# Patient Record
Sex: FEMALE | Race: ASIAN | Employment: UNEMPLOYED | URBAN - METROPOLITAN AREA
[De-identification: names, ages, dates, MRNs, and addresses within clinical notes are randomized per-mention and may not be internally consistent; named-entity substitution may affect disease eponyms.]

---

## 2019-02-20 ENCOUNTER — HOSPITAL ENCOUNTER (OUTPATIENT)
Age: 54
Discharge: HOME | End: 2019-02-22
Payer: COMMERCIAL

## 2019-02-20 ENCOUNTER — NURSE ONLY (OUTPATIENT)
Dept: FAMILY MEDICINE CLINIC | Age: 54
End: 2019-02-20
Payer: COMMERCIAL

## 2019-02-20 DIAGNOSIS — R53.83 FATIGUE, UNSPECIFIED TYPE: ICD-10-CM

## 2019-02-20 DIAGNOSIS — R53.83 FATIGUE, UNSPECIFIED TYPE: Primary | ICD-10-CM

## 2019-02-20 LAB
ALBUMIN SERPL-MCNC: 3.9 G/DL (ref 3.5–5.2)
ALP BLD-CCNC: 98 U/L (ref 35–104)
ALT SERPL-CCNC: 20 U/L (ref 0–32)
ANION GAP SERPL CALCULATED.3IONS-SCNC: 19 MMOL/L (ref 7–16)
AST SERPL-CCNC: 95 U/L (ref 0–31)
BASOPHILS ABSOLUTE: 0.04 E9/L (ref 0–0.2)
BASOPHILS RELATIVE PERCENT: 0.5 % (ref 0–2)
BILIRUB SERPL-MCNC: 0.6 MG/DL (ref 0–1.2)
BUN BLDV-MCNC: 16 MG/DL (ref 6–20)
CALCIUM SERPL-MCNC: 8 MG/DL (ref 8.6–10.2)
CHLORIDE BLD-SCNC: 100 MMOL/L (ref 98–107)
CO2: 19 MMOL/L (ref 22–29)
CREAT SERPL-MCNC: 0.6 MG/DL (ref 0.5–1)
EOSINOPHILS ABSOLUTE: 0.04 E9/L (ref 0.05–0.5)
EOSINOPHILS RELATIVE PERCENT: 0.5 % (ref 0–6)
GFR AFRICAN AMERICAN: >60
GFR NON-AFRICAN AMERICAN: >60 ML/MIN/1.73
GLUCOSE BLD-MCNC: 97 MG/DL (ref 74–99)
HCT VFR BLD CALC: 30.9 % (ref 34–48)
HEMOGLOBIN: 10 G/DL (ref 11.5–15.5)
IMMATURE GRANULOCYTES #: 0.05 E9/L
IMMATURE GRANULOCYTES %: 0.6 % (ref 0–5)
LYMPHOCYTES ABSOLUTE: 0.87 E9/L (ref 1.5–4)
LYMPHOCYTES RELATIVE PERCENT: 11.2 % (ref 20–42)
MCH RBC QN AUTO: 42.6 PG (ref 26–35)
MCHC RBC AUTO-ENTMCNC: 32.4 % (ref 32–34.5)
MCV RBC AUTO: 131.5 FL (ref 80–99.9)
MONOCYTES ABSOLUTE: 1.24 E9/L (ref 0.1–0.95)
MONOCYTES RELATIVE PERCENT: 16 % (ref 2–12)
NEUTROPHILS ABSOLUTE: 5.51 E9/L (ref 1.8–7.3)
NEUTROPHILS RELATIVE PERCENT: 71.2 % (ref 43–80)
PDW BLD-RTO: 14 FL (ref 11.5–15)
PLATELET # BLD: 161 E9/L (ref 130–450)
PMV BLD AUTO: 10 FL (ref 7–12)
POIKILOCYTES: ABNORMAL
POLYCHROMASIA: ABNORMAL
POTASSIUM SERPL-SCNC: 2.8 MMOL/L (ref 3.5–5)
RBC # BLD: 2.35 E12/L (ref 3.5–5.5)
SODIUM BLD-SCNC: 138 MMOL/L (ref 132–146)
TEAR DROP CELLS: ABNORMAL
TOTAL PROTEIN: 8 G/DL (ref 6.4–8.3)
TSH SERPL DL<=0.05 MIU/L-ACNC: 3.2 UIU/ML (ref 0.27–4.2)
WBC # BLD: 7.8 E9/L (ref 4.5–11.5)

## 2019-02-20 PROCEDURE — 36415 COLL VENOUS BLD VENIPUNCTURE: CPT | Performed by: FAMILY MEDICINE

## 2019-02-20 PROCEDURE — 80053 COMPREHEN METABOLIC PANEL: CPT

## 2019-02-20 PROCEDURE — 85025 COMPLETE CBC W/AUTO DIFF WBC: CPT

## 2019-02-20 PROCEDURE — 84443 ASSAY THYROID STIM HORMONE: CPT

## 2019-02-21 DIAGNOSIS — R53.83 FATIGUE, UNSPECIFIED TYPE: ICD-10-CM

## 2019-02-21 DIAGNOSIS — R79.89 ABNORMAL COMPLETE BLOOD COUNT: Primary | ICD-10-CM

## 2019-02-21 DIAGNOSIS — R71.8 ELEVATED MCV: ICD-10-CM

## 2019-02-25 RX ORDER — SERTRALINE HYDROCHLORIDE 100 MG/1
150 TABLET, FILM COATED ORAL DAILY
Qty: 45 TABLET | Refills: 3 | Status: SHIPPED | OUTPATIENT
Start: 2019-02-25 | End: 2019-06-24 | Stop reason: SDUPTHER

## 2019-02-28 ENCOUNTER — HOSPITAL ENCOUNTER (OUTPATIENT)
Age: 54
Discharge: HOME | End: 2019-03-02
Payer: COMMERCIAL

## 2019-02-28 DIAGNOSIS — R53.83 FATIGUE, UNSPECIFIED TYPE: ICD-10-CM

## 2019-02-28 DIAGNOSIS — R71.8 ELEVATED MCV: ICD-10-CM

## 2019-02-28 DIAGNOSIS — R79.89 ABNORMAL COMPLETE BLOOD COUNT: ICD-10-CM

## 2019-02-28 LAB
FOLATE: 5.3 NG/ML (ref 4.8–24.2)
VITAMIN B-12: 551 PG/ML (ref 211–946)

## 2019-02-28 PROCEDURE — 82607 VITAMIN B-12: CPT

## 2019-02-28 PROCEDURE — 82746 ASSAY OF FOLIC ACID SERUM: CPT

## 2019-06-24 RX ORDER — SERTRALINE HYDROCHLORIDE 100 MG/1
TABLET, FILM COATED ORAL
Qty: 45 TABLET | Refills: 2 | OUTPATIENT
Start: 2019-06-24

## 2019-06-24 RX ORDER — SERTRALINE HYDROCHLORIDE 100 MG/1
150 TABLET, FILM COATED ORAL DAILY
Qty: 45 TABLET | Refills: 3 | OUTPATIENT
Start: 2019-06-24 | End: 2019-10-23 | Stop reason: SDUPTHER

## 2019-10-17 ENCOUNTER — HOSPITAL ENCOUNTER (OUTPATIENT)
Age: 54
Discharge: HOME | End: 2019-10-19
Payer: COMMERCIAL

## 2019-10-17 DIAGNOSIS — F10.20 ETOHISM (HCC): ICD-10-CM

## 2019-10-17 DIAGNOSIS — F10.20 ETOHISM (HCC): Primary | ICD-10-CM

## 2019-10-17 PROCEDURE — 82746 ASSAY OF FOLIC ACID SERUM: CPT

## 2019-10-17 PROCEDURE — 80053 COMPREHEN METABOLIC PANEL: CPT

## 2019-10-17 PROCEDURE — 84443 ASSAY THYROID STIM HORMONE: CPT

## 2019-10-17 PROCEDURE — 82607 VITAMIN B-12: CPT

## 2019-10-18 ENCOUNTER — HOSPITAL ENCOUNTER (OUTPATIENT)
Age: 54
Discharge: HOME | End: 2019-10-20
Payer: COMMERCIAL

## 2019-10-18 LAB
ALBUMIN SERPL-MCNC: 2.9 G/DL (ref 3.5–5.2)
ALP BLD-CCNC: 123 U/L (ref 35–104)
ALT SERPL-CCNC: 67 U/L (ref 0–32)
ANION GAP SERPL CALCULATED.3IONS-SCNC: 15 MMOL/L (ref 7–16)
ANISOCYTOSIS: ABNORMAL
AST SERPL-CCNC: 172 U/L (ref 0–31)
BASOPHILS ABSOLUTE: 0.01 E9/L (ref 0–0.2)
BASOPHILS RELATIVE PERCENT: 0.1 % (ref 0–2)
BILIRUB SERPL-MCNC: 1.5 MG/DL (ref 0–1.2)
BUN BLDV-MCNC: 26 MG/DL (ref 6–20)
BURR CELLS: ABNORMAL
CALCIUM SERPL-MCNC: 8.1 MG/DL (ref 8.6–10.2)
CHLORIDE BLD-SCNC: 104 MMOL/L (ref 98–107)
CO2: 15 MMOL/L (ref 22–29)
CREAT SERPL-MCNC: 1 MG/DL (ref 0.5–1)
EOSINOPHILS ABSOLUTE: 0.13 E9/L (ref 0.05–0.5)
EOSINOPHILS RELATIVE PERCENT: 0.8 % (ref 0–6)
FOLATE: 17.8 NG/ML (ref 4.8–24.2)
GFR AFRICAN AMERICAN: >60
GFR NON-AFRICAN AMERICAN: 58 ML/MIN/1.73
GLUCOSE BLD-MCNC: 113 MG/DL (ref 74–99)
HCT VFR BLD CALC: 16.4 % (ref 34–48)
HEMOGLOBIN: 4.1 G/DL (ref 11.5–15.5)
HYPOCHROMIA: ABNORMAL
IMMATURE GRANULOCYTES #: 0.1 E9/L
IMMATURE GRANULOCYTES %: 0.6 % (ref 0–5)
LYMPHOCYTES ABSOLUTE: 1.16 E9/L (ref 1.5–4)
LYMPHOCYTES RELATIVE PERCENT: 7.3 % (ref 20–42)
MCH RBC QN AUTO: 24.3 PG (ref 26–35)
MCHC RBC AUTO-ENTMCNC: 25 % (ref 32–34.5)
MCV RBC AUTO: 97 FL (ref 80–99.9)
MONOCYTES ABSOLUTE: 0.95 E9/L (ref 0.1–0.95)
MONOCYTES RELATIVE PERCENT: 6 % (ref 2–12)
NEUTROPHILS ABSOLUTE: 13.46 E9/L (ref 1.8–7.3)
NEUTROPHILS RELATIVE PERCENT: 85.2 % (ref 43–80)
OVALOCYTES: ABNORMAL
PDW BLD-RTO: 21.1 FL (ref 11.5–15)
PLATELET # BLD: 290 E9/L (ref 130–450)
PMV BLD AUTO: 10.9 FL (ref 7–12)
POIKILOCYTES: ABNORMAL
POLYCHROMASIA: ABNORMAL
POTASSIUM SERPL-SCNC: 5 MMOL/L (ref 3.5–5)
RBC # BLD: 1.69 E12/L (ref 3.5–5.5)
SODIUM BLD-SCNC: 134 MMOL/L (ref 132–146)
TARGET CELLS: ABNORMAL
TEAR DROP CELLS: ABNORMAL
TOTAL PROTEIN: 6.9 G/DL (ref 6.4–8.3)
TSH SERPL DL<=0.05 MIU/L-ACNC: 5.76 UIU/ML (ref 0.27–4.2)
VITAMIN B-12: 1449 PG/ML (ref 211–946)
WBC # BLD: 15.8 E9/L (ref 4.5–11.5)

## 2019-10-18 PROCEDURE — 85025 COMPLETE CBC W/AUTO DIFF WBC: CPT

## 2019-10-19 ENCOUNTER — APPOINTMENT (OUTPATIENT)
Dept: CT IMAGING | Age: 54
DRG: 811 | End: 2019-10-19
Attending: FAMILY MEDICINE
Payer: COMMERCIAL

## 2019-10-19 ENCOUNTER — HOSPITAL ENCOUNTER (INPATIENT)
Age: 54
LOS: 3 days | Discharge: HOME | DRG: 811 | End: 2019-10-22
Attending: FAMILY MEDICINE | Admitting: FAMILY MEDICINE
Payer: COMMERCIAL

## 2019-10-19 PROBLEM — D64.9 ANEMIA: Status: ACTIVE | Noted: 2019-10-19

## 2019-10-19 LAB
ABO/RH: NORMAL
ANTIBODY SCREEN: NORMAL
FERRITIN: 44 NG/ML
IRON SATURATION: 8 % (ref 15–50)
IRON: 21 MCG/DL (ref 37–145)
TOTAL IRON BINDING CAPACITY: 278 MCG/DL (ref 250–450)

## 2019-10-19 PROCEDURE — 6370000000 HC RX 637 (ALT 250 FOR IP): Performed by: FAMILY MEDICINE

## 2019-10-19 PROCEDURE — 2580000003 HC RX 258: Performed by: FAMILY MEDICINE

## 2019-10-19 PROCEDURE — 86900 BLOOD TYPING SEROLOGIC ABO: CPT

## 2019-10-19 PROCEDURE — P9016 RBC LEUKOCYTES REDUCED: HCPCS

## 2019-10-19 PROCEDURE — 86923 COMPATIBILITY TEST ELECTRIC: CPT

## 2019-10-19 PROCEDURE — 83550 IRON BINDING TEST: CPT

## 2019-10-19 PROCEDURE — 82728 ASSAY OF FERRITIN: CPT

## 2019-10-19 PROCEDURE — 36415 COLL VENOUS BLD VENIPUNCTURE: CPT

## 2019-10-19 PROCEDURE — 36430 TRANSFUSION BLD/BLD COMPNT: CPT

## 2019-10-19 PROCEDURE — 74177 CT ABD & PELVIS W/CONTRAST: CPT

## 2019-10-19 PROCEDURE — 6360000004 HC RX CONTRAST MEDICATION: Performed by: RADIOLOGY

## 2019-10-19 PROCEDURE — 83540 ASSAY OF IRON: CPT

## 2019-10-19 PROCEDURE — 86901 BLOOD TYPING SEROLOGIC RH(D): CPT

## 2019-10-19 PROCEDURE — 86850 RBC ANTIBODY SCREEN: CPT

## 2019-10-19 PROCEDURE — 1200000000 HC SEMI PRIVATE

## 2019-10-19 RX ORDER — NAPROXEN 250 MG/1
800 TABLET ORAL 2 TIMES DAILY PRN
Status: ON HOLD | COMMUNITY
End: 2019-10-22 | Stop reason: HOSPADM

## 2019-10-19 RX ORDER — SODIUM CHLORIDE 0.9 % (FLUSH) 0.9 %
10 SYRINGE (ML) INJECTION EVERY 12 HOURS SCHEDULED
Status: DISCONTINUED | OUTPATIENT
Start: 2019-10-19 | End: 2019-10-22 | Stop reason: HOSPADM

## 2019-10-19 RX ORDER — 0.9 % SODIUM CHLORIDE 0.9 %
250 INTRAVENOUS SOLUTION INTRAVENOUS ONCE
Status: COMPLETED | OUTPATIENT
Start: 2019-10-19 | End: 2019-10-19

## 2019-10-19 RX ORDER — ONDANSETRON 2 MG/ML
4 INJECTION INTRAMUSCULAR; INTRAVENOUS EVERY 6 HOURS PRN
Status: DISCONTINUED | OUTPATIENT
Start: 2019-10-19 | End: 2019-10-22 | Stop reason: HOSPADM

## 2019-10-19 RX ORDER — SODIUM CHLORIDE 0.9 % (FLUSH) 0.9 %
10 SYRINGE (ML) INJECTION PRN
Status: DISCONTINUED | OUTPATIENT
Start: 2019-10-19 | End: 2019-10-22 | Stop reason: HOSPADM

## 2019-10-19 RX ADMIN — SODIUM CHLORIDE 250 ML: 9 INJECTION, SOLUTION INTRAVENOUS at 15:47

## 2019-10-19 RX ADMIN — IOHEXOL 50 ML: 240 INJECTION, SOLUTION INTRATHECAL; INTRAVASCULAR; INTRAVENOUS; ORAL at 20:34

## 2019-10-19 RX ADMIN — SERTRALINE 150 MG: 100 TABLET, FILM COATED ORAL at 12:24

## 2019-10-19 RX ADMIN — Medication 10 ML: at 10:10

## 2019-10-19 RX ADMIN — Medication 10 ML: at 21:50

## 2019-10-19 RX ADMIN — IOPAMIDOL 110 ML: 755 INJECTION, SOLUTION INTRAVENOUS at 20:36

## 2019-10-19 ASSESSMENT — PAIN SCALES - GENERAL
PAINLEVEL_OUTOF10: 0
PAINLEVEL_OUTOF10: 0

## 2019-10-19 NOTE — CONSULTS
[address] [address]                                  CONSULTATION    PATIENT NAME: Roland Anders                     :        1965  MED REC NO:   [id number]                            ROOM:       [id number]  ACCOUNT NO:   [de-identified]                           ADMIT DATE: 10/19/2019  PROVIDER:     Nas Clements MD    CONSULT DATE:  10/19/2019    REASON FOR CONSULTATION:  Severe anemia, alcohol abuse. HISTORY OF PRESENT ILLNESS:  She is a 25-year-old woman. She was  admitted to the hospital with severe anemia. Her hemoglobin returned to  4.1 with a hematocrit of 16 and an MCV of 97. She has had a progressive  onset of fatigue. She acknowledges some bright red rectal bleeding, but  no melena. Stools are soft. She has no complaints of abdominal pain. In 2019, she had a hemoglobin of 10.0, hematocrit of 31, and an MCV  of 131.5, but surprisingly with it, she had a normal folate and B12  level. In December the year before, her hemoglobin was 8.7, her MCV was  121. Her platelet counts have been consistently normal.    In 2017, she had an AST of 128, an albumin of 3.6, and a bilirubin of  0.4. On 10/17, she had an albumin of 2.9, an alkaline phosphatase of  123, an ALT of 67, an AST of 172, and a bilirubin of 1.5. She has had  alcohol levels in the past, which were three to four times the upper  limit of legal intoxication, the last recorded in 2017. It is clear that she has a longstanding history of alcohol use and  abuse. When asked if she drinks, she said no. She has apparently been  abstinent for two, maybe three weeks. There is no history of alcohol  withdrawal, ascites, or jaundice. PAST MEDICAL HISTORY:  Very limited. She has a history of depression  and reports that she has been on Sertraline for years.   She has no  history of ulcer disease, diabetes, hypertension, heart disease, asthma,  kidney stones, gallstones. She has never been hospitalized. PAST SURGICAL HISTORY:  She has had no surgical procedures. SOCIAL HISTORY:  She met her  in [employer] and they worked  in [employer] for a long period of time moving back to [employer]  about a year and half ago. She is of  origins. She is currently  not working. She quit smoking cigarettes 30 years ago. OBJECTIVE:  GENERAL:  She is quite talkative. She is pale. She is not jaundiced. VITAL SIGNS:  She has a temperature of 97.9. A pulse, which is running  about 100 to 108. Respirations, which are easy and unlabored. Room air  saturations 99% to 100%. NECK:  She has no thyromegaly. She has no spider nevi. She has no neck  vein elevation or asterixis. LUNGS:  Lungs are clear. HEART:  Heart tones are normal.  ABDOMEN:  She has a rounded abdomen, but does not behave like she has  ascites. No hernia. EXTREMITIES:  Tattoo on the left ankle. No edema. ASSESSMENT:  Profoundly macrocytic previously. The normochromic  normocytic anemia now suggests a mixed pattern with development of iron  deficiency. Iron studies are pending. She is to be transfused. We  will get a CAT scan. I discussed endoscopic evaluation and would  anticipate proceeding tomorrow or on Monday if she is agreeable. I  think she ultimately will be, but expresses some reservations about  doing so. She should be beyond any risk of alcohol withdrawal at this  time.         Aidan Tim MD    D: 10/19/2019 13:46:49       T: 10/19/2019 13:58:22     [email or web address]  Job#: [id number]     Doc#: [id number]    CC:  Berny Perez MD

## 2019-10-19 NOTE — H&P
dysphagia,++ nausea,no vomiting ++ diarrhea. No abdominal pain no constipation. No melena.   No dysuria, no frequency, no urgency no hematuria. ENDO   No heat or cold intolerance. No diabetes, no thyroid problems. HEME  No bruising no bleeding problems. [address] SKEL. No joint pain, no joint swelling,++ weakness, no tenderness, no cramps, no  back pain, no neck pain   PSHYCH. No depression ++ anxiety. Not suicidal  NEURO. No headaches,++weakness no dysarthria. No paralysis,no paresthesias,  No vertigo, no seizures, no tumors. No memory loss. Decreased concentration. No Anhedonia, ++ sleep disorders. DATA:      Recent Labs     10/18/19  1533   WBC 15.8*   HGB 4.1*   HCT 16.4*   MCV 97.0        Recent Labs     10/17/19  1400      K 5.0      CO2 15*   BUN 26*   CREATININE 1.0   LABGLOM 58   CALCIUM 8.1*     Recent Labs     10/17/19  1400   ALT 67*     INR: No results for input(s): INR in the last 72 hours. Invalid input(s): PT/INR    No results found for: CRP  No results found for: SEDRATE  No results for input(s): POCGLU in the last 72 hours. Lipids: No results for input(s): CHOL, HDL in the last 72 hours. Invalid input(s): LDLCALCU  ABGs: No results found for: PHART, PO2ART, CFZ2UYX    Last 3 Troponin:    Lab Results   Component Value Date    TROPONINI <0.01 12/23/2017     TSH:    Lab Results   Component Value Date    TSH 5.760 10/17/2019        No results for input(s): POCGLU in the last 72 hours. U/A:   No results for input(s): NITRITE, COLORU, PHUR, LABCAST, WBCUA, RBCUA, MUCUS, TRICHOMONAS, YEAST, BACTERIA, CLARITYU, SPECGRAV, LEUKOCYTESUR, UROBILINOGEN, BILIRUBINUR, BLOODU, GLUCOSEU, AMORPHOUS in the last 72 hours.     Invalid input(s): [place] studies:No results found for: FERRITIN  Bone disease:No results found for: PTH, MG, PHOS  Nutrition:No results found for: ALB    Wt

## 2019-10-19 NOTE — PATIENT CARE CONFERENCE
[facility] Quality Flow/Interdisciplinary Rounds Progress Note        Quality Flow Rounds held on October 19, 2019    Disciplines Attending:  Bedside Nurse, ,  and Nursing Unit Leadership    Noe De Los Santos was admitted on 10/19/2019  9:13 AM    Anticipated Discharge Date:  Expected Discharge Date: 10/22/19    Disposition:    Jaleel Score:  Jaleel Scale Score: 19    Readmission Risk              Risk of Unplanned Readmission:        5           Discussed patient goal for the day, patient clinical progression, and barriers to discharge.   The following Goal(s) of the Day/Commitment(s) have been identified:  Blood transfusion, monitor labs      Angie Meyers  October 19, 2019

## 2019-10-20 ENCOUNTER — ANESTHESIA EVENT (OUTPATIENT)
Dept: ENDOSCOPY | Age: 54
DRG: 811 | End: 2019-10-20
Payer: COMMERCIAL

## 2019-10-20 ENCOUNTER — ANESTHESIA (OUTPATIENT)
Dept: ENDOSCOPY | Age: 54
DRG: 811 | End: 2019-10-20
Payer: COMMERCIAL

## 2019-10-20 LAB
BASOPHILS ABSOLUTE: 0.04 E9/L (ref 0–0.2)
BASOPHILS RELATIVE PERCENT: 0.3 % (ref 0–2)
BLOOD BANK DISPENSE STATUS: NORMAL
BLOOD BANK PRODUCT CODE: NORMAL
BPU ID: NORMAL
DESCRIPTION BLOOD BANK: NORMAL
EOSINOPHILS ABSOLUTE: 0.27 E9/L (ref 0.05–0.5)
EOSINOPHILS RELATIVE PERCENT: 1.8 % (ref 0–6)
HCT VFR BLD CALC: 22.7 % (ref 34–48)
HEMOGLOBIN: 6.8 G/DL (ref 11.5–15.5)
IMMATURE GRANULOCYTES #: 0.1 E9/L
IMMATURE GRANULOCYTES %: 0.7 % (ref 0–5)
LYMPHOCYTES ABSOLUTE: 1.57 E9/L (ref 1.5–4)
LYMPHOCYTES RELATIVE PERCENT: 10.5 % (ref 20–42)
MCH RBC QN AUTO: 27.2 PG (ref 26–35)
MCHC RBC AUTO-ENTMCNC: 30 % (ref 32–34.5)
MCV RBC AUTO: 90.8 FL (ref 80–99.9)
MONOCYTES ABSOLUTE: 1.07 E9/L (ref 0.1–0.95)
MONOCYTES RELATIVE PERCENT: 7.2 % (ref 2–12)
NEUTROPHILS ABSOLUTE: 11.9 E9/L (ref 1.8–7.3)
NEUTROPHILS RELATIVE PERCENT: 79.5 % (ref 43–80)
PDW BLD-RTO: 17.6 FL (ref 11.5–15)
PLATELET # BLD: 223 E9/L (ref 130–450)
PMV BLD AUTO: 10.2 FL (ref 7–12)
RBC # BLD: 2.5 E12/L (ref 3.5–5.5)
WBC # BLD: 15 E9/L (ref 4.5–11.5)

## 2019-10-20 PROCEDURE — 85025 COMPLETE CBC W/AUTO DIFF WBC: CPT

## 2019-10-20 PROCEDURE — 36415 COLL VENOUS BLD VENIPUNCTURE: CPT

## 2019-10-20 PROCEDURE — 2580000003 HC RX 258: Performed by: FAMILY MEDICINE

## 2019-10-20 PROCEDURE — 86803 HEPATITIS C AB TEST: CPT

## 2019-10-20 PROCEDURE — 1200000000 HC SEMI PRIVATE

## 2019-10-20 PROCEDURE — 6370000000 HC RX 637 (ALT 250 FOR IP): Performed by: FAMILY MEDICINE

## 2019-10-20 PROCEDURE — 86704 HEP B CORE ANTIBODY TOTAL: CPT

## 2019-10-20 PROCEDURE — 36430 TRANSFUSION BLD/BLD COMPNT: CPT

## 2019-10-20 RX ORDER — 0.9 % SODIUM CHLORIDE 0.9 %
250 INTRAVENOUS SOLUTION INTRAVENOUS ONCE
Status: COMPLETED | OUTPATIENT
Start: 2019-10-20 | End: 2019-10-20

## 2019-10-20 RX ADMIN — SERTRALINE 150 MG: 100 TABLET, FILM COATED ORAL at 08:27

## 2019-10-20 RX ADMIN — Medication 10 ML: at 21:00

## 2019-10-20 RX ADMIN — SODIUM CHLORIDE 250 ML: 9 INJECTION, SOLUTION INTRAVENOUS at 12:48

## 2019-10-20 RX ADMIN — Medication 10 ML: at 08:27

## 2019-10-20 ASSESSMENT — PAIN SCALES - GENERAL
PAINLEVEL_OUTOF10: 0
PAINLEVEL_OUTOF10: 0

## 2019-10-20 ASSESSMENT — ENCOUNTER SYMPTOMS: SHORTNESS OF BREATH: 0

## 2019-10-20 NOTE — PROGRESS NOTES
Tx ii units, Hgb to 6.8  Iron studies c/w iron deficency in this situation  CT: ascites, gallstones and calcificed fibroid. I believe liver is cirrhotic but radiology did not call  it or raise the possibility    Will ask IR if enough fluid for diagnostic tap which there should be easily done  Check INR  EGD tomorrow.

## 2019-10-20 NOTE — PATIENT CARE CONFERENCE
[facility] Quality Flow/Interdisciplinary Rounds Progress Note        Quality Flow Rounds held on October 20, 2019    Disciplines Attending:  Bedside Nurse, ,  and Nursing Unit Leadership    Nitesh Delaney was admitted on 10/19/2019  9:13 AM    Anticipated Discharge Date:  Expected Discharge Date: 10/22/19    Disposition:    Jaleel Score:  Jaleel Scale Score: 20    Readmission Risk              Risk of Unplanned Readmission:        6           Discussed patient goal for the day, patient clinical progression, and barriers to discharge.   The following Goal(s) of the Day/Commitment(s) have been identified:  Monitor labs, safety control, discharge planning      Jin Nguyen  October 20, 2019

## 2019-10-21 VITALS
OXYGEN SATURATION: 100 % | DIASTOLIC BLOOD PRESSURE: 77 MMHG | SYSTOLIC BLOOD PRESSURE: 130 MMHG | RESPIRATION RATE: 16 BRPM

## 2019-10-21 LAB
BASOPHILS ABSOLUTE: 0.07 E9/L (ref 0–0.2)
BASOPHILS RELATIVE PERCENT: 0.5 % (ref 0–2)
EOSINOPHILS ABSOLUTE: 0.41 E9/L (ref 0.05–0.5)
EOSINOPHILS RELATIVE PERCENT: 3 % (ref 0–6)
HCT VFR BLD CALC: 30.1 % (ref 34–48)
HEMOGLOBIN: 9.5 G/DL (ref 11.5–15.5)
HEPATITIS C ANTIBODY INTERPRETATION: NORMAL
IMMATURE GRANULOCYTES #: 0.11 E9/L
IMMATURE GRANULOCYTES %: 0.8 % (ref 0–5)
INR BLD: 1.3
LV EF: 60 %
LVEF MODALITY: NORMAL
LYMPHOCYTES ABSOLUTE: 1.34 E9/L (ref 1.5–4)
LYMPHOCYTES RELATIVE PERCENT: 9.9 % (ref 20–42)
MCH RBC QN AUTO: 27.2 PG (ref 26–35)
MCHC RBC AUTO-ENTMCNC: 31.6 % (ref 32–34.5)
MCV RBC AUTO: 86.2 FL (ref 80–99.9)
MONOCYTES ABSOLUTE: 0.78 E9/L (ref 0.1–0.95)
MONOCYTES RELATIVE PERCENT: 5.8 % (ref 2–12)
NEUTROPHILS ABSOLUTE: 10.8 E9/L (ref 1.8–7.3)
NEUTROPHILS RELATIVE PERCENT: 80 % (ref 43–80)
PDW BLD-RTO: 17.9 FL (ref 11.5–15)
PLATELET # BLD: 190 E9/L (ref 130–450)
PMV BLD AUTO: 10.1 FL (ref 7–12)
PROTHROMBIN TIME: 15.7 SEC (ref 9.3–12.4)
RBC # BLD: 3.49 E12/L (ref 3.5–5.5)
WBC # BLD: 13.5 E9/L (ref 4.5–11.5)

## 2019-10-21 PROCEDURE — 93306 TTE W/DOPPLER COMPLETE: CPT

## 2019-10-21 PROCEDURE — 3609012400 HC EGD TRANSORAL BIOPSY SINGLE/MULTIPLE: Performed by: INTERNAL MEDICINE

## 2019-10-21 PROCEDURE — 2580000003 HC RX 258: Performed by: NURSE ANESTHETIST, CERTIFIED REGISTERED

## 2019-10-21 PROCEDURE — 0DB68ZX EXCISION OF STOMACH, VIA NATURAL OR ARTIFICIAL OPENING ENDOSCOPIC, DIAGNOSTIC: ICD-10-PCS | Performed by: INTERNAL MEDICINE

## 2019-10-21 PROCEDURE — 1200000000 HC SEMI PRIVATE

## 2019-10-21 PROCEDURE — 3700000001 HC ADD 15 MINUTES (ANESTHESIA): Performed by: INTERNAL MEDICINE

## 2019-10-21 PROCEDURE — 36415 COLL VENOUS BLD VENIPUNCTURE: CPT

## 2019-10-21 PROCEDURE — 88342 IMHCHEM/IMCYTCHM 1ST ANTB: CPT

## 2019-10-21 PROCEDURE — 6360000002 HC RX W HCPCS: Performed by: NURSE ANESTHETIST, CERTIFIED REGISTERED

## 2019-10-21 PROCEDURE — 85610 PROTHROMBIN TIME: CPT

## 2019-10-21 PROCEDURE — 7100000010 HC PHASE II RECOVERY - FIRST 15 MIN: Performed by: INTERNAL MEDICINE

## 2019-10-21 PROCEDURE — 3700000000 HC ANESTHESIA ATTENDED CARE: Performed by: INTERNAL MEDICINE

## 2019-10-21 PROCEDURE — 88341 IMHCHEM/IMCYTCHM EA ADD ANTB: CPT

## 2019-10-21 PROCEDURE — 2709999900 HC NON-CHARGEABLE SUPPLY: Performed by: INTERNAL MEDICINE

## 2019-10-21 PROCEDURE — 7100000011 HC PHASE II RECOVERY - ADDTL 15 MIN: Performed by: INTERNAL MEDICINE

## 2019-10-21 PROCEDURE — 85025 COMPLETE CBC W/AUTO DIFF WBC: CPT

## 2019-10-21 PROCEDURE — 2580000003 HC RX 258: Performed by: FAMILY MEDICINE

## 2019-10-21 PROCEDURE — 6370000000 HC RX 637 (ALT 250 FOR IP): Performed by: FAMILY MEDICINE

## 2019-10-21 PROCEDURE — 88305 TISSUE EXAM BY PATHOLOGIST: CPT

## 2019-10-21 RX ORDER — PROPOFOL 10 MG/ML
INJECTION, EMULSION INTRAVENOUS PRN
Status: DISCONTINUED | OUTPATIENT
Start: 2019-10-21 | End: 2019-10-21 | Stop reason: SDUPTHER

## 2019-10-21 RX ORDER — SODIUM CHLORIDE 9 MG/ML
INJECTION, SOLUTION INTRAVENOUS CONTINUOUS PRN
Status: DISCONTINUED | OUTPATIENT
Start: 2019-10-21 | End: 2019-10-21 | Stop reason: SDUPTHER

## 2019-10-21 RX ADMIN — PROPOFOL 200 MG: 10 INJECTION, EMULSION INTRAVENOUS at 14:05

## 2019-10-21 RX ADMIN — Medication 10 ML: at 10:09

## 2019-10-21 RX ADMIN — Medication 10 ML: at 22:00

## 2019-10-21 RX ADMIN — SERTRALINE 150 MG: 100 TABLET, FILM COATED ORAL at 17:18

## 2019-10-21 RX ADMIN — SODIUM CHLORIDE: 9 INJECTION, SOLUTION INTRAVENOUS at 13:44

## 2019-10-21 ASSESSMENT — PAIN - FUNCTIONAL ASSESSMENT: PAIN_FUNCTIONAL_ASSESSMENT: ACTIVITIES ARE NOT PREVENTED

## 2019-10-21 ASSESSMENT — PAIN SCALES - GENERAL
PAINLEVEL_OUTOF10: 0

## 2019-10-21 ASSESSMENT — PAIN DESCRIPTION - FREQUENCY: FREQUENCY: INTERMITTENT

## 2019-10-21 ASSESSMENT — PAIN DESCRIPTION - ONSET: ONSET: ON-GOING

## 2019-10-21 ASSESSMENT — PAIN DESCRIPTION - PROGRESSION: CLINICAL_PROGRESSION: RAPIDLY IMPROVING

## 2019-10-21 NOTE — BRIEF OP NOTE
Brief Postoperative Note  ______________________________________________________________    Patient: Delford Fleischer  YOB: 1965  MRN: [id number]  Date of Procedure: 10/21/2019    Pre-Op Diagnosis: anemia, cirrhosis    Post-Op Diagnosis: benign antral ulcer + duodenal ulcer       Procedure(s):  EGD ESOPHAGOGASTRODUODENOSCOPY plus bx    Anesthesia: Monitor Anesthesia Care    Surgeon(s):  Pennie Hong MD    Assistant:     Estimated Blood Loss (mL): 0    Complications: None    Specimens:   ID Type Source Tests Collected by Time Destination   A : gastric ulcer bx Tissue Stomach SURGICAL PATHOLOGY Pennie Hong MD 10/21/2019 1408        Implants:  * No implants in log *      Drains: * No LDAs found *    Findings: see Jason Godwin MD  Date: 10/21/2019  Time: 2:12 PM

## 2019-10-21 NOTE — PROGRESS NOTES
Admit Date: 10/19/2019       Subjective:    hgb 6.8 after 2 u prbc's  Ct showed ascites but does not note any liver abnormalities  She looks better, still decreased appetite and food not tasting good. Objective:    Scheduled Meds:  Current Facility-Administered Medications   Medication Dose Route Frequency Provider Last Rate Last Dose    sertraline (ZOLOFT) tablet 150 mg  150 mg Oral Daily Parag Lomeli MD   150 mg at 10/20/19 0827    sodium chloride flush 0.9 % injection 10 mL  10 mL Intravenous 2 times per day Parag Lomeli MD   10 mL at 10/20/19 2100    sodium chloride flush 0.9 % injection 10 mL  10 mL Intravenous PRN Parag Lomeli MD   10 mL at 10/19/19 1010    magnesium hydroxide (MILK OF MAGNESIA) 400 MG/5ML suspension 30 mL  30 mL Oral Daily PRN Parag Lomeli MD        ondansetron [facility]) injection 4 mg  4 mg Intravenous Q6H PRN Parag Lomeli MD        sodium chloride flush 0.9 % injection 10 mL  10 mL Intravenous PRN Maria Luz Jenkins II, MD           Continuous Infusions  :    PRN Meds:  sodium chloride flush, magnesium hydroxide, ondansetron, sodium chloride flush      Wt Readings from Last 3 Encounters:   10/20/19 100 lb (45.4 kg)   12/23/17 100 lb (45.4 kg)     I/O last 3 completed shifts: In: 700 [Blood:700]  Out: -     Intake/Output Summary (Last 24 hours) at 10/21/2019 0735  Last data filed at 10/20/2019 2100  Gross per 24 hour   Intake 700 ml   Output --   Net 700 ml       Vitals:    10/20/19 2100   BP: 129/67   Pulse: 75   Resp: 16   Temp: 98.9 °F (37.2 °C)   SpO2: 99%       Physical Exam:    Skin:    Warm and dry.   No rash or bruises  HEENT:   PERRLA, EOMI  Neck:    No JVD, No thyromegaly, No carotid bruit  Cardiac:   RRR,++ murmur  Lungs:   CTA,  No wheezes or rales,Normal percussion  Abdomen:  Normal bowel sounds, abd soft, non-tender, no rebound or guarding  Extremities:   No clubbing, edema or cyanosis  Neurological:   A & O x 3, Moves all extremities,  sions noted\"}                           CBC  Recent Labs     10/18/19  1533 10/20/19  0240 10/21/19  0400   WBC 15.8* 15.0* 13.5*   HGB 4.1* 6.8* 9.5*   HCT 16.4* 22.7* 30.1*   MCV 97.0 90.8 86.2    223 190     BMP  No results for input(s): NA, K, CL, CO2, BUN, CREATININE, LABGLOM, CALCIUM in the last 72 hours. Invalid input(s): GLU  LFT's  No results for input(s): ALT in the last 72 hours. Invalid input(s):  AST,  ALKPHOS,  BILITOT,  BILIDIR  INR:   Recent Labs     10/21/19  0400   INR 1.3         No results found for: CRP  No results found for: SEDRATE  No results for input(s): POCGLU in the last 72 hours. Lipids: No results for input(s): CHOL, HDL in the last 72 hours. Invalid input(s): LDLCALCU  ABGs: No results found for: PHART, PO2ART, QOV6GNN  SpO2 Readings from Last 1 Encounters:   10/20/19 99%       Last 3 Troponin:    Lab Results   Component Value Date    TROPONINI <0.01 12/23/2017     Lab Results   Component Value Date    TROPONINI <0.01 12/23/2017        TSH:    Lab Results   Component Value Date    TSH 5.760 10/17/2019             U/A:     Lab Results   Component Value Date    COLORU Yellow 12/23/2017    PHUR 7.0 12/23/2017    WBCUA NONE 12/23/2017    RBCUA NONE 12/23/2017    BACTERIA MANY 12/23/2017    CLARITYU Clear 12/23/2017    SPECGRAV 1.015 12/23/2017    LEUKOCYTESUR Negative 12/23/2017    UROBILINOGEN 0.2 12/23/2017    BILIRUBINUR Negative 12/23/2017    BLOODU Negative 12/23/2017    GLUCOSEU Negative 12/23/2017          Iron studies:  Lab Results   Component Value Date    FERRITIN 44 10/19/2019     Bone disease:No results found for: PTH, MG, PHOS  Nutrition:No results found for: ALB           Assessment:  Patient Active Problem List   Diagnosis Code    Anemia D64.9     1. Anemia              Combo blood loss, malnutritin and Fe deficiency     2. Severe malnutrition     3.  etoh abuse     4.  etoh liver disease     5. Ascites    6. Heart murmur new           Plan:     For scope tomorrow  Bety Phipps M.D.    10/21/2019

## 2019-10-21 NOTE — PATIENT CARE CONFERENCE
[facility] Quality Flow/Interdisciplinary Rounds Progress Note        Quality Flow Rounds held on October 21, 2019    Disciplines Attending:  Bedside Nurse, ,  and Nursing Unit Leadership    Elena Diego was admitted on 10/19/2019  9:13 AM    Anticipated Discharge Date:  Expected Discharge Date: 10/22/19    Disposition:    Jaleel Score:  Jaleel Scale Score: 21    Readmission Risk              Risk of Unplanned Readmission:        5           Discussed patient goal for the day, patient clinical progression, and barriers to discharge. The following Goal(s) of the Day/Commitment(s) have been identified:  Patient due to Endo today. Comfort.       Tracy Wilson  October 21, 2019

## 2019-10-22 ENCOUNTER — APPOINTMENT (OUTPATIENT)
Dept: ULTRASOUND IMAGING | Age: 54
DRG: 811 | End: 2019-10-22
Attending: FAMILY MEDICINE
Payer: COMMERCIAL

## 2019-10-22 VITALS
RESPIRATION RATE: 18 BRPM | HEART RATE: 89 BPM | WEIGHT: 100 LBS | HEIGHT: 60 IN | SYSTOLIC BLOOD PRESSURE: 117 MMHG | DIASTOLIC BLOOD PRESSURE: 68 MMHG | TEMPERATURE: 97.8 F | OXYGEN SATURATION: 100 % | BODY MASS INDEX: 19.63 KG/M2

## 2019-10-22 PROBLEM — D50.0 ANEMIA DUE TO GI BLOOD LOSS: Status: ACTIVE | Noted: 2019-10-22

## 2019-10-22 PROBLEM — D50.0 IRON DEFICIENCY ANEMIA DUE TO CHRONIC BLOOD LOSS: Status: ACTIVE | Noted: 2019-10-22

## 2019-10-22 LAB
APTT: 40.6 SEC (ref 24.5–35.1)
HEPATITIS B CORE TOTAL ANTIBODY: NONREACTIVE
INR BLD: 1.2
PROTHROMBIN TIME: 14.3 SEC (ref 9.3–12.4)

## 2019-10-22 PROCEDURE — 6370000000 HC RX 637 (ALT 250 FOR IP): Performed by: FAMILY MEDICINE

## 2019-10-22 PROCEDURE — 76700 US EXAM ABDOM COMPLETE: CPT

## 2019-10-22 PROCEDURE — 6370000000 HC RX 637 (ALT 250 FOR IP): Performed by: INTERNAL MEDICINE

## 2019-10-22 PROCEDURE — 85610 PROTHROMBIN TIME: CPT

## 2019-10-22 PROCEDURE — 36415 COLL VENOUS BLD VENIPUNCTURE: CPT

## 2019-10-22 PROCEDURE — 85730 THROMBOPLASTIN TIME PARTIAL: CPT

## 2019-10-22 PROCEDURE — 2580000003 HC RX 258: Performed by: FAMILY MEDICINE

## 2019-10-22 RX ORDER — FERROUS SULFATE 325(65) MG
325 TABLET ORAL
Qty: 30 TABLET | Refills: 3 | COMMUNITY
Start: 2019-10-22 | End: 2021-03-08 | Stop reason: SDUPTHER

## 2019-10-22 RX ORDER — PANTOPRAZOLE SODIUM 40 MG/1
40 TABLET, DELAYED RELEASE ORAL
Status: DISCONTINUED | OUTPATIENT
Start: 2019-10-22 | End: 2019-10-22 | Stop reason: HOSPADM

## 2019-10-22 RX ORDER — FUROSEMIDE 20 MG/1
20 TABLET ORAL DAILY
Qty: 30 TABLET | Refills: 5 | Status: SHIPPED | OUTPATIENT
Start: 2019-10-22 | End: 2020-07-11 | Stop reason: SDUPTHER

## 2019-10-22 RX ORDER — OMEPRAZOLE 40 MG/1
40 CAPSULE, DELAYED RELEASE ORAL
Qty: 30 CAPSULE | Refills: 5 | Status: SHIPPED | OUTPATIENT
Start: 2019-10-22 | End: 2020-07-11 | Stop reason: SDUPTHER

## 2019-10-22 RX ORDER — SPIRONOLACTONE 25 MG/1
25 TABLET ORAL DAILY
Qty: 30 TABLET | Refills: 5 | Status: SHIPPED | OUTPATIENT
Start: 2019-10-22 | End: 2021-01-07 | Stop reason: SDUPTHER

## 2019-10-22 RX ADMIN — Medication 10 ML: at 07:50

## 2019-10-22 RX ADMIN — PANTOPRAZOLE SODIUM 40 MG: 40 TABLET, DELAYED RELEASE ORAL at 11:55

## 2019-10-22 RX ADMIN — SERTRALINE 150 MG: 100 TABLET, FILM COATED ORAL at 07:50

## 2019-10-22 ASSESSMENT — PAIN SCALES - GENERAL: PAINLEVEL_OUTOF10: 0

## 2019-10-22 NOTE — PLAN OF CARE
Problem: Pain:  Goal: Pain level will decrease  Description  Pain level will decrease  Outcome: Met This Shift  Goal: Control of acute pain  Description  Control of acute pain  Outcome: Met This Shift  Goal: Control of chronic pain  Description  Control of chronic pain  Outcome: Met This Shift     Problem: Falls - Risk of:  Goal: Will remain free from falls  Description  Will remain free from falls  Outcome: Met This Shift  Goal: Absence of physical injury  Description  Absence of physical injury  Outcome: Met This Shift     Problem: Activity:  Goal: Fatigue will decrease  Description  Fatigue will decrease  Outcome: Met This Shift  Goal: Ability to tolerate increased activity will improve  Description  Ability to tolerate increased activity will improve  Outcome: Met This Shift  Goal: Ability to maintain optimal joint mobility will improve  Description  Ability to maintain optimal joint mobility will improve  Outcome: Met This Shift     Problem:  Bowel/Gastric:  Goal: Ability to achieve a regular elimination pattern will improve  Description  Ability to achieve a regular elimination pattern will improve  Outcome: Met This Shift     Problem: Cardiac:  Goal: Ability to maintain an adequate cardiac output will improve  Description  Ability to maintain an adequate cardiac output will improve  Outcome: Met This Shift  Goal: Ability to maintain adequate ventilation will improve  Description  Ability to maintain adequate ventilation will improve  Outcome: Met This Shift  Goal: Ability to achieve and maintain adequate cardiopulmonary perfusion will improve  Description  Ability to achieve and maintain adequate cardiopulmonary perfusion will improve  Outcome: Met This Shift     Problem: Health Behavior:  Goal: Identification of resources available to assist in meeting health care needs will improve  Description  Identification of resources available to assist in meeting health care needs will improve  Outcome: Met This Shift     Problem: Nutritional:  Goal: Maintenance of adequate nutrition will improve  Description  Maintenance of adequate nutrition will improve  Outcome: Met This Shift     Problem: Safety:  Goal: Ability to remain free from injury will improve  Description  Ability to remain free from injury will improve  Outcome: Met This Shift     Problem: Tissue Perfusion:  Goal: Ability to maintain adequate tissue perfusion will improve  Description  Ability to maintain adequate tissue perfusion will improve  Outcome: Met This Shift  Goal: Ability to maintain a stable neurologic state will improve  Description  Ability to maintain a stable neurologic state will improve  Outcome: Met This Shift

## 2019-10-22 NOTE — PROGRESS NOTES
Admit Date: 10/19/2019       Subjective:    hgb 9.5 after 4 u prbc's  Ct showed ascites but does not note any liver abnormalities    For  IR paracentesis  echo heart normal    Place on lasix, aldactone and omeprazole on discharge as well as supplemental iron       Objective:    Scheduled Meds:  Current Facility-Administered Medications   Medication Dose Route Frequency Provider Last Rate Last Dose    perflutren lipid microspheres (DEFINITY) injection 1.65 mg  1.5 mL Intravenous ONCE PRN Renita Meléndez MD        sertraline (ZOLOFT) tablet 150 mg  150 mg Oral Daily Renita Meléndez MD   150 mg at 10/21/19 1718    sodium chloride flush 0.9 % injection 10 mL  10 mL Intravenous 2 times per day Renita Meléndez MD   10 mL at 10/21/19 2200    sodium chloride flush 0.9 % injection 10 mL  10 mL Intravenous PRN Renita Meléndez MD   10 mL at 10/19/19 1010    magnesium hydroxide (MILK OF MAGNESIA) 400 MG/5ML suspension 30 mL  30 mL Oral Daily PRN Renita Meléndez MD        ondansetron [facility]) injection 4 mg  4 mg Intravenous Q6H PRN Renita Meléndez MD        sodium chloride flush 0.9 % injection 10 mL  10 mL Intravenous PRN Tr Newberry II, MD           Continuous Infusions  :    PRN Meds:  perflutren lipid microspheres, sodium chloride flush, magnesium hydroxide, ondansetron, sodium chloride flush      Wt Readings from Last 3 Encounters:   10/20/19 100 lb (45.4 kg)   12/23/17 100 lb (45.4 kg)     I/O last 3 completed shifts: In: 0 [P.O.:400; I.V.:300]  Out: -     Intake/Output Summary (Last 24 hours) at 10/22/2019 0740  Last data filed at 10/21/2019 2200  Gross per 24 hour   Intake 700 ml   Output --   Net 700 ml       Vitals:    10/21/19 2100   BP: 122/74   Pulse: 98   Resp: 16   Temp: 97.8 °F (36.6 °C)   SpO2: 97%       Physical Exam:    Skin:    Warm and dry.   No rash or bruises  HEENT:   PERRLA, EOMI  Neck:    No JVD, No thyromegaly, No carotid bruit  Cardiac:   RRR,++ murmur  Lungs:   CTA,  No malnutritin and Fe deficiency   Acute blood loss anemia  Chronic blood loss anemia         Risk Factors: alcohol abuse, cirrhosis       Treatment:  Transfused with 4 units PRBC's, GI consult, EGD  2. Severe malnutrition     3.  etoh abuse     4.  etoh liver disease     5. Ascites    6.   Heart murmur new    7.  cirrhosis       Plan:    For OR paracentesis    Earnest Najera M.D.    10/22/2019

## 2019-10-22 NOTE — CARE COORDINATION
Met with patient.  Discussed diagnosis and discharge plan of care   Pt lives with spouse in [place] and plans to return  Does not feel she needs anything upon discharge   Will follow   elda

## 2019-10-23 RX ORDER — SERTRALINE HYDROCHLORIDE 100 MG/1
150 TABLET, FILM COATED ORAL DAILY
Qty: 45 TABLET | Refills: 5 | Status: SHIPPED
Start: 2019-10-23 | End: 2020-05-04 | Stop reason: SDUPTHER

## 2019-10-23 NOTE — DISCHARGE SUMMARY
Physician Discharge Summary     Patient ID:  Geneva Amado  [id number]  89 y.o.  1965    Admit date: 10/19/2019    Discharge date and time: 10/22/2019  4:36 PM     Admitting Physician: Grupo Patel MD     Discharge Physician: Grupo Patel    Consults: GI    Admission Diagnoses:   Anemia [D64.9]    Discharge Diagnoses:     Patient Active Problem List   Diagnosis    Anemia    Iron deficiency anemia due to chronic blood loss    Anemia due to GI blood loss       Hospital Course: pt admitted with hgb 4.1 . Transfused to ghb 9.5 4 u prbc's. Had scope upper GI showed gastric and duodenal ulcer. CT abd + ascites but no note of cirrhosis although thought to be the case .  Not enough for diagnostic tap    Discharged home on lasix, aldactone, omeprazole and FE    Discharged Condition: good    Significant Diagnostic Studies: labs: , microbiology: , radiology: CT scan: , cardiac graphics: ECG:  and Echocardiogram:  and endoscopy: gastroscopy:     Treatments: procedures: scope  and blood transfusion 4 u prbc's    Disposition: home    Patient Instructions:     Discharge Medication List as of 10/22/2019  3:15 PM      START taking these medications    Details   omeprazole (PRILOSEC) 40 MG delayed release capsule Take 1 capsule by mouth every morning (before breakfast), Disp-30 capsule, R-5Normal      spironolactone (ALDACTONE) 25 MG tablet Take 1 tablet by mouth daily, Disp-30 tablet, R-5Normal      furosemide (LASIX) 20 MG tablet Take 1 tablet by mouth daily, Disp-30 tablet, R-5Normal      ferrous sulfate (CHESTER-AMANDA) 325 (65 Fe) MG tablet Take 1 tablet by mouth daily (with breakfast), Disp-30 tablet, R-3OTC         CONTINUE these medications which have NOT CHANGED    Details   sertraline (ZOLOFT) 100 MG tablet Take 1.5 tablets by mouth daily, Disp-45 tablet, R-3Phone In         STOP taking these medications       naproxen (NAPROSYN) 250 MG tablet Comments:   Reason for Stopping:             Activity: activity as

## 2019-10-24 ENCOUNTER — TELEPHONE (OUTPATIENT)
Dept: FAMILY MEDICINE CLINIC | Age: 54
End: 2019-10-24

## 2019-11-07 ENCOUNTER — HOSPITAL ENCOUNTER (OUTPATIENT)
Age: 54
Discharge: HOME | End: 2019-11-09
Payer: COMMERCIAL

## 2019-11-07 DIAGNOSIS — D64.9 ANEMIA, UNSPECIFIED TYPE: ICD-10-CM

## 2019-11-07 DIAGNOSIS — R18.8 OTHER ASCITES: ICD-10-CM

## 2019-11-07 DIAGNOSIS — R18.8 OTHER ASCITES: Primary | ICD-10-CM

## 2019-11-07 LAB
ALBUMIN SERPL-MCNC: 2.7 G/DL (ref 3.5–5.2)
ALP BLD-CCNC: 148 U/L (ref 35–104)
ALT SERPL-CCNC: 63 U/L (ref 0–32)
ANION GAP SERPL CALCULATED.3IONS-SCNC: 13 MMOL/L (ref 7–16)
ANISOCYTOSIS: ABNORMAL
AST SERPL-CCNC: 129 U/L (ref 0–31)
BASOPHILS ABSOLUTE: 0 E9/L (ref 0–0.2)
BASOPHILS RELATIVE PERCENT: 0.4 % (ref 0–2)
BILIRUB SERPL-MCNC: 1.7 MG/DL (ref 0–1.2)
BUN BLDV-MCNC: 12 MG/DL (ref 6–20)
CALCIUM SERPL-MCNC: 8.1 MG/DL (ref 8.6–10.2)
CHLORIDE BLD-SCNC: 102 MMOL/L (ref 98–107)
CO2: 19 MMOL/L (ref 22–29)
CREAT SERPL-MCNC: 0.7 MG/DL (ref 0.5–1)
EOSINOPHILS ABSOLUTE: 0.12 E9/L (ref 0.05–0.5)
EOSINOPHILS RELATIVE PERCENT: 0.9 % (ref 0–6)
GFR AFRICAN AMERICAN: >60
GFR NON-AFRICAN AMERICAN: >60 ML/MIN/1.73
GLUCOSE BLD-MCNC: 96 MG/DL (ref 74–99)
HCT VFR BLD CALC: 30.6 % (ref 34–48)
HEMOGLOBIN: 9 G/DL (ref 11.5–15.5)
LYMPHOCYTES ABSOLUTE: 0.28 E9/L (ref 1.5–4)
LYMPHOCYTES RELATIVE PERCENT: 1.7 % (ref 20–42)
MCH RBC QN AUTO: 28.2 PG (ref 26–35)
MCHC RBC AUTO-ENTMCNC: 29.4 % (ref 32–34.5)
MCV RBC AUTO: 95.9 FL (ref 80–99.9)
MONOCYTES ABSOLUTE: 0.41 E9/L (ref 0.1–0.95)
MONOCYTES RELATIVE PERCENT: 2.6 % (ref 2–12)
NEUTROPHILS ABSOLUTE: 13.11 E9/L (ref 1.8–7.3)
NEUTROPHILS RELATIVE PERCENT: 94.8 % (ref 43–80)
PDW BLD-RTO: 25.2 FL (ref 11.5–15)
PLATELET # BLD: 224 E9/L (ref 130–450)
PMV BLD AUTO: 10.9 FL (ref 7–12)
POTASSIUM SERPL-SCNC: 3.8 MMOL/L (ref 3.5–5)
RBC # BLD: 3.19 E12/L (ref 3.5–5.5)
SODIUM BLD-SCNC: 134 MMOL/L (ref 132–146)
TOTAL PROTEIN: 7.4 G/DL (ref 6.4–8.3)
WBC # BLD: 13.8 E9/L (ref 4.5–11.5)

## 2019-11-07 PROCEDURE — 80053 COMPREHEN METABOLIC PANEL: CPT

## 2019-11-07 PROCEDURE — 81596 NFCT DS CHRNC HCV 6 ASSAYS: CPT

## 2019-11-07 PROCEDURE — 85025 COMPLETE CBC W/AUTO DIFF WBC: CPT

## 2019-11-11 DIAGNOSIS — R53.81 DEBILITATED: ICD-10-CM

## 2019-11-11 DIAGNOSIS — R18.8 OTHER ASCITES: Primary | ICD-10-CM

## 2019-11-14 LAB
Lab: NORMAL
REPORT: NORMAL
THIS TEST SENT TO: NORMAL

## 2019-11-18 ENCOUNTER — HOSPITAL ENCOUNTER (OUTPATIENT)
Dept: PHYSICAL THERAPY | Age: 54
Setting detail: THERAPIES SERIES
Discharge: HOME | End: 2019-11-18
Payer: COMMERCIAL

## 2019-11-18 PROCEDURE — 97161 PT EVAL LOW COMPLEX 20 MIN: CPT

## 2019-11-19 ENCOUNTER — HOSPITAL ENCOUNTER (OUTPATIENT)
Dept: PHYSICAL THERAPY | Age: 54
Setting detail: THERAPIES SERIES
End: 2019-11-19
Payer: COMMERCIAL

## 2019-11-21 ENCOUNTER — HOSPITAL ENCOUNTER (OUTPATIENT)
Dept: PHYSICAL THERAPY | Age: 54
Setting detail: THERAPIES SERIES
End: 2019-11-21
Payer: COMMERCIAL

## 2019-11-25 ENCOUNTER — HOSPITAL ENCOUNTER (OUTPATIENT)
Dept: ULTRASOUND IMAGING | Age: 54
Discharge: HOME | End: 2019-11-27
Payer: COMMERCIAL

## 2019-11-25 DIAGNOSIS — K70.0 ALCOHOL INDUCED FATTY LIVER: ICD-10-CM

## 2019-11-25 DIAGNOSIS — R18.8 OTHER ASCITES: ICD-10-CM

## 2019-11-25 PROCEDURE — 76981 USE PARENCHYMA: CPT

## 2019-11-25 PROCEDURE — 76705 ECHO EXAM OF ABDOMEN: CPT

## 2019-11-29 ENCOUNTER — HOSPITAL ENCOUNTER (OUTPATIENT)
Dept: ULTRASOUND IMAGING | Age: 54
Discharge: HOME | End: 2019-12-01
Payer: COMMERCIAL

## 2019-11-29 ENCOUNTER — HOSPITAL ENCOUNTER (OUTPATIENT)
Age: 54
Discharge: HOME | End: 2019-11-29
Payer: COMMERCIAL

## 2019-11-29 VITALS
SYSTOLIC BLOOD PRESSURE: 120 MMHG | DIASTOLIC BLOOD PRESSURE: 71 MMHG | RESPIRATION RATE: 18 BRPM | OXYGEN SATURATION: 100 % | HEART RATE: 80 BPM

## 2019-11-29 DIAGNOSIS — R18.8 OTHER ASCITES: ICD-10-CM

## 2019-11-29 LAB
ALBUMIN FLUID: 0.7 G/DL
ALBUMIN SERPL-MCNC: 3.1 G/DL (ref 3.5–5.2)
ALP BLD-CCNC: 117 U/L (ref 35–104)
ALT SERPL-CCNC: 39 U/L (ref 0–32)
ANION GAP SERPL CALCULATED.3IONS-SCNC: 13 MMOL/L (ref 7–16)
ANISOCYTOSIS: ABNORMAL
APPEARANCE FLUID: CLEAR
AST SERPL-CCNC: 96 U/L (ref 0–31)
BASOPHILS ABSOLUTE: 0 E9/L (ref 0–0.2)
BASOPHILS RELATIVE PERCENT: 0 % (ref 0–2)
BILIRUB SERPL-MCNC: 1.3 MG/DL (ref 0–1.2)
BUN BLDV-MCNC: 9 MG/DL (ref 6–20)
CALCIUM SERPL-MCNC: 8 MG/DL (ref 8.6–10.2)
CELL COUNT FLUID TYPE: NORMAL
CHLORIDE BLD-SCNC: 102 MMOL/L (ref 98–107)
CO2: 19 MMOL/L (ref 22–29)
COLOR FLUID: YELLOW
CREAT SERPL-MCNC: 0.6 MG/DL (ref 0.5–1)
EOSINOPHILS ABSOLUTE: 0.06 E9/L (ref 0.05–0.5)
EOSINOPHILS RELATIVE PERCENT: 0.9 % (ref 0–6)
FLUID TYPE: NORMAL
GFR AFRICAN AMERICAN: >60
GFR NON-AFRICAN AMERICAN: >60 ML/MIN/1.73
GLUCOSE BLD-MCNC: 97 MG/DL (ref 74–99)
HCT VFR BLD CALC: 30.9 % (ref 34–48)
HEMOGLOBIN: 9.3 G/DL (ref 11.5–15.5)
HYPOCHROMIA: ABNORMAL
INR BLD: 1.3
LYMPHOCYTES ABSOLUTE: 1.01 E9/L (ref 1.5–4)
LYMPHOCYTES RELATIVE PERCENT: 14.9 % (ref 20–42)
MCH RBC QN AUTO: 29.9 PG (ref 26–35)
MCHC RBC AUTO-ENTMCNC: 30.1 % (ref 32–34.5)
MCV RBC AUTO: 99.4 FL (ref 80–99.9)
MONOCYTE, FLUID: 98 %
MONOCYTES ABSOLUTE: 0.27 E9/L (ref 0.1–0.95)
MONOCYTES RELATIVE PERCENT: 4.4 % (ref 2–12)
NEUTROPHIL, FLUID: 2 %
NEUTROPHILS ABSOLUTE: 5.36 E9/L (ref 1.8–7.3)
NEUTROPHILS RELATIVE PERCENT: 79.8 % (ref 43–80)
NUCLEATED CELLS FLUID: 48 /UL
NUCLEATED RED BLOOD CELLS: 0.9 /100 WBC
OVALOCYTES: ABNORMAL
PDW BLD-RTO: 24.5 FL (ref 11.5–15)
PLATELET # BLD: 212 E9/L (ref 130–450)
PMV BLD AUTO: 10.3 FL (ref 7–12)
POIKILOCYTES: ABNORMAL
POLYCHROMASIA: ABNORMAL
POTASSIUM SERPL-SCNC: 3.2 MMOL/L (ref 3.5–5)
PROTEIN FLUID: 1.4 G/DL
PROTHROMBIN TIME: 14.6 SEC (ref 9.3–12.4)
RBC # BLD: 3.11 E12/L (ref 3.5–5.5)
RBC FLUID: <2000 /UL
SCHISTOCYTES: ABNORMAL
SODIUM BLD-SCNC: 134 MMOL/L (ref 132–146)
TEAR DROP CELLS: ABNORMAL
TOTAL PROTEIN: 7.5 G/DL (ref 6.4–8.3)
WBC # BLD: 6.7 E9/L (ref 4.5–11.5)

## 2019-11-29 PROCEDURE — 36415 COLL VENOUS BLD VENIPUNCTURE: CPT

## 2019-11-29 PROCEDURE — 85025 COMPLETE CBC W/AUTO DIFF WBC: CPT

## 2019-11-29 PROCEDURE — 88112 CYTOPATH CELL ENHANCE TECH: CPT

## 2019-11-29 PROCEDURE — C1729 CATH, DRAINAGE: HCPCS

## 2019-11-29 PROCEDURE — 85610 PROTHROMBIN TIME: CPT

## 2019-11-29 PROCEDURE — 84157 ASSAY OF PROTEIN OTHER: CPT

## 2019-11-29 PROCEDURE — 88305 TISSUE EXAM BY PATHOLOGIST: CPT

## 2019-11-29 PROCEDURE — 80053 COMPREHEN METABOLIC PANEL: CPT

## 2019-11-29 PROCEDURE — 6360000002 HC RX W HCPCS: Performed by: INTERNAL MEDICINE

## 2019-11-29 PROCEDURE — 82042 OTHER SOURCE ALBUMIN QUAN EA: CPT

## 2019-11-29 PROCEDURE — P9047 ALBUMIN (HUMAN), 25%, 50ML: HCPCS | Performed by: INTERNAL MEDICINE

## 2019-11-29 PROCEDURE — 89051 BODY FLUID CELL COUNT: CPT

## 2019-11-29 RX ORDER — ALBUMIN (HUMAN) 12.5 G/50ML
50 SOLUTION INTRAVENOUS ONCE
Status: COMPLETED | OUTPATIENT
Start: 2019-11-29 | End: 2019-11-29

## 2019-11-29 RX ADMIN — ALBUMIN (HUMAN) 50 G: 0.25 INJECTION, SOLUTION INTRAVENOUS at 12:03

## 2019-12-16 ENCOUNTER — TELEPHONE (OUTPATIENT)
Dept: FAMILY MEDICINE CLINIC | Age: 54
End: 2019-12-16

## 2019-12-16 DIAGNOSIS — Z12.31 SCREENING MAMMOGRAM, ENCOUNTER FOR: Primary | ICD-10-CM

## 2019-12-17 ENCOUNTER — HOSPITAL ENCOUNTER (OUTPATIENT)
Dept: MAMMOGRAPHY | Age: 54
Discharge: HOME | End: 2019-12-19
Payer: COMMERCIAL

## 2019-12-17 DIAGNOSIS — Z12.31 SCREENING MAMMOGRAM, ENCOUNTER FOR: ICD-10-CM

## 2019-12-17 PROCEDURE — 77063 BREAST TOMOSYNTHESIS BI: CPT

## 2019-12-18 ENCOUNTER — HOSPITAL ENCOUNTER (OUTPATIENT)
Dept: PHYSICAL THERAPY | Age: 54
Setting detail: THERAPIES SERIES
Discharge: HOME | End: 2019-12-18
Payer: COMMERCIAL

## 2019-12-18 PROCEDURE — 97110 THERAPEUTIC EXERCISES: CPT

## 2019-12-26 ENCOUNTER — HOSPITAL ENCOUNTER (OUTPATIENT)
Dept: PHYSICAL THERAPY | Age: 54
Setting detail: THERAPIES SERIES
Discharge: HOME | End: 2019-12-26
Payer: COMMERCIAL

## 2019-12-26 PROCEDURE — 97110 THERAPEUTIC EXERCISES: CPT

## 2020-01-07 ENCOUNTER — HOSPITAL ENCOUNTER (OUTPATIENT)
Dept: PHYSICAL THERAPY | Age: 55
Setting detail: THERAPIES SERIES
End: 2020-01-07
Payer: COMMERCIAL

## 2020-01-07 NOTE — PROGRESS NOTES
[facility]  Phone: [phone] Fax: [phone]     Physical Therapy  Cancellation/No-show Note  Patient Name:  Juan Diego Gonzalez  :  1965   Date:  2020    For today's appointment patient:  [x]  Cancelled  []  Rescheduled appointment  []  No-show     Reason given by patient:  []  Patient ill  []  Conflicting appointment  []  No transportation    []  Conflict with work  []  No reason given  [x]  Other:     Comments:  Next PT session 2020    Electronically signed by:  Eric Moses, [facility]

## 2020-01-09 ENCOUNTER — HOSPITAL ENCOUNTER (OUTPATIENT)
Age: 55
Discharge: HOME | End: 2020-01-11
Payer: COMMERCIAL

## 2020-01-09 ENCOUNTER — HOSPITAL ENCOUNTER (OUTPATIENT)
Dept: PHYSICAL THERAPY | Age: 55
Setting detail: THERAPIES SERIES
Discharge: HOME | End: 2020-01-09
Payer: COMMERCIAL

## 2020-01-09 LAB
ALBUMIN SERPL-MCNC: 3.8 G/DL (ref 3.5–5.2)
ALP BLD-CCNC: 153 U/L (ref 35–104)
ALT SERPL-CCNC: 19 U/L (ref 0–32)
ANION GAP SERPL CALCULATED.3IONS-SCNC: 11 MMOL/L (ref 7–16)
AST SERPL-CCNC: 53 U/L (ref 0–31)
BASOPHILS ABSOLUTE: 0.05 E9/L (ref 0–0.2)
BASOPHILS RELATIVE PERCENT: 0.9 % (ref 0–2)
BILIRUB SERPL-MCNC: 0.8 MG/DL (ref 0–1.2)
BUN BLDV-MCNC: 12 MG/DL (ref 6–20)
CALCIUM SERPL-MCNC: 9.3 MG/DL (ref 8.6–10.2)
CHLORIDE BLD-SCNC: 102 MMOL/L (ref 98–107)
CO2: 21 MMOL/L (ref 22–29)
CREAT SERPL-MCNC: 0.9 MG/DL (ref 0.5–1)
EOSINOPHILS ABSOLUTE: 0.17 E9/L (ref 0.05–0.5)
EOSINOPHILS RELATIVE PERCENT: 3 % (ref 0–6)
GFR AFRICAN AMERICAN: >60
GFR NON-AFRICAN AMERICAN: >60 ML/MIN/1.73
GLUCOSE BLD-MCNC: 103 MG/DL (ref 74–99)
HCT VFR BLD CALC: 27.9 % (ref 34–48)
HEMOGLOBIN: 8.2 G/DL (ref 11.5–15.5)
IMMATURE GRANULOCYTES #: 0.01 E9/L
IMMATURE GRANULOCYTES %: 0.2 % (ref 0–5)
LYMPHOCYTES ABSOLUTE: 1.6 E9/L (ref 1.5–4)
LYMPHOCYTES RELATIVE PERCENT: 28 % (ref 20–42)
MCH RBC QN AUTO: 26.9 PG (ref 26–35)
MCHC RBC AUTO-ENTMCNC: 29.4 % (ref 32–34.5)
MCV RBC AUTO: 91.5 FL (ref 80–99.9)
MONOCYTES ABSOLUTE: 0.61 E9/L (ref 0.1–0.95)
MONOCYTES RELATIVE PERCENT: 10.7 % (ref 2–12)
NEUTROPHILS ABSOLUTE: 3.28 E9/L (ref 1.8–7.3)
NEUTROPHILS RELATIVE PERCENT: 57.2 % (ref 43–80)
PDW BLD-RTO: 17.1 FL (ref 11.5–15)
PLATELET # BLD: 204 E9/L (ref 130–450)
PMV BLD AUTO: 10.7 FL (ref 7–12)
POTASSIUM SERPL-SCNC: 4.1 MMOL/L (ref 3.5–5)
RBC # BLD: 3.05 E12/L (ref 3.5–5.5)
SODIUM BLD-SCNC: 134 MMOL/L (ref 132–146)
TOTAL PROTEIN: 9.2 G/DL (ref 6.4–8.3)
WBC # BLD: 5.7 E9/L (ref 4.5–11.5)

## 2020-01-09 PROCEDURE — 97110 THERAPEUTIC EXERCISES: CPT

## 2020-01-09 PROCEDURE — 85025 COMPLETE CBC W/AUTO DIFF WBC: CPT

## 2020-01-09 PROCEDURE — 80053 COMPREHEN METABOLIC PANEL: CPT

## 2020-01-15 ENCOUNTER — HOSPITAL ENCOUNTER (OUTPATIENT)
Dept: PHYSICAL THERAPY | Age: 55
Setting detail: THERAPIES SERIES
Discharge: HOME | End: 2020-01-15
Payer: COMMERCIAL

## 2020-01-15 PROCEDURE — 97110 THERAPEUTIC EXERCISES: CPT

## 2020-01-22 ENCOUNTER — HOSPITAL ENCOUNTER (OUTPATIENT)
Dept: PHYSICAL THERAPY | Age: 55
Setting detail: THERAPIES SERIES
Discharge: HOME | End: 2020-01-22
Payer: COMMERCIAL

## 2020-01-22 PROCEDURE — 97110 THERAPEUTIC EXERCISES: CPT

## 2020-01-22 NOTE — PROGRESS NOTES
[facility]  Phone: [phone] Fax: [phone]       Physical Therapy Daily Treatment Note  Date:  2020    Patient Name:  Verónica Sin    :  1965  MRN: [id number]    Restrictions/Precautions:    Diagnosis:  Deconditioning   Treatment Diagnosis:    Insurance/Certification information:  O  Referring Physician:  Dr Mayuri Azevedo of care signed (Y/N):    Visit# / total visits: 4/  Pain level: /10  Time In:   1435     Time Out: 1510         Subjective:      Exercises:  Exercise/Equipment Resistance/Repetitions Other comments     Bike 10 min      Bridge  10 reps      Isometric Hip Flex   5 reps bilateral  nt   Seated Dumbbell press  43 5 reps     Seated Dumbbell curl 4# 5 reps       Squat 10 reps           Lateral walk  3 laps     T band pull down  10 reps orange     T band row    10 reps     Upright Row  10 reps       UBE 8 min                                                        Other Therapeutic Activities:      Home Exercise Program:      Manual Treatments:      Modalities:      Timed Code Treatment Minutes:  30    Total Treatment Minutes:  40    Treatment/Activity Tolerance:  [x] Patient tolerated treatment well [] Patient limited by fatigue  [] Patient limited by pain  [] Patient limited by other medical complications  [] Other:     Prognosis: [] Good [x] Fair  [] Poor    Patient Requires Follow-up: [x] Yes  [] No    Plan:   [x] Continue per plan of care [] Alter current plan (see comments)  [] Plan of care initiated [] Hold pending MD visit [] Discharge  Plan for Next Session:         Electronically signed by:  Emma Taveras, [address] [place] [address]

## 2020-01-29 ENCOUNTER — HOSPITAL ENCOUNTER (OUTPATIENT)
Dept: PHYSICAL THERAPY | Age: 55
Setting detail: THERAPIES SERIES
Discharge: HOME | End: 2020-01-29
Payer: COMMERCIAL

## 2020-01-29 PROCEDURE — 97110 THERAPEUTIC EXERCISES: CPT

## 2020-01-29 NOTE — PROGRESS NOTES
[facility]  Phone: [phone] Fax: [phone]       Physical Therapy Daily Treatment Note  Date:  2020    Patient Name:  Laura Marti    :  1965  MRN: [id number]    Restrictions/Precautions:    Diagnosis:  Deconditioning   Treatment Diagnosis:    Insurance/Certification information:  O  Referring Physician:  Dr Yefri Matson of care signed (Y/N):    Visit# / total visits: 5/  Pain level: /10  Time In:   1435     Time Out: 1525         Subjective:      Exercises:  Exercise/Equipment Resistance/Repetitions Other comments     Bike 10 min      Bridge  10 reps          Seated Dumbbell press  43 5 reps     Seated Dumbbell curl 4# 5 reps       Squat 10 reps           Lateral walk  3 laps     T band pull down  10 reps orange     T band row    10 reps     Upright Row  10 reps       UBE 8 min                                                        Other Therapeutic Activities:      Home Exercise Program:      Manual Treatments:      Modalities:      Timed Code Treatment Minutes:  30    Total Treatment Minutes:  40    Treatment/Activity Tolerance:  [x] Patient tolerated treatment well [] Patient limited by fatigue  [] Patient limited by pain  [] Patient limited by other medical complications  [] Other:     Prognosis: [] Good [x] Fair  [] Poor    Patient Requires Follow-up: [x] Yes  [] No    Plan:   [x] Continue per plan of care [] Alter current plan (see comments)  [] Plan of care initiated [] Hold pending MD visit [] Discharge  Plan for Next Session:         Electronically signed by:  No Upton, [address] [place] [address]

## 2020-02-05 ENCOUNTER — HOSPITAL ENCOUNTER (OUTPATIENT)
Dept: PHYSICAL THERAPY | Age: 55
Setting detail: THERAPIES SERIES
End: 2020-02-05
Payer: COMMERCIAL

## 2020-02-10 ENCOUNTER — HOSPITAL ENCOUNTER (OUTPATIENT)
Dept: PHYSICAL THERAPY | Age: 55
Setting detail: THERAPIES SERIES
Discharge: HOME | End: 2020-02-10
Payer: COMMERCIAL

## 2020-02-10 PROCEDURE — 97110 THERAPEUTIC EXERCISES: CPT

## 2020-02-10 NOTE — PROGRESS NOTES
[facility]  Phone: [phone] Fax: [phone]       Physical Therapy Daily Treatment Note  Date:  2/10/2020    Patient Name:  Farhad William    :  1965  MRN: [id number]    Restrictions/Precautions:    Diagnosis:  Deconditioning   Treatment Diagnosis:    Insurance/Certification information:  MMO  Referring Physician:  Dr Katia Lamb of care signed (Y/N):    Visit# / total visits: 5/  Pain level: /10  Time In:   1430     Time Out: 1520         Subjective:      Exercises:  Exercise/Equipment Resistance/Repetitions Other comments     Bike 10 min      Bridge  10 reps          Seated Dumbbell press  43 5 reps     Seated Dumbbell curl 4# 5 reps       Squat 10 reps 6#          Lateral walk  3 laps     T band pull down  10 reps orange     T band row    10 reps     Upright Row  10 reps       UBE 8 min  nt                                                      Other Therapeutic Activities:      Home Exercise Program:      Manual Treatments:      Modalities:      Timed Code Treatment Minutes:  30    Total Treatment Minutes:  40    Treatment/Activity Tolerance:  [x] Patient tolerated treatment well [] Patient limited by fatigue  [] Patient limited by pain  [] Patient limited by other medical complications  [] Other:     Prognosis: [] Good [x] Fair  [] Poor    Patient Requires Follow-up: [x] Yes  [] No    Plan:   [x] Continue per plan of care [] Alter current plan (see comments)  [] Plan of care initiated [] Hold pending MD visit [] Discharge  Plan for Next Session:         Electronically signed by:  Margi Styles, [address] [place] [address]

## 2020-02-19 ENCOUNTER — HOSPITAL ENCOUNTER (OUTPATIENT)
Dept: PHYSICAL THERAPY | Age: 55
Setting detail: THERAPIES SERIES
Discharge: HOME | End: 2020-02-19
Payer: COMMERCIAL

## 2020-02-19 PROCEDURE — 97110 THERAPEUTIC EXERCISES: CPT

## 2020-02-19 NOTE — PROGRESS NOTES
[facility]  Phone: [phone] Fax: [phone]       Physical Therapy Daily Treatment Note  Date:  2020    Patient Name:  Marty Cheng    :  1965  MRN: [id number]    Restrictions/Precautions:    Diagnosis:  Deconditioning   Treatment Diagnosis:    Insurance/Certification information:  MMO  Referring Physician:  Dr Chivo Larry  of care signed (Y/N):    Visit# / total visits: 6/  Pain level: /10  Time In:   1430     Time Out: 1520         Subjective:  No problems or pain issues noted by patient     Exercises:  Exercise/Equipment Resistance/Repetitions Other comments     Bike 10 min      Bridge  10 reps          Seated Dumbbell press  43 5 reps     Seated Dumbbell curl 4# 5 reps       Squat 10 reps 6#          Lateral walk  3 laps     T band pull down  10 reps orange     T band row    10 reps     Upright Row  10 reps       UBE 8 min  nt                                                      Other Therapeutic Activities:      Home Exercise Program:      Manual Treatments:      Modalities:      Timed Code Treatment Minutes:  30    Total Treatment Minutes:  40    Treatment/Activity Tolerance:  [x] Patient tolerated treatment well [] Patient limited by fatigue  [] Patient limited by pain  [] Patient limited by other medical complications  [] Other:     Prognosis: [] Good [x] Fair  [] Poor    Patient Requires Follow-up: [x] Yes  [] No    Plan:   [x] Continue per plan of care [] Alter current plan (see comments)  [] Plan of care initiated [] Hold pending MD visit [] Discharge  Plan for Next Session:         Electronically signed by:  Jamilah Mott, [address] [place] [address]
13-Nov-2017 05:50

## 2020-02-24 ENCOUNTER — HOSPITAL ENCOUNTER (OUTPATIENT)
Dept: PHYSICAL THERAPY | Age: 55
Setting detail: THERAPIES SERIES
Discharge: HOME | End: 2020-02-24
Payer: COMMERCIAL

## 2020-02-24 PROCEDURE — 97110 THERAPEUTIC EXERCISES: CPT

## 2020-02-24 NOTE — PROGRESS NOTES
[facility]  Phone: [phone] Fax: [phone]       Physical Therapy Daily Treatment Note  Date:  2020    Patient Name:  Jeronimo Garcia    :  1965  MRN: [id number]    Restrictions/Precautions:    Diagnosis:  Deconditioning   Treatment Diagnosis:    Insurance/Certification information:  O  Referring Physician:  Dr Lara Paul of care signed (Y/N):    Visit# / total visits: 7/  Pain level: /10  Time In:   1430     Time Out: 1520         Subjective:      Exercises:  Exercise/Equipment Resistance/Repetitions Other comments     Bike 10 min      Bridge  10 reps          Seated Dumbbell press  4# 10 reps     Seated Dumbbell curl 4# 10 reps       Squat 10 reps 6#          Lateral walk  3 laps resisted     T band pull down  10 reps blue      T band row    10 reps     Upright Row  10 reps       UBE 8 min                                                        Other Therapeutic Activities:      Home Exercise Program:      Manual Treatments:      Modalities:      Timed Code Treatment Minutes:  30    Total Treatment Minutes:  40    Treatment/Activity Tolerance:  [x] Patient tolerated treatment well [] Patient limited by fatigue  [] Patient limited by pain  [] Patient limited by other medical complications  [] Other:     Prognosis: [] Good [x] Fair  [] Poor    Patient Requires Follow-up: [x] Yes  [] No    Plan:   [x] Continue per plan of care [] Alter current plan (see comments)  [] Plan of care initiated [] Hold pending MD visit [] Discharge  Plan for Next Session:         Electronically signed by:  Marline Cuadra, [address] [place] [address]

## 2020-03-04 ENCOUNTER — HOSPITAL ENCOUNTER (OUTPATIENT)
Dept: PHYSICAL THERAPY | Age: 55
Setting detail: THERAPIES SERIES
Discharge: HOME | End: 2020-03-04
Payer: COMMERCIAL

## 2020-03-04 PROCEDURE — 97110 THERAPEUTIC EXERCISES: CPT

## 2020-03-04 NOTE — PROGRESS NOTES
[facility]  Phone: [phone] Fax: [phone]       Physical Therapy Daily Treatment Note  Date:  3/4/2020    Patient Name:  Cathy Chao    :  1965  MRN: [id number]    Restrictions/Precautions:    Diagnosis:  Deconditioning   Treatment Diagnosis:    Insurance/Certification information:  O  Referring Physician:  Dr Jessica Crowley of care signed (Y/N):    Visit# / total visits: 8/  Pain level: /10  Time In:   1430     Time Out: 1520         Subjective:      Exercises:  Exercise/Equipment Resistance/Repetitions Other comments     Bike 10 min      Bridge  10 reps          Seated Dumbbell press  4# 10 reps     Seated Dumbbell curl 4# 10 reps       Squat 10 reps 6#          Lateral walk  3 laps resisted     T band pull down  10 reps blue      T band row    10 reps     Upright Row  10 reps       UBE 8 min                                                        Other Therapeutic Activities:      Home Exercise Program:      Manual Treatments:      Modalities:      Timed Code Treatment Minutes:  30    Total Treatment Minutes:  40    Treatment/Activity Tolerance:  [x] Patient tolerated treatment well [] Patient limited by fatigue  [] Patient limited by pain  [] Patient limited by other medical complications  [] Other:     Prognosis: [] Good [x] Fair  [] Poor    Patient Requires Follow-up: [x] Yes  [] No    Plan:   [x] Continue per plan of care [] Alter current plan (see comments)  [] Plan of care initiated [] Hold pending MD visit [] Discharge  Plan for Next Session:         Electronically signed by:  Sun Erwin, [address] [place] [address]

## 2020-03-11 ENCOUNTER — HOSPITAL ENCOUNTER (OUTPATIENT)
Dept: PHYSICAL THERAPY | Age: 55
Setting detail: THERAPIES SERIES
Discharge: HOME | End: 2020-03-11
Payer: COMMERCIAL

## 2020-03-11 PROCEDURE — 97110 THERAPEUTIC EXERCISES: CPT

## 2020-03-24 ENCOUNTER — HOSPITAL ENCOUNTER (OUTPATIENT)
Age: 55
Discharge: HOME | End: 2020-03-26
Payer: COMMERCIAL

## 2020-03-24 LAB
ALBUMIN SERPL-MCNC: 4.2 G/DL (ref 3.5–5.2)
ALP BLD-CCNC: 132 U/L (ref 35–104)
ALT SERPL-CCNC: 12 U/L (ref 0–32)
ANION GAP SERPL CALCULATED.3IONS-SCNC: 15 MMOL/L (ref 7–16)
AST SERPL-CCNC: 29 U/L (ref 0–31)
BILIRUB SERPL-MCNC: 0.6 MG/DL (ref 0–1.2)
BUN BLDV-MCNC: 9 MG/DL (ref 6–20)
CALCIUM SERPL-MCNC: 9.6 MG/DL (ref 8.6–10.2)
CHLORIDE BLD-SCNC: 104 MMOL/L (ref 98–107)
CO2: 21 MMOL/L (ref 22–29)
CREAT SERPL-MCNC: 0.7 MG/DL (ref 0.5–1)
GFR AFRICAN AMERICAN: >60
GFR NON-AFRICAN AMERICAN: >60 ML/MIN/1.73
GLUCOSE BLD-MCNC: 124 MG/DL (ref 74–99)
HCT VFR BLD CALC: 37.9 % (ref 34–48)
HEMOGLOBIN: 12 G/DL (ref 11.5–15.5)
MCH RBC QN AUTO: 32.4 PG (ref 26–35)
MCHC RBC AUTO-ENTMCNC: 31.7 % (ref 32–34.5)
MCV RBC AUTO: 102.4 FL (ref 80–99.9)
PDW BLD-RTO: 16.9 FL (ref 11.5–15)
PLATELET # BLD: 162 E9/L (ref 130–450)
PMV BLD AUTO: 9.7 FL (ref 7–12)
POTASSIUM SERPL-SCNC: 4.4 MMOL/L (ref 3.5–5)
RBC # BLD: 3.7 E12/L (ref 3.5–5.5)
SODIUM BLD-SCNC: 140 MMOL/L (ref 132–146)
TOTAL PROTEIN: 8.6 G/DL (ref 6.4–8.3)
WBC # BLD: 3.7 E9/L (ref 4.5–11.5)

## 2020-03-24 PROCEDURE — 85027 COMPLETE CBC AUTOMATED: CPT

## 2020-03-24 PROCEDURE — 80053 COMPREHEN METABOLIC PANEL: CPT

## 2020-03-25 ENCOUNTER — HOSPITAL ENCOUNTER (OUTPATIENT)
Dept: PHYSICAL THERAPY | Age: 55
Setting detail: THERAPIES SERIES
End: 2020-03-25
Payer: COMMERCIAL

## 2020-05-04 RX ORDER — SERTRALINE HYDROCHLORIDE 100 MG/1
150 TABLET, FILM COATED ORAL DAILY
Qty: 45 TABLET | Refills: 5 | Status: SHIPPED
Start: 2020-05-04 | End: 2020-11-10 | Stop reason: SDUPTHER

## 2020-06-30 ENCOUNTER — APPOINTMENT (OUTPATIENT)
Dept: GENERAL RADIOLOGY | Age: 55
DRG: 280 | End: 2020-06-30
Payer: COMMERCIAL

## 2020-06-30 ENCOUNTER — APPOINTMENT (OUTPATIENT)
Dept: CT IMAGING | Age: 55
DRG: 280 | End: 2020-06-30
Payer: COMMERCIAL

## 2020-06-30 ENCOUNTER — HOSPITAL ENCOUNTER (INPATIENT)
Age: 55
LOS: 6 days | Discharge: HOME | DRG: 280 | End: 2020-07-07
Attending: EMERGENCY MEDICINE | Admitting: FAMILY MEDICINE
Payer: COMMERCIAL

## 2020-06-30 LAB
ALBUMIN SERPL-MCNC: 3.3 G/DL (ref 3.5–5.2)
ALP BLD-CCNC: 150 U/L (ref 35–104)
ALT SERPL-CCNC: 380 U/L (ref 0–32)
ANION GAP SERPL CALCULATED.3IONS-SCNC: 32 MMOL/L (ref 7–16)
AST SERPL-CCNC: 1727 U/L (ref 0–31)
BILIRUB SERPL-MCNC: 3.3 MG/DL (ref 0–1.2)
BUN BLDV-MCNC: 130 MG/DL (ref 6–20)
CALCIUM SERPL-MCNC: 7.7 MG/DL (ref 8.6–10.2)
CHLORIDE BLD-SCNC: 88 MMOL/L (ref 98–107)
CHP ED QC CHECK: NORMAL
CO2: 14 MMOL/L (ref 22–29)
CREAT SERPL-MCNC: 3.3 MG/DL (ref 0.5–1)
GFR AFRICAN AMERICAN: 18
GFR NON-AFRICAN AMERICAN: 15 ML/MIN/1.73
GLUCOSE BLD-MCNC: 109 MG/DL
GLUCOSE BLD-MCNC: 118 MG/DL (ref 74–99)
MAGNESIUM: 2.8 MG/DL (ref 1.6–2.6)
METER GLUCOSE: 109 MG/DL (ref 74–99)
POTASSIUM REFLEX MAGNESIUM: 3.1 MMOL/L (ref 3.5–5)
SODIUM BLD-SCNC: 134 MMOL/L (ref 132–146)
TOTAL PROTEIN: 7.7 G/DL (ref 6.4–8.3)
TROPONIN: 0.15 NG/ML (ref 0–0.03)

## 2020-06-30 PROCEDURE — 80307 DRUG TEST PRSMV CHEM ANLYZR: CPT

## 2020-06-30 PROCEDURE — 74176 CT ABD & PELVIS W/O CONTRAST: CPT

## 2020-06-30 PROCEDURE — 70450 CT HEAD/BRAIN W/O DYE: CPT

## 2020-06-30 PROCEDURE — 85025 COMPLETE CBC W/AUTO DIFF WBC: CPT

## 2020-06-30 PROCEDURE — 80076 HEPATIC FUNCTION PANEL: CPT

## 2020-06-30 PROCEDURE — 82140 ASSAY OF AMMONIA: CPT

## 2020-06-30 PROCEDURE — 84484 ASSAY OF TROPONIN QUANT: CPT

## 2020-06-30 PROCEDURE — 71045 X-RAY EXAM CHEST 1 VIEW: CPT

## 2020-06-30 PROCEDURE — 82962 GLUCOSE BLOOD TEST: CPT

## 2020-06-30 PROCEDURE — G0480 DRUG TEST DEF 1-7 CLASSES: HCPCS

## 2020-06-30 PROCEDURE — 80053 COMPREHEN METABOLIC PANEL: CPT

## 2020-06-30 PROCEDURE — 83735 ASSAY OF MAGNESIUM: CPT

## 2020-06-30 PROCEDURE — 2580000003 HC RX 258: Performed by: STUDENT IN AN ORGANIZED HEALTH CARE EDUCATION/TRAINING PROGRAM

## 2020-06-30 PROCEDURE — 99285 EMERGENCY DEPT VISIT HI MDM: CPT

## 2020-06-30 PROCEDURE — 36415 COLL VENOUS BLD VENIPUNCTURE: CPT

## 2020-06-30 RX ORDER — 0.9 % SODIUM CHLORIDE 0.9 %
1000 INTRAVENOUS SOLUTION INTRAVENOUS ONCE
Status: COMPLETED | OUTPATIENT
Start: 2020-06-30 | End: 2020-07-01

## 2020-06-30 RX ADMIN — SODIUM CHLORIDE 1000 ML: 9 INJECTION, SOLUTION INTRAVENOUS at 23:15

## 2020-07-01 PROBLEM — K76.82 HEPATIC ENCEPHALOPATHY (HCC): Status: ACTIVE | Noted: 2020-07-01

## 2020-07-01 LAB
ABO/RH: NORMAL
ACETAMINOPHEN LEVEL: <5 MCG/ML (ref 10–30)
ALBUMIN SERPL-MCNC: 2.5 G/DL (ref 3.5–5.2)
ALP BLD-CCNC: 99 U/L (ref 35–104)
ALT SERPL-CCNC: 265 U/L (ref 0–32)
AMMONIA: 136 UMOL/L (ref 11–51)
AMMONIA: 189.1 UMOL/L (ref 11–51)
AMPHETAMINE SCREEN, URINE: NOT DETECTED
ANION GAP SERPL CALCULATED.3IONS-SCNC: 15 MMOL/L (ref 7–16)
ANION GAP SERPL CALCULATED.3IONS-SCNC: 22 MMOL/L (ref 7–16)
ANISOCYTOSIS: ABNORMAL
ANISOCYTOSIS: ABNORMAL
ANTIBODY SCREEN: NORMAL
APTT: 31.5 SEC (ref 24.5–35.1)
AST SERPL-CCNC: 1054 U/L (ref 0–31)
B.E.: -10.4 MMOL/L (ref -3–3)
B.E.: -2.7 MMOL/L (ref -3–3)
BACTERIA: ABNORMAL /HPF
BARBITURATE SCREEN URINE: NOT DETECTED
BASOPHILS ABSOLUTE: 0.01 E9/L (ref 0–0.2)
BASOPHILS ABSOLUTE: 0.04 E9/L (ref 0–0.2)
BASOPHILS RELATIVE PERCENT: 0.1 % (ref 0–2)
BASOPHILS RELATIVE PERCENT: 0.3 % (ref 0–2)
BENZODIAZEPINE SCREEN, URINE: NOT DETECTED
BILIRUB SERPL-MCNC: 2.3 MG/DL (ref 0–1.2)
BILIRUBIN DIRECT: 2.6 MG/DL (ref 0–0.3)
BILIRUBIN URINE: ABNORMAL
BILIRUBIN, INDIRECT: 0.7 MG/DL (ref 0–1)
BLOOD BANK DISPENSE STATUS: NORMAL
BLOOD BANK PRODUCT CODE: NORMAL
BLOOD, URINE: ABNORMAL
BPU ID: NORMAL
BUN BLDV-MCNC: 111 MG/DL (ref 6–20)
BUN BLDV-MCNC: 120 MG/DL (ref 6–20)
CALCIUM SERPL-MCNC: 6.4 MG/DL (ref 8.6–10.2)
CALCIUM SERPL-MCNC: 6.7 MG/DL (ref 8.6–10.2)
CANNABINOID SCREEN URINE: NOT DETECTED
CHLORIDE BLD-SCNC: 106 MMOL/L (ref 98–107)
CHLORIDE BLD-SCNC: 110 MMOL/L (ref 98–107)
CHLORIDE URINE RANDOM: <20 MMOL/L
CLARITY: ABNORMAL
CO2: 12 MMOL/L (ref 22–29)
CO2: 16 MMOL/L (ref 22–29)
COCAINE METABOLITE SCREEN URINE: NOT DETECTED
COHB: 0.3 % (ref 0–1.5)
COHB: 0.4 % (ref 0–1.5)
COLOR: YELLOW
CREAT SERPL-MCNC: 2.2 MG/DL (ref 0.5–1)
CREAT SERPL-MCNC: 2.6 MG/DL (ref 0.5–1)
CRITICAL: ABNORMAL
CRITICAL: ABNORMAL
DATE ANALYZED: ABNORMAL
DATE ANALYZED: ABNORMAL
DATE OF COLLECTION: ABNORMAL
DATE OF COLLECTION: ABNORMAL
DESCRIPTION BLOOD BANK: NORMAL
EKG ATRIAL RATE: 103 BPM
EKG P AXIS: 43 DEGREES
EKG P-R INTERVAL: 148 MS
EKG Q-T INTERVAL: 392 MS
EKG QRS DURATION: 86 MS
EKG QTC CALCULATION (BAZETT): 513 MS
EKG R AXIS: 41 DEGREES
EKG T AXIS: 41 DEGREES
EKG VENTRICULAR RATE: 103 BPM
EOSINOPHILS ABSOLUTE: 0.02 E9/L (ref 0.05–0.5)
EOSINOPHILS ABSOLUTE: 0.06 E9/L (ref 0.05–0.5)
EOSINOPHILS RELATIVE PERCENT: 0.2 % (ref 0–6)
EOSINOPHILS RELATIVE PERCENT: 0.5 % (ref 0–6)
ETHANOL: <10 MG/DL (ref 0–0.08)
FENTANYL SCREEN, URINE: NOT DETECTED
FOLATE: >20 NG/ML (ref 4.8–24.2)
GFR AFRICAN AMERICAN: 23
GFR AFRICAN AMERICAN: 28
GFR NON-AFRICAN AMERICAN: 19 ML/MIN/1.73
GFR NON-AFRICAN AMERICAN: 23 ML/MIN/1.73
GLUCOSE BLD-MCNC: 134 MG/DL (ref 74–99)
GLUCOSE BLD-MCNC: 182 MG/DL (ref 74–99)
GLUCOSE URINE: NEGATIVE MG/DL
HCO3: 11.9 MMOL/L (ref 22–26)
HCO3: 20.2 MMOL/L (ref 22–26)
HCT VFR BLD CALC: 19.3 % (ref 34–48)
HCT VFR BLD CALC: 21.8 % (ref 34–48)
HCT VFR BLD CALC: 22.9 % (ref 34–48)
HCT VFR BLD CALC: 35.5 % (ref 34–48)
HEMOGLOBIN: 12.4 G/DL (ref 11.5–15.5)
HEMOGLOBIN: 6.7 G/DL (ref 11.5–15.5)
HEMOGLOBIN: 7.4 G/DL (ref 11.5–15.5)
HEMOGLOBIN: 7.8 G/DL (ref 11.5–15.5)
HHB: 1.6 % (ref 0–5)
HHB: 1.9 % (ref 0–5)
IMMATURE GRANULOCYTES #: 0.14 E9/L
IMMATURE GRANULOCYTES #: 0.21 E9/L
IMMATURE GRANULOCYTES %: 1.2 % (ref 0–5)
IMMATURE GRANULOCYTES %: 1.7 % (ref 0–5)
INR BLD: 1.4
KETONES, URINE: ABNORMAL MG/DL
LAB: ABNORMAL
LAB: ABNORMAL
LACTATE DEHYDROGENASE: 813 U/L (ref 135–214)
LACTIC ACID: 1.3 MMOL/L (ref 0.5–2.2)
LEUKOCYTE ESTERASE, URINE: ABNORMAL
LYMPHOCYTES ABSOLUTE: 0.46 E9/L (ref 1.5–4)
LYMPHOCYTES ABSOLUTE: 0.57 E9/L (ref 1.5–4)
LYMPHOCYTES RELATIVE PERCENT: 3.6 % (ref 20–42)
LYMPHOCYTES RELATIVE PERCENT: 5.1 % (ref 20–42)
Lab: ABNORMAL
Lab: ABNORMAL
Lab: NORMAL
MCH RBC QN AUTO: 39.6 PG (ref 26–35)
MCH RBC QN AUTO: 40.2 PG (ref 26–35)
MCHC RBC AUTO-ENTMCNC: 34.1 % (ref 32–34.5)
MCHC RBC AUTO-ENTMCNC: 34.9 % (ref 32–34.5)
MCV RBC AUTO: 113.4 FL (ref 80–99.9)
MCV RBC AUTO: 118 FL (ref 80–99.9)
METHADONE SCREEN, URINE: NOT DETECTED
METHB: 0.3 % (ref 0–1.5)
METHB: 0.4 % (ref 0–1.5)
MODE: ABNORMAL
MODE: ABNORMAL
MONOCYTES ABSOLUTE: 1.33 E9/L (ref 0.1–0.95)
MONOCYTES ABSOLUTE: 1.55 E9/L (ref 0.1–0.95)
MONOCYTES RELATIVE PERCENT: 10.5 % (ref 2–12)
MONOCYTES RELATIVE PERCENT: 13.8 % (ref 2–12)
NEUTROPHILS ABSOLUTE: 10.58 E9/L (ref 1.8–7.3)
NEUTROPHILS ABSOLUTE: 8.93 E9/L (ref 1.8–7.3)
NEUTROPHILS RELATIVE PERCENT: 79.6 % (ref 43–80)
NEUTROPHILS RELATIVE PERCENT: 83.4 % (ref 43–80)
NITRITE, URINE: NEGATIVE
O2 CONTENT: 10.3 ML/DL
O2 CONTENT: 14.8 ML/DL
O2 SATURATION: 98.1 % (ref 92–98.5)
O2 SATURATION: 98.4 % (ref 92–98.5)
O2HB: 97.5 % (ref 94–97)
O2HB: 97.6 % (ref 94–97)
OPERATOR ID: 1358
OPERATOR ID: 2485
OPIATE SCREEN URINE: NOT DETECTED
OXYCODONE URINE: NOT DETECTED
PAPPENHEIMER BODIES: ABNORMAL
PAPPENHEIMER BODIES: ABNORMAL
PATIENT TEMP: 37 C
PATIENT TEMP: 37 C
PCO2: 18.1 MMHG (ref 35–45)
PCO2: 27.2 MMHG (ref 35–45)
PDW BLD-RTO: 15.5 FL (ref 11.5–15)
PDW BLD-RTO: 15.7 FL (ref 11.5–15)
PH BLOOD GAS: 7.43 (ref 7.35–7.45)
PH BLOOD GAS: 7.49 (ref 7.35–7.45)
PH UA: 6 (ref 5–9)
PHENCYCLIDINE SCREEN URINE: NOT DETECTED
PLATELET # BLD: 110 E9/L (ref 130–450)
PLATELET # BLD: 145 E9/L (ref 130–450)
PMV BLD AUTO: 9.6 FL (ref 7–12)
PMV BLD AUTO: 9.9 FL (ref 7–12)
PO2: 120.9 MMHG (ref 75–100)
PO2: 129.7 MMHG (ref 75–100)
POLYCHROMASIA: ABNORMAL
POLYCHROMASIA: ABNORMAL
POTASSIUM SERPL-SCNC: 3.4 MMOL/L (ref 3.5–5)
POTASSIUM SERPL-SCNC: 3.8 MMOL/L (ref 3.5–5)
POTASSIUM, UR: 35.3 MMOL/L
PROTEIN UA: 30 MG/DL
PROTHROMBIN TIME: 17.1 SEC (ref 9.3–12.4)
RBC # BLD: 1.94 E12/L (ref 3.5–5.5)
RBC # BLD: 3.13 E12/L (ref 3.5–5.5)
RBC UA: ABNORMAL /HPF (ref 0–2)
SALICYLATE, SERUM: <0.3 MG/DL (ref 0–30)
SODIUM BLD-SCNC: 140 MMOL/L (ref 132–146)
SODIUM BLD-SCNC: 141 MMOL/L (ref 132–146)
SODIUM URINE: 22 MMOL/L
SOURCE, BLOOD GAS: ABNORMAL
SOURCE, BLOOD GAS: ABNORMAL
SPECIFIC GRAVITY UA: 1.01 (ref 1–1.03)
THB: 10.6 G/DL (ref 11.5–16.5)
THB: 7.3 G/DL (ref 11.5–16.5)
TIME ANALYZED: 132
TIME ANALYZED: 2201
TOTAL CK: 9960 U/L (ref 20–180)
TOTAL PROTEIN: 5.4 G/DL (ref 6.4–8.3)
TRICYCLIC ANTIDEPRESSANTS SCREEN SERUM: NEGATIVE NG/ML
TROPONIN: 0.07 NG/ML (ref 0–0.03)
TROPONIN: 0.1 NG/ML (ref 0–0.03)
UROBILINOGEN, URINE: 0.2 E.U./DL
VITAMIN B-12: >2000 PG/ML (ref 211–946)
WBC # BLD: 11.2 E9/L (ref 4.5–11.5)
WBC # BLD: 12.7 E9/L (ref 4.5–11.5)
WBC UA: ABNORMAL /HPF (ref 0–5)

## 2020-07-01 PROCEDURE — 87040 BLOOD CULTURE FOR BACTERIA: CPT

## 2020-07-01 PROCEDURE — 96365 THER/PROPH/DIAG IV INF INIT: CPT

## 2020-07-01 PROCEDURE — 36415 COLL VENOUS BLD VENIPUNCTURE: CPT

## 2020-07-01 PROCEDURE — 86901 BLOOD TYPING SEROLOGIC RH(D): CPT

## 2020-07-01 PROCEDURE — 2500000003 HC RX 250 WO HCPCS: Performed by: INTERNAL MEDICINE

## 2020-07-01 PROCEDURE — 51702 INSERT TEMP BLADDER CATH: CPT

## 2020-07-01 PROCEDURE — 80048 BASIC METABOLIC PNL TOTAL CA: CPT

## 2020-07-01 PROCEDURE — 85025 COMPLETE CBC W/AUTO DIFF WBC: CPT

## 2020-07-01 PROCEDURE — 6370000000 HC RX 637 (ALT 250 FOR IP): Performed by: INTERNAL MEDICINE

## 2020-07-01 PROCEDURE — 84133 ASSAY OF URINE POTASSIUM: CPT

## 2020-07-01 PROCEDURE — C9113 INJ PANTOPRAZOLE SODIUM, VIA: HCPCS | Performed by: EMERGENCY MEDICINE

## 2020-07-01 PROCEDURE — 86923 COMPATIBILITY TEST ELECTRIC: CPT

## 2020-07-01 PROCEDURE — 84300 ASSAY OF URINE SODIUM: CPT

## 2020-07-01 PROCEDURE — 82140 ASSAY OF AMMONIA: CPT

## 2020-07-01 PROCEDURE — 83605 ASSAY OF LACTIC ACID: CPT

## 2020-07-01 PROCEDURE — 84484 ASSAY OF TROPONIN QUANT: CPT

## 2020-07-01 PROCEDURE — 82746 ASSAY OF FOLIC ACID SERUM: CPT

## 2020-07-01 PROCEDURE — 85014 HEMATOCRIT: CPT

## 2020-07-01 PROCEDURE — 6360000002 HC RX W HCPCS: Performed by: STUDENT IN AN ORGANIZED HEALTH CARE EDUCATION/TRAINING PROGRAM

## 2020-07-01 PROCEDURE — 2580000003 HC RX 258: Performed by: STUDENT IN AN ORGANIZED HEALTH CARE EDUCATION/TRAINING PROGRAM

## 2020-07-01 PROCEDURE — 85610 PROTHROMBIN TIME: CPT

## 2020-07-01 PROCEDURE — 86850 RBC ANTIBODY SCREEN: CPT

## 2020-07-01 PROCEDURE — 96375 TX/PRO/DX INJ NEW DRUG ADDON: CPT

## 2020-07-01 PROCEDURE — 2580000003 HC RX 258: Performed by: GENERAL PRACTICE

## 2020-07-01 PROCEDURE — 99223 1ST HOSP IP/OBS HIGH 75: CPT | Performed by: FAMILY MEDICINE

## 2020-07-01 PROCEDURE — 82607 VITAMIN B-12: CPT

## 2020-07-01 PROCEDURE — P9016 RBC LEUKOCYTES REDUCED: HCPCS

## 2020-07-01 PROCEDURE — 85730 THROMBOPLASTIN TIME PARTIAL: CPT

## 2020-07-01 PROCEDURE — 6360000002 HC RX W HCPCS: Performed by: EMERGENCY MEDICINE

## 2020-07-01 PROCEDURE — 2580000003 HC RX 258: Performed by: EMERGENCY MEDICINE

## 2020-07-01 PROCEDURE — 2000000000 HC ICU R&B

## 2020-07-01 PROCEDURE — 86900 BLOOD TYPING SEROLOGIC ABO: CPT

## 2020-07-01 PROCEDURE — 82805 BLOOD GASES W/O2 SATURATION: CPT

## 2020-07-01 PROCEDURE — 82436 ASSAY OF URINE CHLORIDE: CPT

## 2020-07-01 PROCEDURE — 83615 LACTATE (LD) (LDH) ENZYME: CPT

## 2020-07-01 PROCEDURE — 6360000002 HC RX W HCPCS: Performed by: INTERNAL MEDICINE

## 2020-07-01 PROCEDURE — 81001 URINALYSIS AUTO W/SCOPE: CPT

## 2020-07-01 PROCEDURE — 6370000000 HC RX 637 (ALT 250 FOR IP): Performed by: EMERGENCY MEDICINE

## 2020-07-01 PROCEDURE — 80053 COMPREHEN METABOLIC PANEL: CPT

## 2020-07-01 PROCEDURE — 93010 ELECTROCARDIOGRAM REPORT: CPT | Performed by: INTERNAL MEDICINE

## 2020-07-01 PROCEDURE — 87081 CULTURE SCREEN ONLY: CPT

## 2020-07-01 PROCEDURE — 93005 ELECTROCARDIOGRAM TRACING: CPT | Performed by: STUDENT IN AN ORGANIZED HEALTH CARE EDUCATION/TRAINING PROGRAM

## 2020-07-01 PROCEDURE — 2580000003 HC RX 258: Performed by: INTERNAL MEDICINE

## 2020-07-01 PROCEDURE — 82550 ASSAY OF CK (CPK): CPT

## 2020-07-01 PROCEDURE — 80307 DRUG TEST PRSMV CHEM ANLYZR: CPT

## 2020-07-01 PROCEDURE — 36430 TRANSFUSION BLD/BLD COMPNT: CPT

## 2020-07-01 PROCEDURE — C9113 INJ PANTOPRAZOLE SODIUM, VIA: HCPCS | Performed by: INTERNAL MEDICINE

## 2020-07-01 PROCEDURE — 85018 HEMOGLOBIN: CPT

## 2020-07-01 RX ORDER — PANTOPRAZOLE SODIUM 40 MG/10ML
80 INJECTION, POWDER, LYOPHILIZED, FOR SOLUTION INTRAVENOUS ONCE
Status: COMPLETED | OUTPATIENT
Start: 2020-07-01 | End: 2020-07-01

## 2020-07-01 RX ORDER — 0.9 % SODIUM CHLORIDE 0.9 %
1000 INTRAVENOUS SOLUTION INTRAVENOUS ONCE
Status: COMPLETED | OUTPATIENT
Start: 2020-07-01 | End: 2020-07-01

## 2020-07-01 RX ORDER — SODIUM CHLORIDE 0.9 % (FLUSH) 0.9 %
10 SYRINGE (ML) INJECTION PRN
Status: DISCONTINUED | OUTPATIENT
Start: 2020-07-01 | End: 2020-07-07 | Stop reason: HOSPADM

## 2020-07-01 RX ORDER — POTASSIUM CHLORIDE 7.45 MG/ML
40 INJECTION INTRAVENOUS ONCE
Status: COMPLETED | OUTPATIENT
Start: 2020-07-01 | End: 2020-07-01

## 2020-07-01 RX ORDER — ONDANSETRON 2 MG/ML
4 INJECTION INTRAMUSCULAR; INTRAVENOUS EVERY 6 HOURS PRN
Status: DISCONTINUED | OUTPATIENT
Start: 2020-07-01 | End: 2020-07-01

## 2020-07-01 RX ORDER — 0.9 % SODIUM CHLORIDE 0.9 %
20 INTRAVENOUS SOLUTION INTRAVENOUS ONCE
Status: COMPLETED | OUTPATIENT
Start: 2020-07-01 | End: 2020-07-02

## 2020-07-01 RX ORDER — SODIUM CHLORIDE 0.9 % (FLUSH) 0.9 %
10 SYRINGE (ML) INJECTION EVERY 12 HOURS SCHEDULED
Status: DISCONTINUED | OUTPATIENT
Start: 2020-07-01 | End: 2020-07-01 | Stop reason: SDUPTHER

## 2020-07-01 RX ORDER — LACTULOSE 10 G/15ML
20 SOLUTION ORAL ONCE
Status: COMPLETED | OUTPATIENT
Start: 2020-07-01 | End: 2020-07-01

## 2020-07-01 RX ORDER — SODIUM CHLORIDE 0.9 % (FLUSH) 0.9 %
10 SYRINGE (ML) INJECTION EVERY 12 HOURS SCHEDULED
Status: DISCONTINUED | OUTPATIENT
Start: 2020-07-01 | End: 2020-07-07 | Stop reason: HOSPADM

## 2020-07-01 RX ORDER — OCTREOTIDE ACETATE 50 UG/ML
50 INJECTION, SOLUTION INTRAVENOUS; SUBCUTANEOUS ONCE
Status: COMPLETED | OUTPATIENT
Start: 2020-07-01 | End: 2020-07-01

## 2020-07-01 RX ORDER — POTASSIUM CHLORIDE 7.45 MG/ML
10 INJECTION INTRAVENOUS
Status: COMPLETED | OUTPATIENT
Start: 2020-07-01 | End: 2020-07-01

## 2020-07-01 RX ORDER — SODIUM CHLORIDE 9 MG/ML
1000 INJECTION, SOLUTION INTRAVENOUS CONTINUOUS
Status: DISCONTINUED | OUTPATIENT
Start: 2020-07-01 | End: 2020-07-01

## 2020-07-01 RX ORDER — POTASSIUM CHLORIDE 20 MEQ/1
40 TABLET, EXTENDED RELEASE ORAL ONCE
Status: DISCONTINUED | OUTPATIENT
Start: 2020-07-01 | End: 2020-07-01

## 2020-07-01 RX ORDER — POLYETHYLENE GLYCOL 3350 17 G/17G
17 POWDER, FOR SOLUTION ORAL DAILY PRN
Status: DISCONTINUED | OUTPATIENT
Start: 2020-07-01 | End: 2020-07-07 | Stop reason: HOSPADM

## 2020-07-01 RX ORDER — SODIUM CHLORIDE 0.9 % (FLUSH) 0.9 %
10 SYRINGE (ML) INJECTION PRN
Status: DISCONTINUED | OUTPATIENT
Start: 2020-07-01 | End: 2020-07-01 | Stop reason: SDUPTHER

## 2020-07-01 RX ORDER — ACETAMINOPHEN 325 MG/1
650 TABLET ORAL EVERY 6 HOURS PRN
Status: DISCONTINUED | OUTPATIENT
Start: 2020-07-01 | End: 2020-07-07 | Stop reason: HOSPADM

## 2020-07-01 RX ORDER — SODIUM CHLORIDE 0.9 % (FLUSH) 0.9 %
10 SYRINGE (ML) INJECTION PRN
Status: CANCELLED | OUTPATIENT
Start: 2020-07-01

## 2020-07-01 RX ORDER — ACETAMINOPHEN 650 MG/1
650 SUPPOSITORY RECTAL EVERY 6 HOURS PRN
Status: DISCONTINUED | OUTPATIENT
Start: 2020-07-01 | End: 2020-07-07 | Stop reason: HOSPADM

## 2020-07-01 RX ORDER — PROCHLORPERAZINE EDISYLATE 5 MG/ML
10 INJECTION INTRAMUSCULAR; INTRAVENOUS EVERY 6 HOURS PRN
Status: DISCONTINUED | OUTPATIENT
Start: 2020-07-01 | End: 2020-07-07 | Stop reason: HOSPADM

## 2020-07-01 RX ORDER — PROMETHAZINE HYDROCHLORIDE 25 MG/1
12.5 TABLET ORAL EVERY 6 HOURS PRN
Status: DISCONTINUED | OUTPATIENT
Start: 2020-07-01 | End: 2020-07-01

## 2020-07-01 RX ORDER — THIAMINE HYDROCHLORIDE 100 MG/ML
100 INJECTION, SOLUTION INTRAMUSCULAR; INTRAVENOUS ONCE
Status: COMPLETED | OUTPATIENT
Start: 2020-07-01 | End: 2020-07-01

## 2020-07-01 RX ORDER — ASPIRIN 81 MG/1
324 TABLET, CHEWABLE ORAL ONCE
Status: DISCONTINUED | OUTPATIENT
Start: 2020-07-01 | End: 2020-07-01

## 2020-07-01 RX ORDER — SODIUM CHLORIDE 0.9 % (FLUSH) 0.9 %
10 SYRINGE (ML) INJECTION EVERY 12 HOURS SCHEDULED
Status: CANCELLED | OUTPATIENT
Start: 2020-07-01

## 2020-07-01 RX ADMIN — Medication 10 ML: at 09:31

## 2020-07-01 RX ADMIN — SODIUM CHLORIDE 1000 ML: 9 INJECTION, SOLUTION INTRAVENOUS at 08:37

## 2020-07-01 RX ADMIN — POTASSIUM CHLORIDE 10 MEQ: 7.46 INJECTION, SOLUTION INTRAVENOUS at 07:34

## 2020-07-01 RX ADMIN — WATER 1 G: 1 INJECTION INTRAMUSCULAR; INTRAVENOUS; SUBCUTANEOUS at 02:08

## 2020-07-01 RX ADMIN — SODIUM CHLORIDE 20 ML: 9 INJECTION, SOLUTION INTRAVENOUS at 23:15

## 2020-07-01 RX ADMIN — SODIUM CHLORIDE 1000 ML: 9 INJECTION, SOLUTION INTRAVENOUS at 02:08

## 2020-07-01 RX ADMIN — LACTULOSE 20 G: 20 SOLUTION ORAL at 05:58

## 2020-07-01 RX ADMIN — PANTOPRAZOLE SODIUM 80 MG: 40 INJECTION, POWDER, FOR SOLUTION INTRAVENOUS at 02:09

## 2020-07-01 RX ADMIN — Medication 10 ML: at 20:09

## 2020-07-01 RX ADMIN — OCTREOTIDE ACETATE 50 MCG/HR: 500 INJECTION, SOLUTION INTRAVENOUS; SUBCUTANEOUS at 20:10

## 2020-07-01 RX ADMIN — RIFAXIMIN 200 MG: 200 TABLET ORAL at 20:09

## 2020-07-01 RX ADMIN — POTASSIUM CHLORIDE 10 MEQ: 7.46 INJECTION, SOLUTION INTRAVENOUS at 12:12

## 2020-07-01 RX ADMIN — SODIUM CHLORIDE 8 MG/HR: 9 INJECTION, SOLUTION INTRAVENOUS at 18:08

## 2020-07-01 RX ADMIN — POTASSIUM CHLORIDE 10 MEQ: 7.46 INJECTION, SOLUTION INTRAVENOUS at 06:26

## 2020-07-01 RX ADMIN — SODIUM CHLORIDE 8 MG/HR: 9 INJECTION, SOLUTION INTRAVENOUS at 08:39

## 2020-07-01 RX ADMIN — THIAMINE HYDROCHLORIDE 100 MG: 100 INJECTION, SOLUTION INTRAMUSCULAR; INTRAVENOUS at 05:00

## 2020-07-01 RX ADMIN — SODIUM BICARBONATE: 84 INJECTION, SOLUTION INTRAVENOUS at 13:41

## 2020-07-01 RX ADMIN — POTASSIUM CHLORIDE 10 MEQ: 7.46 INJECTION, SOLUTION INTRAVENOUS at 08:43

## 2020-07-01 RX ADMIN — POTASSIUM CHLORIDE 10 MEQ: 7.46 INJECTION, SOLUTION INTRAVENOUS at 13:19

## 2020-07-01 RX ADMIN — OCTREOTIDE ACETATE 50 MCG/HR: 500 INJECTION, SOLUTION INTRAVENOUS; SUBCUTANEOUS at 02:49

## 2020-07-01 RX ADMIN — POTASSIUM CHLORIDE 10 MEQ: 7.46 INJECTION, SOLUTION INTRAVENOUS at 11:09

## 2020-07-01 RX ADMIN — SODIUM CHLORIDE 1000 ML: 9 INJECTION, SOLUTION INTRAVENOUS at 03:30

## 2020-07-01 RX ADMIN — THIAMINE HYDROCHLORIDE: 100 INJECTION, SOLUTION INTRAMUSCULAR; INTRAVENOUS at 09:29

## 2020-07-01 RX ADMIN — OCTREOTIDE ACETATE 50 MCG: 50 INJECTION, SOLUTION INTRAVENOUS; SUBCUTANEOUS at 02:45

## 2020-07-01 RX ADMIN — POTASSIUM CHLORIDE 10 MEQ: 7.46 INJECTION, SOLUTION INTRAVENOUS at 10:15

## 2020-07-01 RX ADMIN — OCTREOTIDE ACETATE 50 MCG/HR: 500 INJECTION, SOLUTION INTRAVENOUS; SUBCUTANEOUS at 08:38

## 2020-07-01 RX ADMIN — POTASSIUM CHLORIDE 10 MEQ: 7.46 INJECTION, SOLUTION INTRAVENOUS at 05:15

## 2020-07-01 ASSESSMENT — ENCOUNTER SYMPTOMS
VOMITING: 1
BLOOD IN STOOL: 1
WHEEZING: 0
DIARRHEA: 1
SHORTNESS OF BREATH: 0
NAUSEA: 1

## 2020-07-01 ASSESSMENT — PAIN SCALES - GENERAL
PAINLEVEL_OUTOF10: 0

## 2020-07-01 NOTE — ED PROVIDER NOTES
Patient is a 50-year-old female with a history of chronic anemia, alcohol abuse that presents to the emergency department for evaluation of generalized weakness and fatigue. Per patient's sister-in-law the patient is a known alcoholic however over the last several months is not been drinking as much. She states that over the last 2 days the patient has become more confused and altered and had generalized weakness. Symptoms have been gradual in onset and severe. Nothing seems make it better or worse. Patient is never had this happen before. Sister-in-law denies any history of cirrhosis of the liver. She denies any infectious type symptoms such as cough, congestion, fever, chills, nausea, vomiting. Patient is lethargic and confused and unable to provide further history. The history is provided by the patient. Extremity Weakness   Severity:  Severe  Onset quality:  Gradual  Duration:  2 days  Timing:  Constant  Progression:  Worsening  Chronicity:  New  Context: alcohol use    Relieved by:  Nothing  Worsened by:  Nothing  Ineffective treatments:  None tried       Review of Systems   Unable to perform ROS: Mental status change        Physical Exam  Vitals signs and nursing note reviewed. Constitutional:       General: She is not in acute distress. Appearance: She is underweight. She is ill-appearing. She is not diaphoretic. HENT:      Head: Normocephalic and atraumatic. Mouth/Throat:      Mouth: Mucous membranes are moist.   Eyes:      Extraocular Movements: Extraocular movements intact. Pupils: Pupils are equal, round, and reactive to light. Neck:      Musculoskeletal: No muscular tenderness. Cardiovascular:      Rate and Rhythm: Regular rhythm. Tachycardia present. Pulses: Normal pulses. Heart sounds: Normal heart sounds. No murmur. Pulmonary:      Effort: Pulmonary effort is normal. No respiratory distress. Breath sounds: Normal breath sounds.  No wheezing or rhonchi. Abdominal:      Palpations: Abdomen is soft. Tenderness: There is no abdominal tenderness. There is no guarding or rebound. Genitourinary:     Rectum: Guaiac result positive. Musculoskeletal:         General: No swelling or tenderness. Lymphadenopathy:      Cervical: No cervical adenopathy. Skin:     General: Skin is warm and dry. Neurological:      Mental Status: She is oriented to person, place, and time and easily aroused. She is lethargic. Cranial Nerves: No cranial nerve deficit. Sensory: No sensory deficit. Motor: Weakness (Generalized weakness) present. Procedures     MDM  Number of Diagnoses or Management Options  Acute renal failure, unspecified acute renal failure type [facility]):   Hepatic cirrhosis, unspecified hepatic cirrhosis type, unspecified whether ascites present [facility]):   Hepatic encephalopathy ([facility] [address]): Hypokalemia:   Upper GI bleed:   Diagnosis management comments: Patient is a 67-year-old female who presents the emergency department for evaluation of generalized weakness, altered mental status. Patient very lethargic though easily awoken on presentation. She is oriented x3 however remainder questions and answers are confused. Patient hemodynamically stable at this time. Differential diagnosis includes but is not limited to alcohol intoxication, hepatic encephalopathy, cirrhosis of the liver, peritonitis, pneumonia, urinary tract infection, drug overdose. Patient blood work remarkable for elevated white count of 12, creatinine of 3.0, BUN of 130, bilirubin of 3.3, elevated liver enzymes consistent with cirrhosis of the liver patient's history of alcohol abuse. Patient given multiple fluid boluses and started on maintenance fluid for suspected dehydration. CT scan of the abdomen and pelvis showed irregular liver with fatty infiltration consistent with cirrhosis of the liver.   Patient began having black tarry-looking bowel movements concerning for Granulocytes % 1.7 0.0 - 5.0 %    Lymphocytes % 3.6 (L) 20.0 - 42.0 %    Monocytes % 10.5 2.0 - 12.0 %    Eosinophils % 0.5 0.0 - 6.0 %    Basophils % 0.3 0.0 - 2.0 %    Neutrophils Absolute 10.58 (H) 1.80 - 7.30 E9/L    Immature Granulocytes # 0.21 E9/L    Lymphocytes Absolute 0.46 (L) 1.50 - 4.00 E9/L    Monocytes Absolute 1.33 (H) 0.10 - 0.95 E9/L    Eosinophils Absolute 0.06 0.05 - 0.50 E9/L    Basophils Absolute 0.04 0.00 - 0.20 E9/L    Anisocytosis 1+     Polychromasia 1+     Pappenheimer Bodies 1+    Comprehensive Metabolic Panel w/ Reflex to MG   Result Value Ref Range    Sodium 134 132 - 146 mmol/L    Potassium reflex Magnesium 3.1 (L) 3.5 - 5.0 mmol/L    Chloride 88 (L) 98 - 107 mmol/L    CO2 14 (L) 22 - 29 mmol/L    Anion Gap 32 (H) 7 - 16 mmol/L    Glucose 118 (H) 74 - 99 mg/dL     (H) 6 - 20 mg/dL    CREATININE 3.3 (H) 0.5 - 1.0 mg/dL    GFR Non-African American 15 >=60 mL/min/1.73    GFR African American 18     Calcium 7.7 (L) 8.6 - 10.2 mg/dL    Total Protein 7.7 6.4 - 8.3 g/dL    Alb 3.3 (L) 3.5 - 5.2 g/dL    Total Bilirubin 3.3 (H) 0.0 - 1.2 mg/dL    Alkaline Phosphatase 150 (H) 35 - 104 U/L     (H) 0 - 32 U/L    AST 1,727 (H) 0 - 31 U/L   Urinalysis   Result Value Ref Range    Color, UA Yellow Straw/Yellow    Clarity, UA SL CLOUDY Clear    Glucose, Ur Negative Negative mg/dL    Bilirubin Urine SMALL (A) Negative    Ketones, Urine TRACE (A) Negative mg/dL    Specific Gravity, UA 1.015 1.005 - 1.030    Blood, Urine LARGE (A) Negative    pH, UA 6.0 5.0 - 9.0    Protein, UA 30 (A) Negative mg/dL    Urobilinogen, Urine 0.2 <2.0 E.U./dL    Nitrite, Urine Negative Negative    Leukocyte Esterase, Urine LARGE (A) Negative   Serum Drug Screen   Result Value Ref Range    Ethanol Lvl <10 mg/dL    Acetaminophen Level <5.0 (L) 10.0 - 99.9 mcg/mL    Salicylate, Serum <2.9 0.0 - 30.0 mg/dL   Urine Drug Screen   Result Value Ref Range    Amphetamine Screen, Urine NOT DETECTED Negative <1000 ng/mL Barbiturate Screen, Ur NOT DETECTED Negative < 200 ng/mL    Benzodiazepine Screen, Urine NOT DETECTED Negative < 200 ng/mL    Cannabinoid Scrn, Ur NOT DETECTED Negative < 50ng/mL    Cocaine Metabolite Screen, Urine NOT DETECTED Negative < 300 ng/mL    Opiate Scrn, Ur NOT DETECTED Negative < 300ng/mL    PCP Screen, Urine NOT DETECTED Negative < 25 ng/mL    Methadone Screen, Urine NOT DETECTED Negative <300 ng/mL    Oxycodone Urine NOT DETECTED Negative <100 ng/mL    FENTANYL SCREEN, URINE NOT DETECTED Negative <1 ng/mL    Drug Screen Comment: see below    Ammonia   Result Value Ref Range    Ammonia 136.0 (H) 11.0 - 51.0 umol/L   Hepatic Function Panel   Result Value Ref Range    Bilirubin, Direct 2.6 (H) 0.0 - 0.3 mg/dL    Bilirubin, Indirect 0.7 0.0 - 1.0 mg/dL   Troponin   Result Value Ref Range    Troponin 0.15 (H) 0.00 - 0.03 ng/mL   Magnesium   Result Value Ref Range    Magnesium 2.8 (H) 1.6 - 2.6 mg/dL   Lactic Acid, Plasma   Result Value Ref Range    Lactic Acid 1.3 0.5 - 2.2 mmol/L   Blood Gas, Arterial   Result Value Ref Range    Date Analyzed 20200701     Time Analyzed 0132     Source: Blood Arterial     pH, Blood Gas 7.434 7.350 - 7.450    PCO2 18.1 (LL) 35.0 - 45.0 mmHg    PO2 129.7 (H) 75.0 - 100.0 mmHg    HCO3 11.9 (L) 22.0 - 26.0 mmol/L    B.E. -10.4 (L) -3.0 - 3.0 mmol/L    O2 Sat 98.1 92.0 - 98.5 %    O2Hb 97.5 (H) 94.0 - 97.0 %    COHb 0.3 0.0 - 1.5 %    MetHb 0.3 0.0 - 1.5 %    O2 Content 14.8 mL/dL    HHb 1.9 0.0 - 5.0 %    tHb (est) 10.6 (L) 11.5 - 16.5 g/dL    Mode RA     Date Of Collection      Time Collected      Pt Temp 37.0 C     ID [id number]     Lab [id number]     Critical(s) Notified Handed report to Dr/MANUELA    Protime-INR   Result Value Ref Range    Protime 17.1 (H) 9.3 - 12.4 sec    INR 1.4    APTT   Result Value Ref Range    aPTT 31.5 24.5 - 35.1 sec   Microscopic Urinalysis   Result Value Ref Range    WBC, UA 10-20 (A) 0 - 5 /HPF    RBC, UA 2-5 0 - 2 /HPF    Bacteria, UA MANY (A) None Seen /HPF   POCT Glucose   Result Value Ref Range    Glucose 109 mg/dL    QC OK? ok    POCT Glucose   Result Value Ref Range    Meter Glucose 109 (H) 74 - 99 mg/dL   EKG 12 Lead   Result Value Ref Range    Ventricular Rate 103 BPM    Atrial Rate 103 BPM    P-R Interval 148 ms    QRS Duration 86 ms    Q-T Interval 392 ms    QTc Calculation (Bazett) 513 ms    P Axis 43 degrees    R Axis 41 degrees    T Axis 41 degrees   TYPE AND SCREEN   Result Value Ref Range    ABO/Rh A POS     Antibody Screen NEG        Radiology  CT ABDOMEN PELVIS WO CONTRAST Additional Contrast? None   Final Result   1. Uneven fat infiltration of the liver. No dilatation of the biliary   tree and pancreatic ductal system. 2. Presence of a gallstones. Development of calcification adherent to   the inner wall of the gallbladder towards the fundal region, it can be   an indication of chronic cholecystitis with developing porcelain   gallbladder. Final interpretation can require correlation with   histopathology. 3. No acute inflammatory changes in the omental mesenteric fat planes   free intraperitoneal air ascites. No indication for bowel obstruction. 4. No obstructive uropathy. Uneven fat infiltration of the liver. CT Head WO Contrast   Final Result      No evidence of acute intracranial hemorrhage or edema. XR CHEST PORTABLE   Final Result      No airspace opacities or pleural effusion. EKG:  This EKG is signed and interpreted by me. Rate: 103  Rhythm: Sinus  Interpretation: sinus tachycardia  Comparison: changes compared to previous EKG      ------------------------- NURSING NOTES AND VITALS REVIEWED ---------------------------  Date / Time Roomed:  6/30/2020 10:34 PM  ED Bed Assignment:  08/08    The nursing notes within the ED encounter and vital signs as below have been reviewed.    Patient Vitals for the past 24 hrs:   BP Temp Temp src Pulse Resp SpO2 Height Weight   07/01/20 0525 133/89 -- -- 112 -- -- -- --   07/01/20 0459 116/85 -- -- 109 14 98 % -- --   07/01/20 0430 108/67 -- -- 111 -- -- -- --   07/01/20 0400 98/71 -- -- 116 -- -- -- --   07/01/20 0252 (!) 140/68 -- -- 101 -- -- -- --   07/01/20 0223 122/61 -- -- 114 -- -- -- --   07/01/20 0152 113/81 -- -- 115 -- 100 % -- --   07/01/20 0122 120/72 -- -- 110 -- -- -- --   06/30/20 2203 -- -- -- -- -- 100 % -- --   06/30/20 2156 128/74 98.1 °F (36.7 °C) Oral 107 14 -- 5' (1.524 m) 106 lb (48.1 kg)   06/30/20 2149 -- 97.7 °F (36.5 °C) Infrared -- -- -- -- --       Oxygen Saturation Interpretation: Normal      ------------------------------------------ PROGRESS NOTES ------------------------------------------  Re-evaluation(s):  Time: 0000. Patients symptoms show no change  Repeat physical examination is not changed    Time: 0200. Patients symptoms show no change  Repeat physical examination is not changed    I have spoken with the patient and discussed todays results, in addition to providing specific details for the plan of care and counseling regarding the diagnosis and prognosis. Their questions are answered at this time and they are agreeable with the plan.      --------------------------------- ADDITIONAL PROVIDER NOTES ---------------------------------  Consultations:  Spoke with Dr. Deion Robert,  They will admit this patient. Spoke with Dr. Helga Aleman,  They will provide consultation. This patient's ED course included: a personal history and physicial examination, re-evaluation prior to disposition, multiple bedside re-evaluations, IV medications, cardiac monitoring and continuous pulse oximetry    This patient has remained hemodynamically stable and been closely monitored during their ED course. Please note that the withdrawal or failure to initiate urgent interventions for this patient would likely result in a life threatening deterioration or permanent disability.       Accordingly this patient received 30 minutes of

## 2020-07-01 NOTE — ED NOTES
Attempted to place pt on bedpan, noted that she had dark, melena appearing stool.  Hemoccult positive and  notified     Hector White RN  75/25/14 [id number]

## 2020-07-01 NOTE — PATIENT CARE CONFERENCE
Intensive Care Daily Quality Rounding Checklist      ICU Team Members: Dr. Curt Pavon, Dr. Samson Tomlinson (resident), clinical pharmacist, respiratory therapist, charge nurse, and bedside nurse    ICU Day #: NUMBER: 1    Intubation Date: N/A    Ventilator Day #: N/A    Central Line Insertion Date: N/A        Day #: N/A     Arterial Line Insertion Date: N/A      Day #: N/A    DVT Prophylaxis: PCDs    GI Prophylaxis: Protonix drip    Pro Catheter Insertion Date: N/A       Day #: N/A      Continued need (if yes, reason documented and discussed with physician): N/A    Skin Issues/ Wounds and ordered treatment discussed on rounds: no issues-SOS precautions    Goals/ Plans for the Day: Daily labs, serial H&H, consult GI, continue Protonix and Octreotide drips

## 2020-07-01 NOTE — ED NOTES
Pt cleaned for 2nd bm of dark stool with small amount of clots,  notified     Kofi Park, RN  57/87/36 [id number]

## 2020-07-01 NOTE — CONSULTS
Critical Care Admit/Consult Note         Patient Shelley Mata   MRN -  [id number]   Acct # - [de-identified]   - 1965      Date of Admission -  2020 10:34 PM  Date of evaluation -  2020  [address] Day - 0            ADMIT/CONSULT DETAILS     Reason for Admit/Consult   Hepatic Encephalopathy     Consulting Kassie Preciado MD  Primary Care Physician - Ebony Quinn MD         [facility]   The patient is a 48 y.o. female with significant past medical history of alcohol abuse and chronic anemia who presents with a chief complaint of altered mental status. Patient was evaluated the emergency department and found to be in hepatic encephalopathy with an elevated ammonia level, markedly elevated liver enzymes, leukocytosis and a urinary tract infection. Past Medical History   No past medical history on file.      Past Surgical History           Procedure Laterality Date    UPPER GASTROINTESTINAL ENDOSCOPY N/A 10/21/2019    EGD BIOPSY performed by Melony Nelson MD at [facility] ENDOSCOPY         Current Medications   Current Medications    sodium chloride flush  10 mL Intravenous 2 times per day    potassium chloride  10 mEq Intravenous Q1H     sodium chloride flush  IV Drips/Infusions   sodium chloride 1,000 mL (20 0330)    octreotide (SANDOSTATIN) infusion 50 mcg/hr (20 0249)     Home Medications  Medications Prior to Admission: sertraline (ZOLOFT) 100 MG tablet, Take 1.5 tablets by mouth daily  omeprazole (PRILOSEC) 40 MG delayed release capsule, Take 1 capsule by mouth every morning (before breakfast)  spironolactone (ALDACTONE) 25 MG tablet, Take 1 tablet by mouth daily  furosemide (LASIX) 20 MG tablet, Take 1 tablet by mouth daily  ferrous sulfate (CHESTER-AMANDA) 325 (65 Fe) MG tablet, Take 1 tablet by mouth daily (with breakfast)    Diet/Nutrition   Diet NPO Effective Now    Allergies   Pcn [penicillins]    Social History   Tobacco reports that she has quit smoking. She has never used smokeless tobacco.    Alcohol     reports current alcohol use of about 5.0 standard drinks of alcohol per week. Occupational history :    Family History   No family history on file. Sleep History   n/a    ROS     REVIEW OF SYSTEMS:  Review of systems not obtained due to patient factors - mental status    Lines and Devices    PIV x2  Pro Catheter    Mechanical Ventilation Data   VENT SETTINGS (Comprehensive)  Vent Information  SpO2: 100 %  Additional Respiratory  Assessments  Pulse: 114  Resp: 30  SpO2: 100 %    ABG  Lab Results   Component Value Date    PH 7.434 2020    PCO2 18.1 2020    PO2 129.7 2020    HCO3 11.9 2020    O2SAT 98.1 2020     Lab Results   Component Value Date    MODE RA 2020           Vitals    height is 5' (1.524 m) and weight is 106 lb (48.1 kg). Her axillary temperature is 97.6 °F (36.4 °C). Her blood pressure is 111/80 and her pulse is 114. Her respiration is 30 and oxygen saturation is 100%.        Temperature Range: Temp: 97.6 °F (36.4 °C) Temp  Av.8 °F (36.6 °C)  Min: 97.6 °F (36.4 °C)  Max: 98.1 °F (36.7 °C)  BP Range:  Systolic (74JGF), SMI:186 , Min:98 , NCZ:732     Diastolic (43FBD), DJT:25, Min:61, Max:89    Pulse Range: Pulse  Av.6  Min: 101  Max: 116  Respiration Range: Resp  Av.6  Min: 14  Max: 30  Current Pulse Ox[de-identified]  SpO2: 100 %  24HR Pulse Ox Range:  SpO2  Av.7 %  Min: 98 %  Max: 100 %  Oxygen Amount and Delivery:        I/O (24 Hours)    Patient Vitals for the past 8 hrs:   BP Temp Temp src Pulse Resp SpO2   20 0617 111/80 -- -- 114 30 100 %   20 0607 116/74 -- -- 116 -- --   20 0600 116/74 -- -- 114 16 100 %   20 0558 116/74 97.6 °F (36.4 °C) Axillary 112 14 100 %   20 0525 133/89 -- -- 112 -- --   20 0459 116/85 -- -- 109 14 98 %   20 0430 108/67 -- -- 111 -- --   20 0400 98/71 -- -- 116 -- --   20 0252 (!) 140/68 -- -- 101 -- --   07/01/20 0223 122/61 -- -- 114 -- --   07/01/20 0152 113/81 -- -- 115 -- 100 %   07/01/20 0122 120/72 -- -- 110 -- --     No intake or output data in the 24 hours ending 07/01/20 0715  No intake/output data recorded. Patient Vitals for the past 96 hrs (Last 3 readings):   Weight   06/30/20 2156 106 lb (48.1 kg)         Drains/Tubes Outputs  Pro Catheter  Exam         PHYSICAL EXAM:  CONSTITUTIONAL:  Somnolent, in no acute distress  EYES:  Lids and lashes normal, pupils equal, round and reactive to light, extra ocular muscles intact, sclera clear, conjunctiva normal  ENT:  Normocephalic, without obvious abnormality, atraumatic, sinuses nontender on palpation, external ears without lesions, oral pharynx with moist mucus membranes, tonsils without erythema or exudates, gums normal and good dentition. NECK:  Supple, symmetrical, trachea midline, no adenopathy, thyroid symmetric, not enlarged and no tenderness, skin normal  HEMATOLOGIC/LYMPHATICS:  no cervical lymphadenopathy  BACK:  Symmetric, no curvature, spinous processes are non-tender on palpation, paraspinous muscles are non-tender on palpation, no costal vertebral tenderness  LUNGS:  No increased work of breathing, good air exchange, clear to auscultation bilaterally, no crackles or wheezing  CARDIOVASCULAR:  Normal apical impulse, regular rate and rhythm, normal S1 and S2, no S3 or S4, and no murmur noted  ABDOMEN:  Multiple loose maroon bowel movements. Abdomen otherwise soft and nontender  CHEST/BREASTS:  Breasts symmetrical, skin without lesion(s), no nipple retraction or dimpling, no nipple discharge, no masses palpated, no axillary or supraclavicular adenopathy  MUSCULOSKELETAL:  There is no redness, warmth, or swelling of the joints. Full range of motion noted. Motor strength is 5 out of 5 all extremities bilaterally.   Tone is normal.  NEUROLOGIC:  Lethargic, somnolent, A&Ox1  SKIN:  jaundice noted    Data   Old records and images have been reviewed    Lab Results   CBC     Lab Results   Component Value Date    WBC 12.7 2020    RBC 3.13 2020    HGB 12.4 2020    HCT 35.5 2020     2020    .4 2020    MCH 39.6 2020    MCHC 34.9 2020    RDW 15.5 2020    NRBC 0.9 2019    LYMPHOPCT 3.6 2020    MONOPCT 10.5 2020    MYELOPCT 2.6 2017    BASOPCT 0.3 2020    MONOSABS 1.33 2020    LYMPHSABS 0.46 2020    EOSABS 0.06 2020    BASOSABS 0.04 2020       BMP   Lab Results   Component Value Date     2020    K 3.1 2020    CL 88 2020    CO2 14 2020     2020    CREATININE 3.3 2020    GLUCOSE 109 2020    CALCIUM 7.7 2020       LFTS  Lab Results   Component Value Date    ALKPHOS 150 2020     2020    AST 1,727 2020    PROT 7.7 2020    BILITOT 3.3 2020    BILIDIR 2.6 2020    IBILI 0.7 2020    LABALBU 3.3 2020       INR  Recent Labs     20  0327   PROTIME 17.1*   INR 1.4       APTT  Recent Labs     20  0327   APTT 31.5       Lactic Acid  Lab Results   Component Value Date    LACTA 1.3 2020    LACTA 2.3 2017    LACTA 3.2 2017        BNP   No results for input(s): BNP in the last 72 hours. Cultures     No results for input(s): BC in the last 72 hours. No results for input(s): Charl Delay in the last 72 hours. No results for input(s): LABURIN in the last 72 hours. Radiology   Ct Abdomen Pelvis Wo Contrast Additional Contrast? None    Result Date: 2020  Patient MRN:  [id number] : 1965 Age: 48 years Gender: Female Order Date:  2020 12:00 AM TECHNIQUE/NUMBER OF IMAGES/COMPARISON/CLINICAL HISTORY: CT abdomen and pelvis Axial images obtained sagittal and coronal reconstructions 339 images. Comparison 2019. Elevated liver enzymes abdominal pain.  FINDINGS: Presence Uneven fat infiltration of the liver. Ct Head Wo Contrast    Result Date: 2020  Patient MRN:  [id number] : 1965 Age: 48 years Gender: Female Order Date:  2020 11:15 PM EXAM: CT HEAD WO CONTRAST COMPARISON: 2017 INDICATION:  altered mental status altered mental status TECHNIQUE: Axial unenhanced CT scanning was performed through the head without the use of intravenous contrast. Low-dose CT acquisition technique included one of the following options; 1. Automated exposure control, 2. Adjustment of mA and/or kV according to the patient's size or 3. Use of iterative reconstruction. FINDINGS: No evidence of acute intracranial hemorrhage or edema. Ventricles are nonenlarged. No abnormal extra-axial fluid collections. Mastoid air cells are clear. No evidence of acute intracranial hemorrhage or edema. Xr Chest Portable    Result Date: 2020  Patient MRN:  [id number] : 1965 Age: 48 years Gender: Female Order Date:  2020 11:15 PM EXAM: XR CHEST PORTABLE COMPARISON: 2017 INDICATION:  altered mental status altered mental status FINDINGS: The heart is normal in size. No focal airspace opacity. There is no pleural effusion. There is no pneumothorax. No free air beneath the diaphragms. No airspace opacities or pleural effusion.        SYSTEMS ASSESSMENT    Neuro   Altered 2/2 hepatic encephalopathy  Lactulose MS  Avoid sedating agents    Respiratory   Nothing to add  Keep O2 sat 90-92%    Cardiovascular   No acute issues  Continue to monitor    Gastrointestinal   Hepatic encephalopathy  GI Bleed, likely ulcerative  Dr. Hua Santana of gastroenterology following  Lactulose for ammonia excretion  Rifaximin for anti-diarrheal  Octreotide, protonix for GI bleed  Diet NPO  Consult to dietician    Renal   Replete electrolytes as necessary  Avoid nephrotoxic agents  AM CMP     Infectious Disease   No overt infectious processes at this time  AM CBC    Hematology/Oncology Chronic alcohol use  B12, thiamine, Folate replenishment    Endocrine   Nothing to add    Social/Spiritual/DNR/Other   Full Code    MECRED    \"Thank you for asking us to see this complex patient. \"

## 2020-07-01 NOTE — H&P
Ronal Jose  6/30/2020 10:34 PM     1965      48 y.o. Chief Complaint   Patient presents with    Extremity Weakness     Pt has progressively worsening weakness since this weekend. Hx of anemia. Denies recent obvious bleeding. History of Present Illness:    Ronal Jose is a 48 y. o. with hx etoh abuse, seen initially oct 2019 with GI bleed. . Had scope upper GI showed gastric and duodenal ulcer. CT abd + ascites but no note of cirrhosis although thought to be the case. She stopped drinking and all labs reverted to normal since then - last checked in March. Now brought in with increased lethargy, obtunded extremely weak. Melanotic stools. She denies drinking and  states he doesn't know if she's been drinking. Labs show ammonia 130,  Bun/cr 130/3. 3. hgb 8.0, K+ 3.1. markedly elevated lft's  Bili 3.3 alb 3.3. Anion gap 32. Urine + blood, protein, LE. Alcohol level zero , drug screen normal    Admitted to ICU for metabolic acidosis, hepatic encephalopathy, GI bleed. Rocephin, lactulose, replace K+ Consult Dr Venkat Gonsalez who has seen her in the past.    Prior to Admission medications    Medication Sig Start Date End Date Taking?  Authorizing Provider   sertraline (ZOLOFT) 100 MG tablet Take 1.5 tablets by mouth daily 5/4/20   Fuad Briones MD   omeprazole (PRILOSEC) 40 MG delayed release capsule Take 1 capsule by mouth every morning (before breakfast) 10/22/19   Fuad Briones MD   spironolactone (ALDACTONE) 25 MG tablet Take 1 tablet by mouth daily 10/22/19   Fuad Briones MD   furosemide (LASIX) 20 MG tablet Take 1 tablet by mouth daily 10/22/19   Fuad Briones MD   ferrous sulfate (CHESTER-AMANDA) 325 (65 Fe) MG tablet Take 1 tablet by mouth daily (with breakfast) 10/22/19   Fuad Briones MD       Current Facility-Administered Medications   Medication Dose Route Frequency Provider Last Rate Last Dose    0.9 % sodium chloride infusion  1,000 mL Intravenous Continuous Ligia Lobe, DO 75 mL/hr at 07/01/20 0330 1,000 mL at 07/01/20 0330    octreotide (SANDOSTATIN) 500 mcg in sodium chloride 0.9 % 100 mL infusion  50 mcg/hr Intravenous Continuous Gardenia Reilly., DO 10 mL/hr at 07/01/20 0249 50 mcg/hr at 07/01/20 0249    sodium chloride flush 0.9 % injection 10 mL  10 mL Intravenous 2 times per day Duke Pratt MD        sodium chloride flush 0.9 % injection 10 mL  10 mL Intravenous PRN Duke Pratt MD        potassium chloride 10 mEq/100 mL IVPB (Peripheral Line)  10 mEq Intravenous Q1H Keyona Cain  mL/hr at 07/01/20 0734 10 mEq at 07/01/20 0734     Continuous Infusions:   sodium chloride 1,000 mL (07/01/20 0330)    octreotide (SANDOSTATIN) infusion 50 mcg/hr (07/01/20 0249)       Allergies   Allergen Reactions    Pcn [Penicillins]          No past medical history on file. Past Surgical History:   Procedure Laterality Date    UPPER GASTROINTESTINAL ENDOSCOPY N/A 10/21/2019    EGD BIOPSY performed by Lew Joshua MD at [place] [id number] History:   Social History     Socioeconomic History    Marital status:      Spouse name: Not on file    Number of children: Not on file    Years of education: Not on file    Highest education level: Not on file   Occupational History    Not on file   Social Needs    Financial resource strain: Not on file    Food insecurity     Worry: Not on file     Inability: Not on file    Transportation needs     Medical: Not on file     Non-medical: Not on file   Tobacco Use    Smoking status: Former Smoker    Smokeless tobacco: Never Used   Substance and Sexual Activity    Alcohol use:  Yes     Alcohol/week: 5.0 standard drinks     Types: 5 Standard drinks or equivalent per week     Comment: tequila daily    Drug use: No    Sexual activity: Not on file   Lifestyle    Physical activity     Days per week: Not on file     Minutes per session: Not on file    Stress: Not on file Relationships    Social connections     Talks on phone: Not on file     Gets together: Not on file     Attends Christian service: Not on file     Active member of club or organization: Not on file     Attends meetings of clubs or organizations: Not on file     Relationship status: Not on file    Intimate partner violence     Fear of current or ex partner: Not on file     Emotionally abused: Not on file     Physically abused: Not on file     Forced sexual activity: Not on file   Other Topics Concern    Not on file   Social History Narrative    Not on file       Family History:   No family history on file. REVIEW OF SYSTEMS:    Review of Systems   Constitutional: Positive for activity change, appetite change, fatigue and unexpected weight change. Negative for chills and fever. HENT: Negative. Respiratory: Negative for shortness of breath and wheezing. Cardiovascular: Negative for chest pain, palpitations and leg swelling. Gastrointestinal: Positive for blood in stool, diarrhea, nausea and vomiting. Genitourinary: Negative. Musculoskeletal: Negative. Skin: Negative. Neurological: Positive for weakness. Psychiatric/Behavioral: Positive for confusion. Negative for self-injury and suicidal ideas. DATA:      Recent Labs     06/30/20  2311   WBC 12.7*   HGB 12.4   HCT 35.5   .4*        Recent Labs     06/30/20  2311      K 3.1*   CL 88*   CO2 14*   *   CREATININE 3.3*   LABGLOM 15   CALCIUM 7.7*     Recent Labs     06/30/20  2311   *     INR:   Recent Labs     07/01/20  0327   INR 1.4     Invalid input(s): PT/INR    No results found for: CRP  No results found for: SEDRATE  No results for input(s): POCGLU in the last 72 hours. Lipids: No results for input(s): CHOL, HDL in the last 72 hours.     Invalid input(s): LDLCALCU  ABGs: No results found for: PHART, PO2ART, GZM1WNB    Last 3 Troponin:    Lab Results   Component Value Date    TROPONINI 0.15 06/30/2020    TROPONINI <0.01 12/23/2017     TSH:    Lab Results   Component Value Date    TSH 5.760 10/17/2019        No results for input(s): POCGLU in the last 72 hours. U/A:     Recent Labs     07/01/20  0036   COLORU Yellow   PHUR 6.0   WBCUA 10-20*   RBCUA 2-5   BACTERIA MANY*   CLARITYU SL CLOUDY   SPECGRAV 1.015   LEUKOCYTESUR LARGE*   UROBILINOGEN 0.2   BILIRUBINUR SMALL*   BLOODU LARGE*   GLUCOSEU Negative          Iron studies:  Lab Results   Component Value Date    FERRITIN 44 10/19/2019     Bone disease:  Lab Results   Component Value Date    MG 2.8 (H) 06/30/2020     Nutrition:No results found for: ALB    Wt Readings from Last 4 Encounters:   06/30/20 106 lb (48.1 kg)   10/20/19 100 lb (45.4 kg)   12/23/17 100 lb (45.4 kg)     Vitals:    07/01/20 0617   BP: 111/80   Pulse: 114   Resp: 30   Temp:    SpO2: 100%       Physical exam[de-identified]               General appearance - dehydrated, ill-appearing and oriented to person and place and year butnotdate  Mental status - inappropriate safety awareness  Eyes - pupils equal and reactive, extraocular eye movements intact  Neck - supple, no significant adenopathy  Chest - clear to auscultation, no wheezes, rales or rhonchi, symmetric air entry  Heart - normal rate, regular rhythm, normal S1, S2, no murmurs, rubs, clicks or gallops  Abdomen - soft, nontender, nondistended, no masses or organomegaly  Neurological - awake  Lethargic. Follows commands but slowly  Extremities - peripheral pulses normal, no pedal edema, no clubbing or cyanosis  Skin - normal coloration and turgor, no rashes, no suspicious skin lesions noted                         Imaging:      Assessment:  Patient Active Problem List   Diagnosis    Anemia    Iron deficiency anemia due to chronic blood loss    Anemia due to GI blood loss    Hepatic encephalopathy ([address]     1. Hepatic encephalopathy   Ammonia 130   On lactulose    2.. GI bleed   Melanotic stools   hgb 8    3.   Acute blood loss anemia    4. Macrocytosis    5. Alcoholic liver disease    6.  neri   Severe dehydration     7.   Metabolic acidosis   Anion gap 32   Low bicarb    8.  uti    Plan:     icu bed  Consult GI  Lactulose  Iv antibiotics    Refugia Sink  7:36 AM  7/1/2020

## 2020-07-01 NOTE — PATIENT CARE CONFERENCE
Patient admitted from ER to [facility], with the following belongings: pair of sandals, glasses, shirt, and pant. Placed on monitor, patient oriented to room and unit visiting hours. Patient guide at bedside, reviewed patient rights and responsibilities. MRSA nasal swab obtained. Bed alarm on. Call light within reach.

## 2020-07-01 NOTE — CARE COORDINATION
Pt only oriented x 2. Phone conversation w/  Carmelita Torres [phone]. Explained role of  and plan of care. Currently they are living in a hotel while their house is being built. Has [address] and cane. Hx outpt PT @ [facility]. No Hx ROWDY or ETOH rehab. PCP is Dr. Wallace Armenta and pharmacy is [facility].  Per , plan is to return home on discharge pending progress- will follow Sarah Moore

## 2020-07-02 LAB
ALBUMIN SERPL-MCNC: 2.4 G/DL (ref 3.5–5.2)
ALP BLD-CCNC: 91 U/L (ref 35–104)
ALT SERPL-CCNC: 253 U/L (ref 0–32)
AMMONIA: 25.7 UMOL/L (ref 11–51)
ANION GAP SERPL CALCULATED.3IONS-SCNC: 10 MMOL/L (ref 7–16)
ANION GAP SERPL CALCULATED.3IONS-SCNC: 9 MMOL/L (ref 7–16)
ANISOCYTOSIS: ABNORMAL
AST SERPL-CCNC: 748 U/L (ref 0–31)
BASOPHILS ABSOLUTE: 0 E9/L (ref 0–0.2)
BASOPHILS RELATIVE PERCENT: 0 % (ref 0–2)
BILIRUB SERPL-MCNC: 1.9 MG/DL (ref 0–1.2)
BUN BLDV-MCNC: 64 MG/DL (ref 6–20)
BUN BLDV-MCNC: 87 MG/DL (ref 6–20)
CALCIUM IONIZED: 1.07 MMOL/L (ref 1.15–1.33)
CALCIUM SERPL-MCNC: 7.3 MG/DL (ref 8.6–10.2)
CALCIUM SERPL-MCNC: 7.6 MG/DL (ref 8.6–10.2)
CHLORIDE BLD-SCNC: 104 MMOL/L (ref 98–107)
CHLORIDE BLD-SCNC: 113 MMOL/L (ref 98–107)
CO2: 25 MMOL/L (ref 22–29)
CO2: 25 MMOL/L (ref 22–29)
CREAT SERPL-MCNC: 1.3 MG/DL (ref 0.5–1)
CREAT SERPL-MCNC: 1.6 MG/DL (ref 0.5–1)
EOSINOPHILS ABSOLUTE: 0.19 E9/L (ref 0.05–0.5)
EOSINOPHILS RELATIVE PERCENT: 2.6 % (ref 0–6)
GFR AFRICAN AMERICAN: 41
GFR AFRICAN AMERICAN: 52
GFR NON-AFRICAN AMERICAN: 34 ML/MIN/1.73
GFR NON-AFRICAN AMERICAN: 43 ML/MIN/1.73
GLUCOSE BLD-MCNC: 170 MG/DL (ref 74–99)
GLUCOSE BLD-MCNC: 198 MG/DL (ref 74–99)
HCT VFR BLD CALC: 24.1 % (ref 34–48)
HCT VFR BLD CALC: 25.2 % (ref 34–48)
HCT VFR BLD CALC: 25.8 % (ref 34–48)
HCT VFR BLD CALC: 26.3 % (ref 34–48)
HEMOGLOBIN: 8.4 G/DL (ref 11.5–15.5)
HEMOGLOBIN: 8.8 G/DL (ref 11.5–15.5)
HEMOGLOBIN: 8.9 G/DL (ref 11.5–15.5)
HEMOGLOBIN: 9.1 G/DL (ref 11.5–15.5)
HYPOCHROMIA: ABNORMAL
LYMPHOCYTES ABSOLUTE: 0.79 E9/L (ref 1.5–4)
LYMPHOCYTES RELATIVE PERCENT: 11.2 % (ref 20–42)
MAGNESIUM: 1.8 MG/DL (ref 1.6–2.6)
MCH RBC QN AUTO: 36.6 PG (ref 26–35)
MCHC RBC AUTO-ENTMCNC: 34.5 % (ref 32–34.5)
MCV RBC AUTO: 106.2 FL (ref 80–99.9)
MONOCYTES ABSOLUTE: 1.15 E9/L (ref 0.1–0.95)
MONOCYTES RELATIVE PERCENT: 15.5 % (ref 2–12)
MRSA CULTURE ONLY: NORMAL
NEUTROPHILS ABSOLUTE: 5.11 E9/L (ref 1.8–7.3)
NEUTROPHILS RELATIVE PERCENT: 70.7 % (ref 43–80)
NUCLEATED RED BLOOD CELLS: 1.7 /100 WBC
PDW BLD-RTO: 19.2 FL (ref 11.5–15)
PHOSPHORUS: 2.9 MG/DL (ref 2.5–4.5)
PLATELET # BLD: 86 E9/L (ref 130–450)
PLATELET CONFIRMATION: NORMAL
PMV BLD AUTO: 9.8 FL (ref 7–12)
POTASSIUM SERPL-SCNC: 2.8 MMOL/L (ref 3.5–5)
POTASSIUM SERPL-SCNC: 3.1 MMOL/L (ref 3.5–5)
RBC # BLD: 2.43 E12/L (ref 3.5–5.5)
REASON FOR REJECTION: NORMAL
REJECTED TEST: NORMAL
SODIUM BLD-SCNC: 138 MMOL/L (ref 132–146)
SODIUM BLD-SCNC: 148 MMOL/L (ref 132–146)
TARGET CELLS: ABNORMAL
TOTAL CK: 5073 U/L (ref 20–180)
TOTAL PROTEIN: 5.5 G/DL (ref 6.4–8.3)
TROPONIN: 0.08 NG/ML (ref 0–0.03)
TROPONIN: 0.09 NG/ML (ref 0–0.03)
WBC # BLD: 7.2 E9/L (ref 4.5–11.5)

## 2020-07-02 PROCEDURE — C9113 INJ PANTOPRAZOLE SODIUM, VIA: HCPCS | Performed by: INTERNAL MEDICINE

## 2020-07-02 PROCEDURE — 6360000002 HC RX W HCPCS: Performed by: INTERNAL MEDICINE

## 2020-07-02 PROCEDURE — 2580000003 HC RX 258: Performed by: GENERAL PRACTICE

## 2020-07-02 PROCEDURE — 2580000003 HC RX 258: Performed by: INTERNAL MEDICINE

## 2020-07-02 PROCEDURE — 36415 COLL VENOUS BLD VENIPUNCTURE: CPT

## 2020-07-02 PROCEDURE — 84100 ASSAY OF PHOSPHORUS: CPT

## 2020-07-02 PROCEDURE — 82140 ASSAY OF AMMONIA: CPT

## 2020-07-02 PROCEDURE — 84484 ASSAY OF TROPONIN QUANT: CPT

## 2020-07-02 PROCEDURE — 87088 URINE BACTERIA CULTURE: CPT

## 2020-07-02 PROCEDURE — 99232 SBSQ HOSP IP/OBS MODERATE 35: CPT | Performed by: FAMILY MEDICINE

## 2020-07-02 PROCEDURE — 82550 ASSAY OF CK (CPK): CPT

## 2020-07-02 PROCEDURE — 2580000003 HC RX 258: Performed by: STUDENT IN AN ORGANIZED HEALTH CARE EDUCATION/TRAINING PROGRAM

## 2020-07-02 PROCEDURE — 80053 COMPREHEN METABOLIC PANEL: CPT

## 2020-07-02 PROCEDURE — 6360000002 HC RX W HCPCS

## 2020-07-02 PROCEDURE — 83735 ASSAY OF MAGNESIUM: CPT

## 2020-07-02 PROCEDURE — 85018 HEMOGLOBIN: CPT

## 2020-07-02 PROCEDURE — 82330 ASSAY OF CALCIUM: CPT

## 2020-07-02 PROCEDURE — 80048 BASIC METABOLIC PNL TOTAL CA: CPT

## 2020-07-02 PROCEDURE — 6370000000 HC RX 637 (ALT 250 FOR IP)

## 2020-07-02 PROCEDURE — 6370000000 HC RX 637 (ALT 250 FOR IP): Performed by: INTERNAL MEDICINE

## 2020-07-02 PROCEDURE — 85025 COMPLETE CBC W/AUTO DIFF WBC: CPT

## 2020-07-02 PROCEDURE — 2000000000 HC ICU R&B

## 2020-07-02 PROCEDURE — 85014 HEMATOCRIT: CPT

## 2020-07-02 PROCEDURE — 6360000002 HC RX W HCPCS: Performed by: STUDENT IN AN ORGANIZED HEALTH CARE EDUCATION/TRAINING PROGRAM

## 2020-07-02 RX ORDER — DIMETHICONE, OXYBENZONE, AND PADIMATE O 2; 2.5; 6.6 G/100G; G/100G; G/100G
STICK TOPICAL
Status: COMPLETED
Start: 2020-07-02 | End: 2020-07-02

## 2020-07-02 RX ORDER — PANTOPRAZOLE SODIUM 40 MG/10ML
40 INJECTION, POWDER, LYOPHILIZED, FOR SOLUTION INTRAVENOUS 2 TIMES DAILY
Status: DISCONTINUED | OUTPATIENT
Start: 2020-07-02 | End: 2020-07-04

## 2020-07-02 RX ORDER — POTASSIUM CHLORIDE 20 MEQ/1
TABLET, EXTENDED RELEASE ORAL
Status: COMPLETED
Start: 2020-07-02 | End: 2020-07-02

## 2020-07-02 RX ORDER — DEXTROSE MONOHYDRATE 50 MG/ML
INJECTION, SOLUTION INTRAVENOUS CONTINUOUS
Status: DISCONTINUED | OUTPATIENT
Start: 2020-07-02 | End: 2020-07-02

## 2020-07-02 RX ORDER — MAGNESIUM SULFATE 1 G/100ML
1 INJECTION INTRAVENOUS ONCE
Status: COMPLETED | OUTPATIENT
Start: 2020-07-02 | End: 2020-07-02

## 2020-07-02 RX ORDER — SODIUM CHLORIDE 9 MG/ML
10 INJECTION INTRAVENOUS DAILY
Status: DISCONTINUED | OUTPATIENT
Start: 2020-07-02 | End: 2020-07-04

## 2020-07-02 RX ORDER — POTASSIUM CHLORIDE 7.45 MG/ML
10 INJECTION INTRAVENOUS
Status: COMPLETED | OUTPATIENT
Start: 2020-07-02 | End: 2020-07-02

## 2020-07-02 RX ORDER — POTASSIUM CHLORIDE 20 MEQ/1
40 TABLET, EXTENDED RELEASE ORAL 2 TIMES DAILY
Status: ACTIVE | OUTPATIENT
Start: 2020-07-02 | End: 2020-07-03

## 2020-07-02 RX ORDER — POTASSIUM CHLORIDE 7.45 MG/ML
INJECTION INTRAVENOUS
Status: COMPLETED
Start: 2020-07-02 | End: 2020-07-02

## 2020-07-02 RX ADMIN — POTASSIUM CHLORIDE 40 MEQ: 20 TABLET, EXTENDED RELEASE ORAL at 19:03

## 2020-07-02 RX ADMIN — MAGNESIUM SULFATE HEPTAHYDRATE 1 G: 1 INJECTION, SOLUTION INTRAVENOUS at 10:31

## 2020-07-02 RX ADMIN — DEXTROSE MONOHYDRATE: 50 INJECTION, SOLUTION INTRAVENOUS at 09:11

## 2020-07-02 RX ADMIN — RIFAXIMIN 200 MG: 200 TABLET ORAL at 08:23

## 2020-07-02 RX ADMIN — SODIUM CHLORIDE, PRESERVATIVE FREE 10 ML: 5 INJECTION INTRAVENOUS at 12:18

## 2020-07-02 RX ADMIN — Medication 10 ML: at 08:23

## 2020-07-02 RX ADMIN — POTASSIUM CHLORIDE 10 MEQ: 7.46 INJECTION, SOLUTION INTRAVENOUS at 19:03

## 2020-07-02 RX ADMIN — POTASSIUM CHLORIDE: 149 INJECTION, SOLUTION, CONCENTRATE INTRAVENOUS at 10:26

## 2020-07-02 RX ADMIN — PANTOPRAZOLE SODIUM 40 MG: 40 INJECTION, POWDER, FOR SOLUTION INTRAVENOUS at 12:18

## 2020-07-02 RX ADMIN — POTASSIUM CHLORIDE 10 MEQ: 7.46 INJECTION, SOLUTION INTRAVENOUS at 22:25

## 2020-07-02 RX ADMIN — PANTOPRAZOLE SODIUM 40 MG: 40 INJECTION, POWDER, FOR SOLUTION INTRAVENOUS at 19:58

## 2020-07-02 RX ADMIN — POTASSIUM CHLORIDE 10 MEQ: 7.46 INJECTION, SOLUTION INTRAVENOUS at 19:57

## 2020-07-02 RX ADMIN — PROCHLORPERAZINE EDISYLATE 10 MG: 5 INJECTION INTRAMUSCULAR; INTRAVENOUS at 20:19

## 2020-07-02 RX ADMIN — SODIUM CHLORIDE 8 MG/HR: 9 INJECTION, SOLUTION INTRAVENOUS at 04:01

## 2020-07-02 RX ADMIN — POTASSIUM CHLORIDE: 149 INJECTION, SOLUTION, CONCENTRATE INTRAVENOUS at 19:57

## 2020-07-02 RX ADMIN — POTASSIUM CHLORIDE 10 MEQ: 7.46 INJECTION, SOLUTION INTRAVENOUS at 21:10

## 2020-07-02 RX ADMIN — Medication: at 08:22

## 2020-07-02 RX ADMIN — Medication 10 ML: at 19:57

## 2020-07-02 RX ADMIN — OCTREOTIDE ACETATE 50 MCG/HR: 500 INJECTION, SOLUTION INTRAVENOUS; SUBCUTANEOUS at 06:30

## 2020-07-02 ASSESSMENT — PAIN SCALES - GENERAL
PAINLEVEL_OUTOF10: 0

## 2020-07-02 NOTE — PROGRESS NOTES
Critical Care Team: Daily Progress Note         Date and time: 7/2/2020 11:58 AM    Patient's name:  Vitaliy Daley    Medical Record Number: [id number]    Patient's account/billing number: [de-identified]    Patient's YOB: 1965    Age: 48 y.o. Date of Admission: 6/30/2020 10:34 PM    Length of stay during current admission: 1    Primary Care Physician: Alex Perkins MD    Previous Pulmonary: None    Code Status: Full Code    PMH:  No past medical history on file. PSH:   Past Surgical History:   Procedure Laterality Date    UPPER GASTROINTESTINAL ENDOSCOPY N/A 10/21/2019    EGD BIOPSY performed by Jian Christian MD at [address] [place] [address]       Reason for ICU admission:   1. GI bleed, elevated ammonia level      Subjective:     Events in the past 24 Hours:   1. Admitted to the ICU  2. Transfused  3. Acidemia corrected  4. Hyperammonemia resolved  5. Seen by gastroenterology  6. Octreotide stopped  7. Protonix infusion continues  8. Mentation has improved  9.  Await EGD now      Current ventilation:     [] Ventilator  [] BIPAP/AVAPS  [] Nasal Cannula [x] Room Air      Secretions      Amount:  [] Small [] Moderate  _ Large  [x] None  Color:     - White - Colored  - Bloody    Sedation:  RAAS Score:  [] Propofol gtt  [] Fentanyl gtt  [] Ativan gtt   [x] No Sedation    Paralyzed?:  [x] No    [] Yes      Vasopressors:  [x] No    [] Yes    If yes -   [] Levophed       [] Dopamine     [] Vasopressin       [] Dobutamine  [] Phenylephrine         [] Epinephrine    Central line:     [] No   [x] Yes         If yes -       [] Right IJ     [] Left IJ [] Right Femoral [] Left Femoral                   [] Right Subclavian [] Left Subclavian       Pro catheter:   [] No   [x] Yes     Urine output:            [x] Good   [] Low              [] Anuric      Objective:     Vital signs:  /74   Pulse 97   Temp 97.3 °F (36.3 °C) (Bladder)   Resp 27   Ht 5' (1.524 m)   Wt 108 lb 11 oz (49.3 kg) SpO2 100%   BMI 21.23 kg/m²   Tmax over 24 hours:  Temp (24hrs), Av.2 °F (36.2 °C), Min:96.4 °F (35.8 °C), Max:97.8 °F (36.6 °C)      Patient Vitals for the past 6 hrs:   BP Temp Temp src Pulse Resp SpO2   20 0800 125/74 97.3 °F (36.3 °C) Bladder 97 27 100 %   20 0700 129/74 -- -- 90 20 100 %   20 0600 122/76 -- -- 91 22 100 %         Intake/Output Summary (Last 24 hours) at 2020 1158  Last data filed at 2020 0914  Gross per 24 hour   Intake 4510.43 ml   Output 3850 ml   Net 660.43 ml     Wt Readings from Last 2 Encounters:   20 108 lb 11 oz (49.3 kg)   10/20/19 100 lb (45.4 kg)     Body mass index is 21.23 kg/m². Physical examination:    General: No distress. Icteric. Eyes: PERRL. No sclera icterus. No conjunctival injection. ENT: No discharge. Pharynx clear. Neck: Trachea midline. Normal thyroid. Resp: No accessory muscle use. No rales. No wheezing. No rhonchi. CV: Regular rate. Regular rhythm. No murmur or rub. Abd: Non-tender. Non-distended. No masses. No organmegaly. Normal bowel sounds. Skin: Warm and dry. No nodules on exposed extremities. No rash on exposed extremities. Ext: No cyanosis, clubbing, edema  Lymph: No cervical LAD. No supraclavicular LAD. M/S: No cyanosis. No joint deformity. No clubbing. Neuro: Positive pupils/gag/corneals. Normal pain response.     Medications:    Scheduled Meds:   pantoprazole  40 mg Intravenous BID    And    sodium chloride (PF)  10 mL Intravenous Daily    sodium chloride flush  10 mL Intravenous 2 times per day     Continuous Infusions:   IV infusion builder 100 mL/hr at 20 1026     PRN Meds:   sodium chloride flush, 10 mL, PRN  acetaminophen, 650 mg, Q6H PRN    Or  acetaminophen, 650 mg, Q6H PRN  polyethylene glycol, 17 g, Daily PRN  prochlorperazine, 10 mg, Q6H PRN          Vent Settings (Comprehensive) (if applicable):  Vent Information  SpO2: 100 %  Additional Respiratory  Assessments  Pulse: 97  Resp: 27  SpO2: 100 %  Oral Care: Mouth swabbed    ABGs:   Recent Labs     07/01/20  2201   PH 7.488*   PCO2 27.2*   PO2 120.9*   HCO3 20.2*   BE -2.7   O2SAT 98.4       Laboratory findings:    Complete Blood Count:   Recent Labs     06/30/20  2311 07/01/20  0800  07/01/20 2005 07/02/20  0300 07/02/20  0610   WBC 12.7* 11.2  --   --   --  7.2   HGB 12.4 7.8*   < > 6.7* 8.8* 8.9*   HCT 35.5 22.9*   < > 19.3* 25.2* 25.8*    110*  --   --   --  86*    < > = values in this interval not displayed.         Last 3 Blood Glucose:   Recent Labs     07/01/20  0800 07/01/20 1750 07/02/20  0610   GLUCOSE 134* 182* 170*        PT/INR:    Lab Results   Component Value Date    PROTIME 17.1 07/01/2020    INR 1.4 07/01/2020     PTT:    Lab Results   Component Value Date    APTT 31.5 07/01/2020       Comprehensive Metabolic Profile:   Recent Labs     07/01/20  0800 07/01/20  1750 07/02/20  0610    141 148*   K 3.4* 3.8 3.1*    110* 113*   CO2 12* 16* 25   * 111* 87*   CREATININE 2.6* 2.2* 1.6*   GLUCOSE 134* 182* 170*   CALCIUM 6.4* 6.7* 7.3*   PROT 5.4*  --  5.5*   LABALBU 2.5*  --  2.4*   BILITOT 2.3*  --  1.9*   ALKPHOS 99  --  91   AST 1,054*  --  748*   *  --  253*      Magnesium:   Lab Results   Component Value Date    MG 1.8 07/02/2020     Phosphorus:   Lab Results   Component Value Date    PHOS 2.9 07/02/2020     Ionized Calcium:   Lab Results   Component Value Date    CAION 1.07 07/02/2020        Urinalysis:     Troponin:   Recent Labs     07/01/20  1750 07/02/20  0120 07/02/20  0610   TROPONINI 0.07* 0.09* 0.08*       Microbiology past 24h: Pending    Blood cultures:                 [] None drawn      [] Negative             []  Positive (Details:  )  Urine Culture:                   [] None drawn      [] Negative             []  Positive (Details:  )  Sputum Culture:               [] None drawn       [] Negative             []  Positive (Details:  )   Endotracheal aspirate:     [] None drawn [] Negative             []  Positive (Details:  )     Other Pertinent Labs and Pathology Results:   1. Radiology/Imaging:     CT ABDOMEN PELVIS WO CONTRAST Additional Contrast? None   Final Result   1. Uneven fat infiltration of the liver. No dilatation of the biliary   tree and pancreatic ductal system. 2. Presence of a gallstones. Development of calcification adherent to   the inner wall of the gallbladder towards the fundal region, it can be   an indication of chronic cholecystitis with developing porcelain   gallbladder. Final interpretation can require correlation with   histopathology. 3. No acute inflammatory changes in the omental mesenteric fat planes   free intraperitoneal air ascites. No indication for bowel obstruction. 4. No obstructive uropathy. Uneven fat infiltration of the liver. CT Head WO Contrast   Final Result      No evidence of acute intracranial hemorrhage or edema. XR CHEST PORTABLE   Final Result      No airspace opacities or pleural effusion. Assessment:     Active Problems:    Hepatic encephalopathy (HCC)  Resolved Problems:    * No resolved hospital problems. *      Additional assessment:    1. GI bleed: Likely peptic ulcer disease with reactivation  2. History of alcohol abuse  3. Hyperammonemia: Resolved  4. New free water deficit  5. Elevated CPK: Unclear etiology        Plan:     Wean per protocol:  [] No   [] Yes  [x] N/A    ICU Prophylaxis:  Stress ulcer:  [x] PPI Agent  [] [id number] [] Sucralfate  [] Other:  VTE:   [] Enoxaparin  [] Unfract. Heparin Subcut  [x] EPC Cuffs    Nutrition:  [] NPO [] Tube Feeding (Specify: ) [] TPN  [x] PO (Diet: DIET FULL LIQUID;)    Home medications reconciled: [] No  [x] Yes    Insulin drip:   [x] No   [] Yes    Family updated:    [x] No   [] Yes    Transfer Candidate?:   [x] No   [] Yes    Additional plans:  1. Adjust fluids to provide free water  2.  Clear liquid diet  3. Await further input from gastroenterology  4. Stop Xifaxan  5. Stop octreotide          Chart review/lab review/X-ray viewing/documentation: 15 minutes  Assessment: 10 minutes  Conversation patient/family re: prognosis, care options and any end of life issues: 10 minutes  Conversation with staff: 20 minutes  Total critical care time exclusive of procedures: 55 minutes    If the patient is a COVID 19 isolation patient, the above physical exam reflects that of the examining physician for the day. Daysi Lu M.D. Matt TOLLIVER P

## 2020-07-02 NOTE — PLAN OF CARE
Problem: Falls - Risk of:  Goal: Will remain free from falls  Description: Will remain free from falls  Outcome: Met This Shift  Goal: Absence of physical injury  Description: Absence of physical injury  Outcome: Met This Shift     Problem: Skin Integrity:  Goal: Will show no infection signs and symptoms  Description: Will show no infection signs and symptoms  Outcome: Met This Shift  Goal: Absence of new skin breakdown  Description: Absence of new skin breakdown  Outcome: Met This Shift     Problem: Bleeding:  Goal: Will show no signs and symptoms of excessive bleeding  Description: Will show no signs and symptoms of excessive bleeding  Outcome: Met This Shift

## 2020-07-02 NOTE — CARE COORDINATION
Plan remains for pt to return home w/  on discharge pending progress-awaiting PT/OT eval to assist in discharge [place]

## 2020-07-02 NOTE — PLAN OF CARE
Problem: Falls - Risk of:  Goal: Will remain free from falls  Description: Will remain free from falls  Outcome: Met This Shift  Goal: Absence of physical injury  Description: Absence of physical injury  Outcome: Met This Shift     Problem: Skin Integrity:  Goal: Absence of new skin breakdown  Description: Absence of new skin breakdown  Outcome: Met This Shift     Problem: Skin Integrity:  Goal: Will show no infection signs and symptoms  Description: Will show no infection signs and symptoms  Outcome: Not Met This Shift     Problem: Bleeding:  Goal: Will show no signs and symptoms of excessive bleeding  Description: Will show no signs and symptoms of excessive bleeding  Outcome: Not Met This Shift

## 2020-07-02 NOTE — PROGRESS NOTES
Alert.  Coherent  Recalls essentially nothing of this illness  All labs improving  Stable Hct  No muscle pain, no abdominal pain    No asterixis  No ascites    Will do EGD tomorrow

## 2020-07-02 NOTE — PROGRESS NOTES
prochlorperazine      Wt Readings from Last 3 Encounters:   07/02/20 108 lb 11 oz (49.3 kg)   10/20/19 100 lb (45.4 kg)   12/23/17 100 lb (45.4 kg)     I/O last 3 completed shifts: In: 4217.2 [I.V.:3122.2; Blood:455; IV Piggyback:640]  Out: 6949 [Urine:3150; Stool:500]    Intake/Output Summary (Last 24 hours) at 7/2/2020 1030  Last data filed at 7/2/2020 0914  Gross per 24 hour   Intake 4510.43 ml   Output 3850 ml   Net 660.43 ml       Vitals:    07/02/20 0800   BP: 125/74   Pulse: 97   Resp: 27   Temp: 97.3 °F (36.3 °C)   SpO2: 100%       Physical Exam:                   General appearance - oriented to person, place, and time and ill-appearing  Mental status - inappropriate safety awareness  Chest - clear to auscultation, no wheezes, rales or rhonchi, symmetric air entry  Heart - normal rate, regular rhythm, normal S1, S2, no murmurs, rubs, clicks or gallops  Abdomen - soft, nontender, nondistended, no masses or organomegaly  }  Extremities - peripheral pulses normal, no pedal edema, no clubbing or cyanosis  Skin - normal coloration and turgor, no rashes, no suspicious skin lesions noted. Sl jaundice                           CBC  Recent Labs     06/30/20  2311 07/01/20  0800  07/01/20  2005 07/02/20  0300 07/02/20  0610   WBC 12.7* 11.2  --   --   --  7.2   HGB 12.4 7.8*   < > 6.7* 8.8* 8.9*   HCT 35.5 22.9*   < > 19.3* 25.2* 25.8*   .4* 118.0*  --   --   --  106.2*    110*  --   --   --  86*    < > = values in this interval not displayed.      BMP  Recent Labs     07/01/20  0800 07/01/20  1750 07/02/20  0610    141 148*   K 3.4* 3.8 3.1*    110* 113*   CO2 12* 16* 25   * 111* 87*   CREATININE 2.6* 2.2* 1.6*   LABGLOM 19 23 34   CALCIUM 6.4* 6.7* 7.3*     LFT's  Recent Labs     06/30/20  2311 07/01/20  0800 07/02/20  0610   * 265* 253*     INR:   Recent Labs     07/01/20  0327   INR 1.4         No results found for: CRP  No results found for: SEDRATE  No results for input(s): POCGLU in the last 72 hours. Lipids: No results for input(s): CHOL, HDL in the last 72 hours. Invalid input(s): LDLCALCU  ABGs: No results found for: PHART, PO2ART, XGL4TPD  SpO2 Readings from Last 1 Encounters:   07/02/20 100%       Last 3 Troponin:    Lab Results   Component Value Date    TROPONINI 0.08 07/02/2020    TROPONINI 0.09 07/02/2020    TROPONINI 0.07 07/01/2020     Lab Results   Component Value Date    CKTOTAL 5,073 (H) 07/02/2020    CKTOTAL 9,960 (H) 07/01/2020    TROPONINI 0.08 (H) 07/02/2020    TROPONINI 0.09 (H) 07/02/2020    TROPONINI 0.07 (H) 07/01/2020        TSH:    Lab Results   Component Value Date    TSH 5.760 10/17/2019      Vent Information  SpO2: 100 %      U/A:     Lab Results   Component Value Date    COLORU Yellow 07/01/2020    PHUR 6.0 07/01/2020    WBCUA 10-20 07/01/2020    RBCUA 2-5 07/01/2020    BACTERIA MANY 07/01/2020    CLARITYU SL CLOUDY 07/01/2020    SPECGRAV 1.015 07/01/2020    LEUKOCYTESUR LARGE 07/01/2020    UROBILINOGEN 0.2 07/01/2020    BILIRUBINUR SMALL 07/01/2020    BLOODU LARGE 07/01/2020    GLUCOSEU Negative 07/01/2020          Iron studies:  Lab Results   Component Value Date    FERRITIN 44 10/19/2019     Bone disease:  Lab Results   Component Value Date    MG 1.8 07/02/2020    PHOS 2.9 07/02/2020     Nutrition:No results found for: ALB           Assessment:  Patient Active Problem List   Diagnosis Code    Anemia D64.9    Iron deficiency anemia due to chronic blood loss D50.0    Anemia due to GI blood loss D50.0    Hepatic encephalopathy (HCC) K72.90          1. Hepatic encephalopathy              Ammonia 130 , 189 now 25              On lactulose     2. . GI bleed              Melanotic stools              hgb 8    Transfused x1 for scope tomorrow  3. Acute blood loss anemia     4. Macrocytosis     5.   Alcoholic liver disease     6.  neri              Severe dehydration                7.  Metabolic acidosis              Anion gap 32              Low bicarb     8.  uti     9.  Elevated ck   Renal consult    10.  gallstones           Plan:    Cont present care  For scope tomorrow  Rose Lockett M.D.    7/2/2020

## 2020-07-03 ENCOUNTER — ANESTHESIA EVENT (OUTPATIENT)
Dept: OPERATING ROOM | Age: 55
DRG: 280 | End: 2020-07-03
Payer: COMMERCIAL

## 2020-07-03 ENCOUNTER — ANESTHESIA (OUTPATIENT)
Dept: OPERATING ROOM | Age: 55
DRG: 280 | End: 2020-07-03
Payer: COMMERCIAL

## 2020-07-03 VITALS — SYSTOLIC BLOOD PRESSURE: 110 MMHG | DIASTOLIC BLOOD PRESSURE: 55 MMHG | OXYGEN SATURATION: 99 %

## 2020-07-03 PROBLEM — E44.0 MODERATE PROTEIN-CALORIE MALNUTRITION (HCC): Chronic | Status: ACTIVE | Noted: 2020-07-03

## 2020-07-03 LAB
ALBUMIN SERPL-MCNC: 2.3 G/DL (ref 3.5–5.2)
ALP BLD-CCNC: 98 U/L (ref 35–104)
ALT SERPL-CCNC: 233 U/L (ref 0–32)
AMMONIA: 25.3 UMOL/L (ref 11–51)
ANION GAP SERPL CALCULATED.3IONS-SCNC: 8 MMOL/L (ref 7–16)
ANION GAP SERPL CALCULATED.3IONS-SCNC: 8 MMOL/L (ref 7–16)
ANISOCYTOSIS: ABNORMAL
AST SERPL-CCNC: 510 U/L (ref 0–31)
BASOPHILS ABSOLUTE: 0 E9/L (ref 0–0.2)
BASOPHILS RELATIVE PERCENT: 0 % (ref 0–2)
BILIRUB SERPL-MCNC: 1.4 MG/DL (ref 0–1.2)
BUN BLDV-MCNC: 48 MG/DL (ref 6–20)
BUN BLDV-MCNC: 51 MG/DL (ref 6–20)
CALCIUM IONIZED: 1.19 MMOL/L (ref 1.15–1.33)
CALCIUM SERPL-MCNC: 6.6 MG/DL (ref 8.6–10.2)
CALCIUM SERPL-MCNC: 7.9 MG/DL (ref 8.6–10.2)
CHLORIDE BLD-SCNC: 105 MMOL/L (ref 98–107)
CHLORIDE BLD-SCNC: 108 MMOL/L (ref 98–107)
CO2: 20 MMOL/L (ref 22–29)
CO2: 24 MMOL/L (ref 22–29)
CREAT SERPL-MCNC: 0.9 MG/DL (ref 0.5–1)
CREAT SERPL-MCNC: 1.1 MG/DL (ref 0.5–1)
EOSINOPHILS ABSOLUTE: 0.29 E9/L (ref 0.05–0.5)
EOSINOPHILS RELATIVE PERCENT: 4 % (ref 0–6)
GFR AFRICAN AMERICAN: >60
GFR AFRICAN AMERICAN: >60
GFR NON-AFRICAN AMERICAN: 52 ML/MIN/1.73
GFR NON-AFRICAN AMERICAN: >60 ML/MIN/1.73
GLUCOSE BLD-MCNC: 126 MG/DL (ref 74–99)
GLUCOSE BLD-MCNC: 180 MG/DL (ref 74–99)
HCT VFR BLD CALC: 25 % (ref 34–48)
HEMOGLOBIN: 8.5 G/DL (ref 11.5–15.5)
LYMPHOCYTES ABSOLUTE: 1.39 E9/L (ref 1.5–4)
LYMPHOCYTES RELATIVE PERCENT: 19 % (ref 20–42)
MAGNESIUM: 1.6 MG/DL (ref 1.6–2.6)
MCH RBC QN AUTO: 37 PG (ref 26–35)
MCHC RBC AUTO-ENTMCNC: 34 % (ref 32–34.5)
MCV RBC AUTO: 108.7 FL (ref 80–99.9)
METAMYELOCYTES RELATIVE PERCENT: 3 % (ref 0–1)
MONOCYTES ABSOLUTE: 0.88 E9/L (ref 0.1–0.95)
MONOCYTES RELATIVE PERCENT: 12 % (ref 2–12)
MYELOCYTE PERCENT: 3 % (ref 0–0)
NEUTROPHILS ABSOLUTE: 4.75 E9/L (ref 1.8–7.3)
NEUTROPHILS RELATIVE PERCENT: 59 % (ref 43–80)
PAPPENHEIMER BODIES: ABNORMAL
PDW BLD-RTO: 21.1 FL (ref 11.5–15)
PHOSPHORUS: 1.6 MG/DL (ref 2.5–4.5)
PLATELET # BLD: 71 E9/L (ref 130–450)
PLATELET CONFIRMATION: NORMAL
PMV BLD AUTO: 10.1 FL (ref 7–12)
POIKILOCYTES: ABNORMAL
POLYCHROMASIA: ABNORMAL
POTASSIUM SERPL-SCNC: 4.5 MMOL/L (ref 3.5–5)
POTASSIUM SERPL-SCNC: 4.7 MMOL/L (ref 3.5–5)
RBC # BLD: 2.3 E12/L (ref 3.5–5.5)
SODIUM BLD-SCNC: 136 MMOL/L (ref 132–146)
SODIUM BLD-SCNC: 137 MMOL/L (ref 132–146)
TARGET CELLS: ABNORMAL
TEAR DROP CELLS: ABNORMAL
TOTAL CK: 2784 U/L (ref 20–180)
TOTAL PROTEIN: 5.3 G/DL (ref 6.4–8.3)
VACUOLATED NEUTROPHILS: ABNORMAL
WBC # BLD: 7.3 E9/L (ref 4.5–11.5)

## 2020-07-03 PROCEDURE — 83735 ASSAY OF MAGNESIUM: CPT

## 2020-07-03 PROCEDURE — 82140 ASSAY OF AMMONIA: CPT

## 2020-07-03 PROCEDURE — 1200000000 HC SEMI PRIVATE

## 2020-07-03 PROCEDURE — 2580000003 HC RX 258: Performed by: INTERNAL MEDICINE

## 2020-07-03 PROCEDURE — 82550 ASSAY OF CK (CPK): CPT

## 2020-07-03 PROCEDURE — 7100000010 HC PHASE II RECOVERY - FIRST 15 MIN: Performed by: INTERNAL MEDICINE

## 2020-07-03 PROCEDURE — 2709999900 HC NON-CHARGEABLE SUPPLY: Performed by: INTERNAL MEDICINE

## 2020-07-03 PROCEDURE — 88342 IMHCHEM/IMCYTCHM 1ST ANTB: CPT

## 2020-07-03 PROCEDURE — 84100 ASSAY OF PHOSPHORUS: CPT

## 2020-07-03 PROCEDURE — 7100000011 HC PHASE II RECOVERY - ADDTL 15 MIN: Performed by: INTERNAL MEDICINE

## 2020-07-03 PROCEDURE — 6360000002 HC RX W HCPCS: Performed by: INTERNAL MEDICINE

## 2020-07-03 PROCEDURE — 3700000001 HC ADD 15 MINUTES (ANESTHESIA): Performed by: INTERNAL MEDICINE

## 2020-07-03 PROCEDURE — 2580000003 HC RX 258: Performed by: GENERAL PRACTICE

## 2020-07-03 PROCEDURE — 2500000003 HC RX 250 WO HCPCS: Performed by: INTERNAL MEDICINE

## 2020-07-03 PROCEDURE — 0W3P8ZZ CONTROL BLEEDING IN GASTROINTESTINAL TRACT, VIA NATURAL OR ARTIFICIAL OPENING ENDOSCOPIC: ICD-10-PCS | Performed by: INTERNAL MEDICINE

## 2020-07-03 PROCEDURE — 36415 COLL VENOUS BLD VENIPUNCTURE: CPT

## 2020-07-03 PROCEDURE — 80053 COMPREHEN METABOLIC PANEL: CPT

## 2020-07-03 PROCEDURE — 2720000010 HC SURG SUPPLY STERILE: Performed by: INTERNAL MEDICINE

## 2020-07-03 PROCEDURE — 82330 ASSAY OF CALCIUM: CPT

## 2020-07-03 PROCEDURE — 88305 TISSUE EXAM BY PATHOLOGIST: CPT

## 2020-07-03 PROCEDURE — 80048 BASIC METABOLIC PNL TOTAL CA: CPT

## 2020-07-03 PROCEDURE — 2580000003 HC RX 258: Performed by: NURSE ANESTHETIST, CERTIFIED REGISTERED

## 2020-07-03 PROCEDURE — 3600007502: Performed by: INTERNAL MEDICINE

## 2020-07-03 PROCEDURE — 85025 COMPLETE CBC W/AUTO DIFF WBC: CPT

## 2020-07-03 PROCEDURE — 6360000002 HC RX W HCPCS: Performed by: NURSE ANESTHETIST, CERTIFIED REGISTERED

## 2020-07-03 PROCEDURE — 6370000000 HC RX 637 (ALT 250 FOR IP)

## 2020-07-03 PROCEDURE — 3700000000 HC ANESTHESIA ATTENDED CARE: Performed by: INTERNAL MEDICINE

## 2020-07-03 PROCEDURE — C9113 INJ PANTOPRAZOLE SODIUM, VIA: HCPCS | Performed by: INTERNAL MEDICINE

## 2020-07-03 PROCEDURE — 3600007512: Performed by: INTERNAL MEDICINE

## 2020-07-03 PROCEDURE — 99231 SBSQ HOSP IP/OBS SF/LOW 25: CPT | Performed by: FAMILY MEDICINE

## 2020-07-03 RX ORDER — SODIUM CHLORIDE 9 MG/ML
INJECTION, SOLUTION INTRAVENOUS CONTINUOUS
Status: DISCONTINUED | OUTPATIENT
Start: 2020-07-03 | End: 2020-07-05

## 2020-07-03 RX ORDER — DIMETHICONE, OXYBENZONE, AND PADIMATE O 2; 2.5; 6.6 G/100G; G/100G; G/100G
STICK TOPICAL
Status: COMPLETED
Start: 2020-07-03 | End: 2020-07-03

## 2020-07-03 RX ORDER — PROPOFOL 10 MG/ML
INJECTION, EMULSION INTRAVENOUS PRN
Status: DISCONTINUED | OUTPATIENT
Start: 2020-07-03 | End: 2020-07-03 | Stop reason: SDUPTHER

## 2020-07-03 RX ORDER — SODIUM CHLORIDE 9 MG/ML
INJECTION, SOLUTION INTRAVENOUS CONTINUOUS PRN
Status: DISCONTINUED | OUTPATIENT
Start: 2020-07-03 | End: 2020-07-03 | Stop reason: SDUPTHER

## 2020-07-03 RX ORDER — MAGNESIUM SULFATE IN WATER 40 MG/ML
2 INJECTION, SOLUTION INTRAVENOUS ONCE
Status: COMPLETED | OUTPATIENT
Start: 2020-07-03 | End: 2020-07-03

## 2020-07-03 RX ADMIN — PROPOFOL 150 MG: 10 INJECTION, EMULSION INTRAVENOUS at 15:05

## 2020-07-03 RX ADMIN — PANTOPRAZOLE SODIUM 40 MG: 40 INJECTION, POWDER, FOR SOLUTION INTRAVENOUS at 08:19

## 2020-07-03 RX ADMIN — SODIUM CHLORIDE: 9 INJECTION, SOLUTION INTRAVENOUS at 10:28

## 2020-07-03 RX ADMIN — Medication: at 17:15

## 2020-07-03 RX ADMIN — Medication 10 ML: at 21:00

## 2020-07-03 RX ADMIN — SODIUM CHLORIDE: 9 INJECTION, SOLUTION INTRAVENOUS at 15:00

## 2020-07-03 RX ADMIN — POTASSIUM CHLORIDE: 149 INJECTION, SOLUTION, CONCENTRATE INTRAVENOUS at 05:53

## 2020-07-03 RX ADMIN — Medication 10 ML: at 08:19

## 2020-07-03 RX ADMIN — SODIUM CHLORIDE, PRESERVATIVE FREE 10 ML: 5 INJECTION INTRAVENOUS at 08:19

## 2020-07-03 RX ADMIN — SODIUM PHOSPHATE, MONOBASIC, MONOHYDRATE 20 MMOL: 276; 142 INJECTION, SOLUTION INTRAVENOUS at 10:32

## 2020-07-03 RX ADMIN — MAGNESIUM SULFATE HEPTAHYDRATE 2 G: 40 INJECTION, SOLUTION INTRAVENOUS at 10:27

## 2020-07-03 ASSESSMENT — PULMONARY FUNCTION TESTS
PIF_VALUE: 1
PIF_VALUE: 1
PIF_VALUE: 2
PIF_VALUE: 0
PIF_VALUE: 1
PIF_VALUE: 2
PIF_VALUE: 1
PIF_VALUE: 2
PIF_VALUE: 0
PIF_VALUE: 1
PIF_VALUE: 1
PIF_VALUE: 0
PIF_VALUE: 1

## 2020-07-03 ASSESSMENT — PAIN SCALES - GENERAL
PAINLEVEL_OUTOF10: 0

## 2020-07-03 ASSESSMENT — PAIN - FUNCTIONAL ASSESSMENT: PAIN_FUNCTIONAL_ASSESSMENT: ACTIVITIES ARE NOT PREVENTED

## 2020-07-03 ASSESSMENT — PAIN DESCRIPTION - PROGRESSION: CLINICAL_PROGRESSION: RAPIDLY IMPROVING

## 2020-07-03 ASSESSMENT — PAIN DESCRIPTION - PAIN TYPE
TYPE: SURGICAL PAIN
TYPE: SURGICAL PAIN

## 2020-07-03 ASSESSMENT — ENCOUNTER SYMPTOMS: SHORTNESS OF BREATH: 0

## 2020-07-03 NOTE — BRIEF OP NOTE
Brief Postoperative Note      Patient: Ivan Taylor  YOB: 1965  MRN: [id number]    Date of Procedure: 7/3/2020    Pre-Op Diagnosis: GI Bleeding    Post-Op Diagnosis: 3 prepyloric gastric ulcers, no duodenal this time and no varices.  One ulcer may have had a 1 mm vessel, all had red spots at the base, one maybe slightly protuberant, clips x 2       Procedure(s):  EGD BIOPSY    Surgeon(s):  Selam Kovacs MD    Assistant:  * No surgical staff found *    Anesthesia: Monitor Anesthesia Care    Estimated Blood Loss (mL): 0    Complications: None    Specimens:   ID Type Source Tests Collected by Time Destination   A : STOMACH BIOPSY (ANTRAL) Tissue Tissue SURGICAL PATHOLOGY Selam Kovacs MD 7/3/2020 1516        Implants:  * No implants in log *      Drains:   Urethral Catheter Temperature probe 16 fr (Active)   $ Urethral catheter insertion $ Not inserted for procedure 7/1/2020  6:15 AM   Catheter Indications Need for fluid management in critically ill patients in a critical care setting not able to be managed by other means such as BSC with hat, bedpan, urinal, condom catheter, or short term intermittent urethral catherization 7/3/2020  8:00 AM   Site Assessment No urethral drainage 7/3/2020  8:00 AM   Urine Color Yellow 7/3/2020  8:00 AM   Urine Appearance Clear 7/3/2020  8:00 AM   Output (mL) 275 mL 7/3/2020  2:07 PM       Findings: see above    Electronically signed by Selam Kovacs MD on 7/3/2020 at 3:20 PM

## 2020-07-03 NOTE — ANESTHESIA PRE PROCEDURE
Department of Anesthesiology  Preprocedure Note       Name:  Chacho Bower   Age:  48 y.o.  :  1965                                          MRN:  [id number]         Date:  7/3/2020      Surgeon: Xavier Avila    Procedure: EGD    Medications prior to admission:   Prior to Admission medications    Medication Sig Start Date End Date Taking?  Authorizing Provider   sertraline (ZOLOFT) 100 MG tablet Take 1.5 tablets by mouth daily 20  Yes Petey Hinton MD   spironolactone (ALDACTONE) 25 MG tablet Take 1 tablet by mouth daily 10/22/19  Yes Petey Hinton MD   ferrous sulfate (CHESTER-AMANDA) 325 (65 Fe) MG tablet Take 1 tablet by mouth daily (with breakfast) 10/22/19  Yes Petey Hinton MD   omeprazole (PRILOSEC) 40 MG delayed release capsule Take 1 capsule by mouth every morning (before breakfast) 10/22/19   Petey Hinton MD   furosemide (LASIX) 20 MG tablet Take 1 tablet by mouth daily 10/22/19   Petey Hinton MD       Current medications:    Current Facility-Administered Medications   Medication Dose Route Frequency Provider Last Rate Last Dose    0.9 % sodium chloride infusion   Intravenous Continuous John Robins  mL/hr at 20 1028      pantoprazole (PROTONIX) injection 40 mg  40 mg Intravenous BID John Robins MD   40 mg at 20 0819    And    sodium chloride (PF) 0.9 % injection 10 mL  10 mL Intravenous Daily John Robins MD   10 mL at 20 0819    potassium chloride (KLOR-CON M) extended release tablet 40 mEq  40 mEq Oral BID Shar Alvarado MD   40 mEq at 20 1903    sodium chloride flush 0.9 % injection 10 mL  10 mL Intravenous 2 times per day John Robins MD   10 mL at 20 0819    sodium chloride flush 0.9 % injection 10 mL  10 mL Intravenous PRN John Robins MD        acetaminophen (TYLENOL) tablet 650 mg  650 mg Oral Q6H PRN John Robins MD        Or    acetaminophen (TYLENOL) suppository 650 mg  650 mg Rectal Q6H PRN Hua Marie Missy Evans MD        polyethylene glycol (GLYCOLAX) packet 17 g  17 g Oral Daily PRN Keyona Cain MD        prochlorperazine (COMPAZINE) injection 10 mg  10 mg Intravenous Q6H PRN Keyona Cain MD   10 mg at 07/02/20 2019       Allergies: Allergies   Allergen Reactions    Pcn [Penicillins]        Problem List:    Patient Active Problem List   Diagnosis Code    Anemia D64.9    Iron deficiency anemia due to chronic blood loss D50.0    Anemia due to GI blood loss D50.0    Hepatic encephalopathy (HCC) K72.90    Moderate protein-calorie malnutrition (HCC) E44.0       Past Medical History:        Diagnosis Date    Anemia     ETOH abuse        Past Surgical History:        Procedure Laterality Date    UPPER GASTROINTESTINAL ENDOSCOPY N/A 10/21/2019    EGD BIOPSY performed by Lew Joshua MD at [facility] ENDOSCOPY       Social History:    Social History     Tobacco Use    Smoking status: Former Smoker    Smokeless tobacco: Never Used   Substance Use Topics    Alcohol use: Yes     Alcohol/week: 5.0 standard drinks     Types: 5 Standard drinks or equivalent per week     Comment: tequila daily                                Counseling given: Not Answered      Vital Signs (Current):   Vitals:    07/03/20 0800 07/03/20 0900 07/03/20 1000 07/03/20 1100   BP: 90/63 98/62 90/62 93/63   Pulse: 88 91 87 96   Resp: 17 30 16 18   Temp: 98.5 °F (36.9 °C)      TempSrc: Oral      SpO2: 100% 99% 100% 99%   Weight:       Height:                                                  BP Readings from Last 3 Encounters:   07/03/20 93/63   11/29/19 120/71   10/22/19 117/68       NPO Status:  greater than 8 hours                                                                               BMI:   Wt Readings from Last 3 Encounters:   07/03/20 109 lb 5.6 oz (49.6 kg)   10/20/19 100 lb (45.4 kg)   12/23/17 100 lb (45.4 kg)     Body mass index is 21.36 kg/m².     CBC:   Lab Results   Component Value Date    WBC 7.3 07/03/2020    RBC 2.30 07/03/2020    HGB 8.5 07/03/2020    HCT 25.0 07/03/2020    .7 07/03/2020    RDW 21.1 07/03/2020    PLT 71 07/03/2020       CMP:   Lab Results   Component Value Date     07/03/2020    K 4.5 07/03/2020    K 3.1 06/30/2020     07/03/2020    CO2 24 07/03/2020    BUN 51 07/03/2020    CREATININE 1.1 07/03/2020    GFRAA >60 07/03/2020    LABGLOM 52 07/03/2020    GLUCOSE 126 07/03/2020    PROT 5.3 07/03/2020    CALCIUM 7.9 07/03/2020    BILITOT 1.4 07/03/2020    ALKPHOS 98 07/03/2020     07/03/2020     07/03/2020       POC Tests: No results for input(s): POCGLU, POCNA, POCK, POCCL, POCBUN, POCHEMO, POCHCT in the last 72 hours. Coags:   Lab Results   Component Value Date    PROTIME 17.1 07/01/2020    INR 1.4 07/01/2020    APTT 31.5 07/01/2020       HCG (If Applicable):   Lab Results   Component Value Date    PREGTESTUR negative 12/23/2017        ABGs: No results found for: PHART, PO2ART, [id number], SBC8WPK, BEART, [id number]     Type & Screen (If Applicable):  No results found for: LABABO, [address]    Anesthesia Evaluation  Patient summary reviewed and Nursing notes reviewed no history of anesthetic complications:   Airway: Mallampati: II  TM distance: >3 FB   Neck ROM: full  Mouth opening: > = 3 FB Dental:      Comment: Missing teeth   Extremely loose side upper teeth    Pulmonary: breath sounds clear to auscultation      (-) asthma and shortness of breath                          ROS comment: Former smoker   Cardiovascular:  Exercise tolerance: good (>4 METS),       (-) past MI and CAD    ECG reviewed  Rhythm: regular  Rate: normal                    Neuro/Psych:   (+) depression/anxiety    (-) psychiatric history           GI/Hepatic/Renal:   (+) PUD, liver disease (cirrhosis with ascites):,          ROS comment: GI bleed scheduled for EGD for further evaluation  Hepatic encephalopathy .    Endo/Other:    (+) blood dyscrasia: anemia:., .                  ROS comment: ETOH abuse Abdominal: Vascular: negative vascular ROS. Anesthesia Plan      MAC     ASA 3       Induction: intravenous. Anesthetic plan and risks discussed with patient. Use of blood products discussed with patient whom consented to blood products. Plan discussed with CRNA.               Rickey GOMEZ  7/3/20

## 2020-07-03 NOTE — CONSULTS
Critical Care Team - Daily Progress Note      Date and time: 7/3/2020 8:00 AM  Patient's name:  Mali Alvarez  Medical Record Number: [id number]  Patient's account/billing number: [de-identified]  Patient's YOB: 1965  Age: 48 y.o. Date of Admission: 6/30/2020 10:34 PM  Length of stay during current admission: 2      Primary Care Physician: Van Scott MD  ICU Attending Physician: Dr. Prashanth Gonzalez Status: Full Code    Reason for ICU admission:Hepatic encephalopathy      SUBJECTIVE:     OVERNIGHT EVENTS:       Significant improvement in mental status over the course the evening. Otherwise no acute events.     AWAKE & FOLLOWING COMMANDS:  [] No   [] Yes    CURRENT VENTILATION STATUS:     [] Ventilator  [] BIPAP  [] Nasal Cannula [x] Room Air      IF INTUBATED, ET TUBE MARKING AT LOWER LIP:       cms    SECRETIONS Amount:  [] Small [] Moderate  [] Large  [x] None  Color:     [] White [] Colored  [] Bloody    SEDATION:  RAAS Score:  [] Propofol gtt  [] Versed gtt  [] Ativan gtt   [x] No Sedation    PARALYZED:  [x] No    [] Yes    DIARRHEA:                [] No                [] Yes  (C. Difficile status: [] positive                                                                                                                       [] negative                                                                                                                     [] pending)    VASOPRESSORS:  [x] No    [] Yes    If yes -   [] Levophed       [] Dopamine     [] Vasopressin       [] Dobutamine  [] Phenylephrine         [] Epinephrine    CENTRAL LINES:     [x] No   [] Yes   (Date of Insertion:   )           If yes -     [] Right IJ     [] Left IJ [] Right Femoral [] Left Femoral                   [] Right Subclavian [] Left Subclavian       HONG'S CATHETER:   [] No   [x] Yes  (Date of Insertion:   )     URINE OUTPUT:            [x] Good   [] Low              [] Anuric      OBJECTIVE:     VITAL SIGNS:  /66 Pulse 88   Temp 98.1 °F (36.7 °C) (Bladder)   Resp 16   Ht 5' (1.524 m)   Wt 109 lb 5.6 oz (49.6 kg)   SpO2 100%   BMI 21.36 kg/m²   Tmax over 24 hours:  Temp (24hrs), Av.6 °F (36.4 °C), Min:97.2 °F (36.2 °C), Max:98.1 °F (36.7 °C)      Patient Vitals for the past 6 hrs:   BP Temp Temp src Pulse Resp SpO2   20 0700 103/66 -- -- 88 16 100 %   20 0600 101/65 -- -- 92 18 99 %   20 0500 98/66 -- -- 82 16 100 %   20 0400 96/68 98.1 °F (36.7 °C) Bladder 82 16 100 %   20 0300 90/62 -- -- 84 16 100 %         Intake/Output Summary (Last 24 hours) at 7/3/2020 0800  Last data filed at 7/3/2020 0630  Gross per 24 hour   Intake 4157.07 ml   Output 2080 ml   Net 2077.07 ml     Wt Readings from Last 2 Encounters:   20 109 lb 5.6 oz (49.6 kg)   10/20/19 100 lb (45.4 kg)     Body mass index is 21.36 kg/m².         PHYSICAL EXAMINATION:    General appearance - alert, well appearing, and in no distress and oriented to person, place, and time  Mental status - alert, oriented to person, place, and time  Eyes - pupils equal and reactive, extraocular eye movements intact  Ears - bilateral TM's and external ear canals normal  Nose - not examined  Mouth - mucous membranes moist, pharynx normal without lesions  Neck - supple, no significant adenopathy  Chest - clear to auscultation, no wheezes, rales or rhonchi, symmetric air entry  Heart - normal rate, regular rhythm, normal S1, S2, no murmurs, rubs, clicks or gallops  Abdomen - soft, nontender, nondistended, no masses or organomegaly  Neurological - alert, oriented, normal speech, no focal findings or movement disorder noted}  Extremities - peripheral pulses normal, no pedal edema, no clubbing or cyanosis  Skin - normal coloration and turgor, no rashes, no suspicious skin lesions noted      Any additional physical findings:    MEDICATIONS:    Scheduled Meds:   pantoprazole  40 mg Intravenous BID    And    sodium chloride (PF)  10 mL Intravenous Daily    potassium chloride  40 mEq Oral BID    sodium chloride flush  10 mL Intravenous 2 times per day     Continuous Infusions:   IV infusion builder 100 mL/hr at 07/03/20 0553     PRN Meds:   sodium chloride flush, 10 mL, PRN  acetaminophen, 650 mg, Q6H PRN    Or  acetaminophen, 650 mg, Q6H PRN  polyethylene glycol, 17 g, Daily PRN  prochlorperazine, 10 mg, Q6H PRN          VENT SETTINGS (Comprehensive) (if applicable):  Vent Information  SpO2: 100 %  Additional Respiratory  Assessments  Pulse: 88  Resp: 16  SpO2: 100 %  Oral Care: Lip moisturizer applied, Mouth swabbed    ABGs:     Laboratory findings:    Complete Blood Count:   Recent Labs     07/01/20  0800  07/02/20  0610 07/02/20  1215 07/02/20 1811 07/03/20  0555   WBC 11.2  --  7.2  --   --  7.3   HGB 7.8*   < > 8.9* 9.1* 8.4* 8.5*   HCT 22.9*   < > 25.8* 26.3* 24.1* 25.0*   *  --  86*  --   --  71*    < > = values in this interval not displayed.         Last 3 Blood Glucose:   Recent Labs     07/02/20 1811 07/03/20  0250 07/03/20  0555   GLUCOSE 198* 180* 126*        PT/INR:    Lab Results   Component Value Date    PROTIME 17.1 07/01/2020    INR 1.4 07/01/2020     PTT:    Lab Results   Component Value Date    APTT 31.5 07/01/2020       Comprehensive Metabolic Profile:   Recent Labs     07/02/20 1811 07/03/20  0250 07/03/20  0555    136 137   K 2.8* 4.7 4.5    108* 105   CO2 25 20* 24   BUN 64* 48* 51*   CREATININE 1.3* 0.9 1.1*   GLUCOSE 198* 180* 126*   CALCIUM 7.6* 6.6* 7.9*   PROT  --   --  5.3*   LABALBU  --   --  2.3*   BILITOT  --   --  1.4*   ALKPHOS  --   --  98   AST  --   --  510*   ALT  --   --  233*      Magnesium:   Lab Results   Component Value Date    MG 1.6 07/03/2020     Phosphorus:   Lab Results   Component Value Date    PHOS 1.6 07/03/2020     Ionized Calcium:   Lab Results   Component Value Date    CAION 1.19 07/03/2020        Urinalysis:     Troponin:   Recent Labs     07/01/20  1750 07/02/20  0120

## 2020-07-03 NOTE — PROGRESS NOTES
[facility] MD Tony Szymanski MD Eleni Seller, MD. Kacy Sewell MD   Progress Note    7/3/2020    SUBJECTIVE:   On RA   euvolemic   No tender spots on exam   Lying flat in NAD     PROBLEM LIST:    Active Problems:    Hepatic encephalopathy ([address]  Resolved Problems:    * No resolved hospital problems. *       DIET:    Diet NPO, After Midnight Exceptions are: Sips with Meds       Allergies : Pcn [penicillins]    Past Medical History:   Diagnosis Date    Anemia     ETOH abuse        Past Surgical History:   Procedure Laterality Date    UPPER GASTROINTESTINAL ENDOSCOPY N/A 10/21/2019    EGD BIOPSY performed by Nickolas Hubbard MD at [address] [place] [address]       No family history on file. reports that she has quit smoking. She has never used smokeless tobacco. She reports current alcohol use of about 5.0 standard drinks of alcohol per week. She reports that she does not use drugs. Review of Systems:   Constitutional: weak   Eyes: no eye pain , no itching , no drainage  Ears, nose, mouth, throat, and face: no ear ,nose pain , hearing is ok ,no nasal drainage   Respiratory: no sob ,no cough ,no wheezing . Cardiovascular: no chest pain , no palpitation ,no sob . Gastrointestinal: no nausea, vomiting , constipation , no abdominal pain . Genitourinary:no urinary retention , no burning , dysuria . No polyuria   Hematologic/lymphatic: no bleeding , no cougulation issues . Musculoskeletal:no joint pain , no swelling . Neurological: no headaches ,no weakness , no numbness . Endocrine: no thirst , no weight issues .      MEDS (scheduled):    sodium phosphate IVPB  20 mmol Intravenous Once    pantoprazole  40 mg Intravenous BID    And    sodium chloride (PF)  10 mL Intravenous Daily    potassium chloride  40 mEq Oral BID    sodium chloride flush  10 mL Intravenous 2 times per day       MEDS (infusions):   sodium chloride 100 mL/hr at 07/03/20 1028       MEDS (prn):  sodium chloride flush, acetaminophen **OR** acetaminophen, polyethylene glycol, prochlorperazine    PHYSICAL EXAM:     Patient Vitals for the past 24 hrs:   BP Temp Temp src Pulse Resp SpO2 Weight   07/03/20 1100 93/63 -- -- 96 18 99 % --   07/03/20 1000 90/62 -- -- 87 16 100 % --   07/03/20 0900 98/62 -- -- 91 30 99 % --   07/03/20 0800 90/63 98.5 °F (36.9 °C) Oral 88 17 100 % --   07/03/20 0700 103/66 -- -- 88 16 100 % --   07/03/20 0600 101/65 -- -- 92 18 99 % --   07/03/20 0500 98/66 -- -- 82 16 100 % --   07/03/20 0400 96/68 98.1 °F (36.7 °C) Bladder 82 16 100 % --   07/03/20 0300 90/62 -- -- 84 16 100 % --   07/03/20 0200 104/67 -- -- 84 26 -- 109 lb 5.6 oz (49.6 kg)   07/03/20 0100 (!) 87/57 -- -- 83 15 -- --   07/03/20 0000 (!) 83/57 97.9 °F (36.6 °C) Bladder 84 15 99 % --   07/02/20 2300 (!) 83/59 -- -- 82 16 -- --   07/02/20 2200 (!) 85/56 -- -- 88 16 -- --   07/02/20 2100 97/66 -- -- 84 16 -- --   07/02/20 2000 111/63 97.3 °F (36.3 °C) Bladder 93 25 99 % --   07/02/20 1900 102/66 -- -- 89 16 -- --   07/02/20 1800 104/62 -- -- 87 17 -- --   07/02/20 1700 109/64 -- -- 94 22 -- --   07/02/20 1600 101/66 97.3 °F (36.3 °C) Bladder 90 17 99 % --   07/02/20 1500 121/88 -- -- 87 16 100 % --   07/02/20 1400 118/76 -- -- 70 28 90 % --   @      Intake/Output Summary (Last 24 hours) at 7/3/2020 1313  Last data filed at 7/3/2020 1145  Gross per 24 hour   Intake 3393.81 ml   Output 1905 ml   Net 1488.81 ml         Wt Readings from Last 3 Encounters:   07/03/20 109 lb 5.6 oz (49.6 kg)   10/20/19 100 lb (45.4 kg)   12/23/17 100 lb (45.4 kg)       Constitutional:  in no acute distress  Oral: mucus membranes moist  Neck: no JVD  Cardiovascular: S1, S2 regular rhythm, no murmur,or rub  Respiratory:  No crackles, no wheeze  Gastrointestinal:  Soft, nontender, nondistended, NABS  Ext: no edema, feet warm  Skin: dry, no rash  Neuro: awake, alert, interactive      DATA:    Recent Labs     07/01/20  0800  07/02/20  0610 07/02/20  1215 07/02/20  1811 07/03/20  0555   WBC 11.2  --  7.2  --   --  7.3   HGB 7.8*   < > 8.9* 9.1* 8.4* 8.5*   HCT 22.9*   < > 25.8* 26.3* 24.1* 25.0*   .0*  --  106.2*  --   --  108.7*   *  --  86*  --   --  71*    < > = values in this interval not displayed. Recent Labs     06/30/20  2311 07/01/20  0800  07/02/20  0610 07/02/20  1811 07/03/20  0250 07/03/20  0555    140   < > 148* 138 136 137   K 3.1* 3.4*   < > 3.1* 2.8* 4.7 4.5   CL 88* 106   < > 113* 104 108* 105   CO2 14* 12*   < > 25 25 20* 24   MG 2.8*  --   --  1.8  --   --  1.6   PHOS  --   --   --  2.9  --   --  1.6*   * 120*   < > 87* 64* 48* 51*   CREATININE 3.3* 2.6*   < > 1.6* 1.3* 0.9 1.1*   * 265*  --  253*  --   --  233*   AST 1,727* 1,054*  --  748*  --   --  510*   BILIDIR 2.6*  --   --   --   --   --   --    BILITOT 3.3* 2.3*  --  1.9*  --   --  1.4*   ALKPHOS 150* 99  --  91  --   --  98    < > = values in this interval not displayed. No results found for: LABPROT    ASSESSMENT / RECOMMENDATIONS:      1. Acute kidney injury due to volume depletion along with rhabdomyolysis unlikely to be due to hepatorenal syndrome as it improved significantly with crystalloid  fluid. 2.  Hepatic encephalopathy. 3.  Gastrointestinal bleed, there was concern for peptic ulcer disease. 4.  Alcohol abuse  5.   Hypokalemia.     PLAN:    Cr improving   CK trending down   No tender spots   Continue to look clinially euvolemic       Recommendation :   Continue iv fluids in form of NS at 100 cc /hr   Replace phohsp   Am labs (bmp , ck , mg , phosphour )        Electronically signed by Darrell Villela MD on 7/3/2020 at 1:13 PM

## 2020-07-03 NOTE — PROGRESS NOTES
improving, teeth missing, +I/O 2L, no edema, +BS, jaundice  · Wound Type: None  · Current Nutrition Therapies:  · Oral Diet Orders: NPO   · Oral Diet intake: 1-25%(PTA x 2 d reported)  · Anthropometric Measures:  · Ht: 5' (152.4 cm)   · Current Body Wt: 109 lb (49.4 kg)(7/3 bedwt)  · Admission Body Wt: 100 lb (45.4 kg)(7/1 bedwt)  · Usual Body Wt: 102 lb (46.3 kg)(per EMR in Oct 2019)  · % Weight Change:  ,   none significant  · Ideal Body Wt: 100 lb (45.4 kg), % Ideal Body 100% (admit wt)  · BMI Classification: BMI 18.5 - 24.9 Normal Weight    Nutrition Interventions:   Continue NPO(ADAT when medically feasible and will add ONS at that time)  Continued Inpatient Monitoring, Education Not Indicated    Nutrition Evaluation:   · Evaluation: Goals set   · Goals: Nutrition progression    · Monitoring: Nutrition Progression, Meal Intake, Supplement Intake, Diet Tolerance, Skin Integrity, I&O, Mental Status/Confusion, Weight, Pertinent Labs, Monitor Bowel Function      Electronically signed by Jose Aden RD, CNSC, LD on 7/3/20 at 2:10 PM EDT    Contact Number: [phone]

## 2020-07-03 NOTE — PROGRESS NOTES
Nursing Transfer Note    Data:  Summary of patients progress: EGD  Reason for transfer: continuation of care    Action:  Explained reason for transfer to patient. Family not in waiting room per pt  Report given to: RN using RN Handoff Navigator.   Mode of transportation: cart    Response:  RN Recommendations: follow post op orders

## 2020-07-03 NOTE — PLAN OF CARE
Problem: Falls - Risk of:  Goal: Will remain free from falls  Description: Will remain free from falls  7/3/2020 0842 by Erma Mar RN  Outcome: Met This Shift  7/3/2020 0027 by Marchelle Hatchet, RN  Outcome: Met This Shift  Goal: Absence of physical injury  Description: Absence of physical injury  7/3/2020 0842 by Erma aMr RN  Outcome: Met This Shift  7/3/2020 0027 by Marchelle Hatchet, RN  Outcome: Met This Shift     Problem: Skin Integrity:  Goal: Will show no infection signs and symptoms  Description: Will show no infection signs and symptoms  7/3/2020 0842 by Erma Mar RN  Outcome: Met This Shift  7/3/2020 0027 by Marchelle Hatchet, RN  Outcome: Met This Shift  Goal: Absence of new skin breakdown  Description: Absence of new skin breakdown  7/3/2020 0842 by Erma Mar RN  Outcome: Met This Shift  7/3/2020 0027 by Marchelle Hatchet, RN  Outcome: Met This Shift     Problem: Bleeding:  Goal: Will show no signs and symptoms of excessive bleeding  Description: Will show no signs and symptoms of excessive bleeding  7/3/2020 0842 by Erma Mar RN  Outcome: Met This Shift  7/3/2020 0027 by Marchelle Hatchet, RN  Outcome: Met This Shift

## 2020-07-03 NOTE — PROGRESS NOTES
Patient informed of Dr. Estrella Dubon plan to perform EGD. Patient states she doesn't remember being spoken with. When asking about consent she fell asleep. Upon arousing her again she refused having been informed of the EGD. Informed her I would speak with her  regarding consent. Willem Blevins called and consent received from him via telephone and 2 RN. All questions answered.

## 2020-07-03 NOTE — PATIENT CARE CONFERENCE
Intensive Care Daily Quality Rounding Checklist        ICU Team Members: Dr. Micah Pedroza, Dr. Izzy Liu (resident), charge nurse, bedside nurse     ICU Day #: NUMBER: 3     Intubation Date: N/A     Ventilator Day #: N/A     Central Line Insertion Date: N/A                                                    Day #: N/A      Arterial Line Insertion Date: N/A                             Day #: N/A     DVT Prophylaxis: PCDs    GI Prophylaxis: Protonix      Pro Catheter Insertion Date: July 1                                        Day #: 3                             Continued need (if yes, reason documented and discussed with physician): yes, strict I/O     Skin Issues/ Wounds and ordered treatment discussed on rounds: none      Goals/ Plans for the Day: Daily labs, EGD today, PT/ OT, transfer to floor

## 2020-07-03 NOTE — FLOWSHEET NOTE
Priyank Lind Seat at bedside. Updated on patient being transferred to another unit and will notify when room assigned. Also told of possible EGD time of 1400. All questions answered.

## 2020-07-03 NOTE — FLOWSHEET NOTE
Oliva Parrish called and notified of patient going to EGD now and room change to 704. 7W nurse's station number given to Oliva Parrish. All questions answered. Patient's belongings put on gurney with her.

## 2020-07-03 NOTE — PLAN OF CARE
Problem: Falls - Risk of:  Goal: Will remain free from falls  Description: Will remain free from falls  7/3/2020 1827 by Srinivas Morelos RN  Outcome: Met This Shift     Problem: Falls - Risk of:  Goal: Absence of physical injury  Description: Absence of physical injury  7/3/2020 1827 by Srinivas Morelos RN  Outcome: Met This Shift  7/3/2020 0842 by Mickie Toro RN  Outcome: Met This Shift

## 2020-07-03 NOTE — PROGRESS NOTES
Admit Date: 6/30/2020       Subjective:  Chief Complaint   Patient presents with    Extremity Weakness     Pt has progressively worsening weakness since this weekend. Hx of anemia. Denies recent obvious bleeding. She is awake and alert. Conversational.  No pain.  IV fluids running    hgb 8.5,  Was 8.9 , plate 71 was 86,  K+ 4.5  Bun/cr  51/1.1 improving  Bili decreased to 1.9,  ast 748  Ammonia down to 25  Ck down to 2700  Phosphorous low 1.6    Iv fluids continued  Monitor labs       Urine culture neegative  For scope today       Objective:    Scheduled Meds:  Current Facility-Administered Medications   Medication Dose Route Frequency Provider Last Rate Last Dose    magnesium sulfate 2 g in 50 mL IVPB premix  2 g Intravenous Once Opal Villagomez MD 25 mL/hr at 07/03/20 1027 2 g at 07/03/20 1027    sodium phosphate 20 mmol in dextrose 5 % 500 mL IVPB  20 mmol Intravenous Once Opal Villagomez  mL/hr at 07/03/20 1032 20 mmol at 07/03/20 1032    0.9 % sodium chloride infusion   Intravenous Continuous Opal Villagomez  mL/hr at 07/03/20 1028      pantoprazole (PROTONIX) injection 40 mg  40 mg Intravenous BID Opal Villagomez MD   40 mg at 07/03/20 0819    And    sodium chloride (PF) 0.9 % injection 10 mL  10 mL Intravenous Daily Opal Villagomez MD   10 mL at 07/03/20 0819    potassium chloride (KLOR-CON M) extended release tablet 40 mEq  40 mEq Oral BID Shelley Corley MD   40 mEq at 07/02/20 1903    sodium chloride flush 0.9 % injection 10 mL  10 mL Intravenous 2 times per day Opal Villagomez MD   10 mL at 07/03/20 0819    sodium chloride flush 0.9 % injection 10 mL  10 mL Intravenous PRN Opal Villagomez MD        acetaminophen (TYLENOL) tablet 650 mg  650 mg Oral Q6H PRN Opal Villagomez MD        Or    acetaminophen (TYLENOL) suppository 650 mg  650 mg Rectal Q6H PRN Opal Villagomez MD        polyethylene glycol (GLYCOLAX) packet 17 g  17 g Oral Daily PRN Opal Villagomez MD  prochlorperazine (COMPAZINE) injection 10 mg  10 mg Intravenous Q6H PRN Conchita Saleem MD   10 mg at 07/02/20 2019       Continuous Infusions  :   sodium chloride 100 mL/hr at 07/03/20 1028       PRN Meds:  sodium chloride flush, acetaminophen **OR** acetaminophen, polyethylene glycol, prochlorperazine      Wt Readings from Last 3 Encounters:   07/03/20 109 lb 5.6 oz (49.6 kg)   10/20/19 100 lb (45.4 kg)   12/23/17 100 lb (45.4 kg)     I/O last 3 completed shifts: In: 4157.1 [P.O.:1200; I.V.:2527.8; IV Piggyback:429.3]  Out: 2280 [Urine:2280]    Intake/Output Summary (Last 24 hours) at 7/3/2020 1058  Last data filed at 7/3/2020 1000  Gross per 24 hour   Intake 3873.81 ml   Output 2025 ml   Net 1848.81 ml       Vitals:    07/03/20 1000   BP: 90/62   Pulse: 87   Resp: 16   Temp:    SpO2: 100%       Physical Exam:                   General appearance - oriented to person, place, and time and ill-appearing  Mental status - inappropriate safety awareness  Chest - clear to auscultation, no wheezes, rales or rhonchi, symmetric air entry  Heart - normal rate, regular rhythm, normal S1, S2, no murmurs, rubs, clicks or gallops  Abdomen - soft, nontender, nondistended, no masses or organomegaly  }  Extremities - peripheral pulses normal, no pedal edema, no clubbing or cyanosis  Skin - normal coloration and turgor, no rashes, no suspicious skin lesions noted. Sl jaundice                           CBC  Recent Labs     07/01/20  0800  07/02/20  0610 07/02/20  1215 07/02/20  1811 07/03/20  0555   WBC 11.2  --  7.2  --   --  7.3   HGB 7.8*   < > 8.9* 9.1* 8.4* 8.5*   HCT 22.9*   < > 25.8* 26.3* 24.1* 25.0*   .0*  --  106.2*  --   --  108.7*   *  --  86*  --   --  71*    < > = values in this interval not displayed.      BMP  Recent Labs     07/02/20  1811 07/03/20  0250 07/03/20  0555    136 137   K 2.8* 4.7 4.5    108* 105   CO2 25 20* 24   BUN 64* 48* 51*   CREATININE 1.3* 0.9 1.1*   LABGLOM 43 >60 52   CALCIUM 7.6* 6.6* 7.9*     LFT's  Recent Labs     07/01/20  0800 07/02/20  0610 07/03/20  0555   * 253* 233*     INR:   Recent Labs     07/01/20  0327   INR 1.4         No results found for: CRP  No results found for: SEDRATE  No results for input(s): POCGLU in the last 72 hours. Lipids: No results for input(s): CHOL, HDL in the last 72 hours. Invalid input(s): LDLCALCU  ABGs: No results found for: PHART, PO2ART, YOZ9TRK  SpO2 Readings from Last 1 Encounters:   07/03/20 100%       Last 3 Troponin:    Lab Results   Component Value Date    TROPONINI 0.08 07/02/2020    TROPONINI 0.09 07/02/2020    TROPONINI 0.07 07/01/2020     Lab Results   Component Value Date    CKTOTAL 2,784 (H) 07/03/2020    CKTOTAL 5,073 (H) 07/02/2020    CKTOTAL 9,960 (H) 07/01/2020    TROPONINI 0.08 (H) 07/02/2020    TROPONINI 0.09 (H) 07/02/2020    TROPONINI 0.07 (H) 07/01/2020        TSH:    Lab Results   Component Value Date    TSH 5.760 10/17/2019      Vent Information  SpO2: 100 %      U/A:     Lab Results   Component Value Date    COLORU Yellow 07/01/2020    PHUR 6.0 07/01/2020    WBCUA 10-20 07/01/2020    RBCUA 2-5 07/01/2020    BACTERIA MANY 07/01/2020    CLARITYU SL CLOUDY 07/01/2020    SPECGRAV 1.015 07/01/2020    LEUKOCYTESUR LARGE 07/01/2020    UROBILINOGEN 0.2 07/01/2020    BILIRUBINUR SMALL 07/01/2020    BLOODU LARGE 07/01/2020    GLUCOSEU Negative 07/01/2020          Iron studies:  Lab Results   Component Value Date    FERRITIN 44 10/19/2019     Bone disease:  Lab Results   Component Value Date    MG 1.6 07/03/2020    PHOS 1.6 (L) 07/03/2020     Nutrition:No results found for: ALB           Assessment:  Patient Active Problem List   Diagnosis Code    Anemia D64.9    Iron deficiency anemia due to chronic blood loss D50.0    Anemia due to GI blood loss D50.0    Hepatic encephalopathy (HCC) K72.90          1. Hepatic encephalopathy              Ammonia 130 , 189 now 25              On lactulose     2. Guzman Nieto  GI bleed              Melanotic stools              hgb 8    Transfused x1 for scope tomorrow  3. Acute blood loss anemia     4. Macrocytosis     5.   Alcoholic liver disease     6.  neri              Severe dehydration                7.  Metabolic acidosis              Anion gap 32              Low bicarb     8.  uti     9.  Elevated ck   Renal consult    10.  gallstones           Plan:    Cont present care    Duke Pratt M.D.    7/3/2020

## 2020-07-03 NOTE — PLAN OF CARE
Problem: Malnutrition  (NI-5.2)  Goal: Food and/or Nutrient Delivery  Pt will tolerate nutrition progression to meet needs  Description: Individualized approach for food/nutrient provision.   Outcome: Ongoing

## 2020-07-03 NOTE — ANESTHESIA POSTPROCEDURE EVALUATION
Department of Anesthesiology  Postprocedure Note    Patient: Carolann Salgado  MRN: [id number]  [place]: 1965  Date of evaluation: 7/3/2020  Time:  4:50 PM     Procedure Summary     Date:  07/03/20 Room / Location:  [facility] OR [address] [place] [address]    Anesthesia Start:  1500 Anesthesia Stop:  1684    Procedures:       EGD BIOPSY (N/A Esophagus)      EGD CONTROL HEMORRHAGE Diagnosis:       Hepatic encephalopathy ([facility] [address]      (HEPATIC ENCEPHALOPATHY)    Surgeon:  Myranda Romero MD Responsible Provider:  Kadie Mike DO    Anesthesia Type:  MAC ASA Status:  3          Anesthesia Type: No value filed. Reymundo Phase I:      Reymundo Phase II: Reymundo Score: 10    Last vitals: Reviewed and per EMR flowsheets.        Anesthesia Post Evaluation    Patient location during evaluation: PACU  Patient participation: complete - patient participated  Level of consciousness: awake and alert  Airway patency: patent  Nausea & Vomiting: no nausea and no vomiting  Complications: no  Cardiovascular status: hemodynamically stable  Respiratory status: acceptable  Hydration status: euvolemic

## 2020-07-03 NOTE — OP NOTE
[address] [address]                                OPERATIVE REPORT    PATIENT NAME: Dennie Plough                     :        1965  MED REC NO:   [id number]                            ROOM:  ACCOUNT NO:   [de-identified]                           ADMIT DATE: 2020  PROVIDER:     Cyndi Cannon MD    DATE OF PROCEDURE:  2020    PROCEDURE PERFORMED:  Esophagogastroduodenoscopy plus clipping of a  possible visible vessel. HISTORY:  She is 55. She is an alcoholic, seems to have been abstinent. Previously, she had been evaluated for anemia without kyle bleeding. She had ascites, but there was no confirmation of anything other than  alcoholic hepatitis and cirrhosis and portal hypertension were not  confirmed. She had no varices in October of last year. She presented  with acute renal failure, confusion, no ascites and markedly elevated  liver function studies, though a substantial portion of that was likely  related to rhabdomyolysis with a CPK of 9000 and an AST of 1700. She  had melena. She has had a stable hemoglobin. Preop is monitored anesthesia care. DESCRIPTION OF PROCEDURE:  With her in the left lateral decubitus  position and sedated, a bite block was placed. The Olympus GIF-H180  video endoscope was guided gently into the oropharynx and down the  length of a normal-appearing esophagus without varices to the stomach,  which was devoid of any content including blood or even clear fluid. The body, fundus, and cardia were minimally erythematous. There was no  mucosal disease proximal.  Distally in the prepyloric antrum, there were  three small 5 to 6 mm gastric ulcers with benign features. Each had at  least one small red spot at the base. The one in the middle along the  lesser curvature had a red spot, which seemed about 1 mm in size and  perhaps slightly elevated.   The pylorus, bulb, and postbulbar duodenum  were normal.  No duodenal ulcer this time as she had one last time. The  endoscope was withdrawn to the antrum. It was decided to clip what may  be a small vessel. Two clips were applied. Procedure was terminated  and well tolerated. EBL: 0    IMPRESSION:  Three ulcers. No portal hypertension. Transferred out of  the MICU to general medical floor. Diet could be advanced tomorrow.         Ky Walker MD    D: 07/03/2020 15:37:18       T: 07/03/2020 15:47:24     [email or web address]  Job#: [id number]     Doc#: [id number]    CC:  Petey Hinton MD

## 2020-07-04 LAB
ALBUMIN SERPL-MCNC: 2.3 G/DL (ref 3.5–5.2)
ALP BLD-CCNC: 88 U/L (ref 35–104)
ALT SERPL-CCNC: 192 U/L (ref 0–32)
AMMONIA: 25.4 UMOL/L (ref 11–51)
ANION GAP SERPL CALCULATED.3IONS-SCNC: 9 MMOL/L (ref 7–16)
ANISOCYTOSIS: ABNORMAL
AST SERPL-CCNC: 320 U/L (ref 0–31)
BASOPHILS ABSOLUTE: 0 E9/L (ref 0–0.2)
BASOPHILS RELATIVE PERCENT: 0 % (ref 0–2)
BILIRUB SERPL-MCNC: 1.3 MG/DL (ref 0–1.2)
BUN BLDV-MCNC: 35 MG/DL (ref 6–20)
CALCIUM SERPL-MCNC: 7.8 MG/DL (ref 8.6–10.2)
CHLORIDE BLD-SCNC: 110 MMOL/L (ref 98–107)
CO2: 21 MMOL/L (ref 22–29)
CREAT SERPL-MCNC: 0.9 MG/DL (ref 0.5–1)
EOSINOPHILS ABSOLUTE: 0.26 E9/L (ref 0.05–0.5)
EOSINOPHILS RELATIVE PERCENT: 4.5 % (ref 0–6)
GFR AFRICAN AMERICAN: >60
GFR NON-AFRICAN AMERICAN: >60 ML/MIN/1.73
GLUCOSE BLD-MCNC: 95 MG/DL (ref 74–99)
HCT VFR BLD CALC: 27.4 % (ref 34–48)
HEMOGLOBIN: 9.1 G/DL (ref 11.5–15.5)
HYPOCHROMIA: ABNORMAL
LYMPHOCYTES ABSOLUTE: 1.04 E9/L (ref 1.5–4)
LYMPHOCYTES RELATIVE PERCENT: 17.8 % (ref 20–42)
MAGNESIUM: 1.6 MG/DL (ref 1.6–2.6)
MCH RBC QN AUTO: 37.1 PG (ref 26–35)
MCHC RBC AUTO-ENTMCNC: 33.2 % (ref 32–34.5)
MCV RBC AUTO: 111.8 FL (ref 80–99.9)
METAMYELOCYTES RELATIVE PERCENT: 2.7 % (ref 0–1)
MONOCYTES ABSOLUTE: 0.64 E9/L (ref 0.1–0.95)
MONOCYTES RELATIVE PERCENT: 10.7 % (ref 2–12)
MYELOCYTE PERCENT: 3.6 % (ref 0–0)
NEUTROPHILS ABSOLUTE: 3.89 E9/L (ref 1.8–7.3)
NEUTROPHILS RELATIVE PERCENT: 60.7 % (ref 43–80)
NUCLEATED RED BLOOD CELLS: 0 /100 WBC
PDW BLD-RTO: ABNORMAL FL (ref 11.5–15)
PHOSPHORUS: 3.8 MG/DL (ref 2.5–4.5)
PLATELET # BLD: 65 E9/L (ref 130–450)
PLATELET CONFIRMATION: NORMAL
PMV BLD AUTO: 9.6 FL (ref 7–12)
POLYCHROMASIA: ABNORMAL
POTASSIUM SERPL-SCNC: 4.4 MMOL/L (ref 3.5–5)
RBC # BLD: 2.45 E12/L (ref 3.5–5.5)
SODIUM BLD-SCNC: 140 MMOL/L (ref 132–146)
TOTAL CK: 1608 U/L (ref 20–180)
TOTAL PROTEIN: 5.1 G/DL (ref 6.4–8.3)
URINE CULTURE, ROUTINE: NORMAL
WBC # BLD: 5.8 E9/L (ref 4.5–11.5)

## 2020-07-04 PROCEDURE — 85025 COMPLETE CBC W/AUTO DIFF WBC: CPT

## 2020-07-04 PROCEDURE — 1200000000 HC SEMI PRIVATE

## 2020-07-04 PROCEDURE — 84100 ASSAY OF PHOSPHORUS: CPT

## 2020-07-04 PROCEDURE — 80053 COMPREHEN METABOLIC PANEL: CPT

## 2020-07-04 PROCEDURE — 97161 PT EVAL LOW COMPLEX 20 MIN: CPT

## 2020-07-04 PROCEDURE — 36415 COLL VENOUS BLD VENIPUNCTURE: CPT

## 2020-07-04 PROCEDURE — 82550 ASSAY OF CK (CPK): CPT

## 2020-07-04 PROCEDURE — 83735 ASSAY OF MAGNESIUM: CPT

## 2020-07-04 PROCEDURE — 2580000003 HC RX 258: Performed by: INTERNAL MEDICINE

## 2020-07-04 PROCEDURE — 6360000002 HC RX W HCPCS: Performed by: INTERNAL MEDICINE

## 2020-07-04 PROCEDURE — 99231 SBSQ HOSP IP/OBS SF/LOW 25: CPT | Performed by: FAMILY MEDICINE

## 2020-07-04 PROCEDURE — C9113 INJ PANTOPRAZOLE SODIUM, VIA: HCPCS | Performed by: INTERNAL MEDICINE

## 2020-07-04 PROCEDURE — 82140 ASSAY OF AMMONIA: CPT

## 2020-07-04 RX ORDER — PANTOPRAZOLE SODIUM 40 MG/1
40 TABLET, DELAYED RELEASE ORAL
Status: DISCONTINUED | OUTPATIENT
Start: 2020-07-05 | End: 2020-07-07 | Stop reason: HOSPADM

## 2020-07-04 RX ADMIN — PANTOPRAZOLE SODIUM 40 MG: 40 INJECTION, POWDER, FOR SOLUTION INTRAVENOUS at 08:29

## 2020-07-04 RX ADMIN — Medication 10 ML: at 08:29

## 2020-07-04 ASSESSMENT — PAIN SCALES - GENERAL
PAINLEVEL_OUTOF10: 0
PAINLEVEL_OUTOF10: 0

## 2020-07-04 ASSESSMENT — PAIN DESCRIPTION - PROGRESSION: CLINICAL_PROGRESSION: RESOLVED

## 2020-07-04 ASSESSMENT — PAIN - FUNCTIONAL ASSESSMENT: PAIN_FUNCTIONAL_ASSESSMENT: ACTIVITIES ARE NOT PREVENTED

## 2020-07-04 NOTE — PROGRESS NOTES
[facility] Quality Flow/Interdisciplinary Rounds Progress Note        Quality Flow Rounds held on July 4, 2020    Disciplines Attending:  Bedside Nurse, ,  and Nursing Unit Leadership    Eleuterio Medina was admitted on 6/30/2020 10:34 PM    Anticipated Discharge Date:  Expected Discharge Date: 07/04/20    Disposition:    Jaleel Score:  Jaleel Scale Score: 19    Readmission Risk              Risk of Unplanned Readmission:        20           Discussed patient goal for the day, patient clinical progression, and barriers to discharge.   The following Goal(s) of the Day/Commitment(s) have been identified:  Pain Control      Ben Lucas  July 4, 2020

## 2020-07-04 NOTE — PROGRESS NOTES
[facility] MD Shree Leung MD Lysbeth Hall, MD. Corina Raman MD   Progress Note    7/4/2020    SUBJECTIVE:   On RA   euvolemic   Alert oriented x3  Lying flat in NAD     PROBLEM LIST:    Active Problems:    Hepatic encephalopathy ([address]    Moderate protein-calorie malnutrition ([address]  Resolved Problems:    * No resolved hospital problems. *       DIET:    DIET GENERAL;       Allergies : Pcn [penicillins]    Past Medical History:   Diagnosis Date    Anemia     ETOH abuse        Past Surgical History:   Procedure Laterality Date    UPPER GASTROINTESTINAL ENDOSCOPY N/A 10/21/2019    EGD BIOPSY performed by Leopoldo Mathews MD at [facility] ENDOSCOPY       No family history on file. reports that she has quit smoking. She has never used smokeless tobacco. She reports current alcohol use of about 5.0 standard drinks of alcohol per week. She reports that she does not use drugs. Review of Systems:   Constitutional: weak   Eyes: no eye pain , no itching , no drainage  Ears, nose, mouth, throat, and face: no ear ,nose pain , hearing is ok ,no nasal drainage   Respiratory: no sob ,no cough ,no wheezing . Cardiovascular: no chest pain , no palpitation ,no sob . Gastrointestinal: no nausea, vomiting , constipation , no abdominal pain . Genitourinary:no urinary retention , no burning , dysuria . No polyuria   Hematologic/lymphatic: no bleeding , no cougulation issues . Musculoskeletal:no joint pain , no swelling . Neurological: no headaches ,no weakness , no numbness . Endocrine: no thirst , no weight issues .      MEDS (scheduled):    [START ON 7/5/2020] pantoprazole  40 mg Oral QAM AC    sodium chloride flush  10 mL Intravenous 2 times per day       MEDS (infusions):   sodium chloride 75 mL/hr at 07/04/20 1111       MEDS (prn):  sodium chloride flush, acetaminophen **OR** acetaminophen, polyethylene glycol, prochlorperazine    PHYSICAL EXAM:     Patient Vitals for the past 24 hrs:   BP Temp Temp src Pulse Resp SpO2   07/04/20 0815 105/77 97.8 °F (36.6 °C) Oral 98 16 93 %   07/03/20 2100 107/70 98.2 °F (36.8 °C) Oral 80 16 100 %   07/03/20 1700 106/67 97.9 °F (36.6 °C) Oral 84 16 99 %   @      Intake/Output Summary (Last 24 hours) at 7/4/2020 1616  Last data filed at 7/4/2020 0316  Gross per 24 hour   Intake 855 ml   Output 950 ml   Net -95 ml         Wt Readings from Last 3 Encounters:   07/03/20 109 lb 5.6 oz (49.6 kg)   10/20/19 100 lb (45.4 kg)   12/23/17 100 lb (45.4 kg)       Constitutional:  in no acute distress  Oral: mucus membranes moist  Neck: no JVD  Cardiovascular: S1, S2 regular rhythm, no murmur,or rub  Respiratory:  No crackles, no wheeze  Gastrointestinal:  Soft, nontender, nondistended, NABS  Ext: no edema, feet warm  Skin: dry, no rash  Neuro: awake, alert, interactive      DATA:    Recent Labs     07/02/20  0610  07/02/20  1811 07/03/20  0555 07/04/20  0436   WBC 7.2  --   --  7.3 5.8   HGB 8.9*   < > 8.4* 8.5* 9.1*   HCT 25.8*   < > 24.1* 25.0* 27.4*   .2*  --   --  108.7* 111.8*   PLT 86*  --   --  71* 65*    < > = values in this interval not displayed. Recent Labs     07/02/20  0610  07/03/20  0250 07/03/20  0555 07/04/20  0436   *   < > 136 137 140   K 3.1*   < > 4.7 4.5 4.4   *   < > 108* 105 110*   CO2 25   < > 20* 24 21*   MG 1.8  --   --  1.6 1.6   PHOS 2.9  --   --  1.6* 3.8   BUN 87*   < > 48* 51* 35*   CREATININE 1.6*   < > 0.9 1.1* 0.9   *  --   --  233* 192*   *  --   --  510* 320*   BILITOT 1.9*  --   --  1.4* 1.3*   ALKPHOS 91  --   --  98 88    < > = values in this interval not displayed. No results found for: LABPROT    ASSESSMENT / RECOMMENDATIONS:      1. Acute kidney injury due to volume depletion along with rhabdomyolysis unlikely to be due to hepatorenal syndrome as it improved significantly with crystalloid  fluid. 2.  Hepatic encephalopathy.   3.  Gastrointestinal bleed, there was concern

## 2020-07-04 NOTE — PROGRESS NOTES
Critical Care Team: Sign off           Patient transferred from the ICU. .. Please advise if ongoing pulmonary care is needed. Thanks.     Micah Coreas M.D., F.C.C.P.  [facility] Intensivist

## 2020-07-04 NOTE — CONSULTS
Reason for consult: thrombocytopenia    HPI: 48year old female with ETOH abuse states has been drinking 1 bottle of vodka every 4 days. She was admitted with GI bleed and and EGD showed ulcers. She had a platelet count of 453 and hb of 12 on admission 6-30-20 and Hb dropped down to 6.7 the next day with platelets=110. She was started on IV protonix 7-1-20 and continued on IV protonix last dose was 7-4-20. Platelets dropped to 86,000 on 7-2, 71,000 on 7-3 and 65,000 on 7-4. She has been started on oral protonix today and IV d/c'd last dose given today. Her hb is stable now at 9.1. B12 and folate normal and no iron studies. Bowels are moving and no obvious bleeding today. Allergies: PCN    MEds:  Oral protonix, glycolax    PMHX:  ETOH abuse, iron deficiency anemia    Social HX: , no children, drinking 1 bottle of vodka about every 4 days, not smoking    Fam HX: no blood issues    Physical Exam:  Gen- alert, talking, NAD  HEENT- mmm  Lungs- clear  ABd- soft, mild tenderness, mild distention +BS  Ext- warm and well perfused    Labs: today wbc=5.8 hb=9 plt=65,000 folate>20, B12>2000    A/P:  48year old female with ETOH abuse states has been drinking 1 bottle of vodka every 4 days. She was admitted with GI bleed and and EGD showed ulcers. She had a platelet count of 388 and hb of 12 on admission 6-30-20 and Hb dropped down to 6.7 the next day with platelets=110. She was started on IV protonix 7-1-20 and continued on IV protonix last dose was 7-4-20. Platelets dropped to 86,000 on 7-2, 71,000 on 7-3 and 65,000 on 7-4. She has been started on oral protonix today and IV d/c'd last dose given today. Her hb is stable now at 9.1. B12 and folate normal and no iron studies. She received 1 rbc transfusion on 7-1-20. 1. Progressive thrombocytopenia likely due to IV protonix given 7-1 through 7-4, she is not starting oral protonix tomorrow.  Expect her thrombocytopenia to gradually improve now that the IV protonix has been stopped, however, she did get a dose today so platelet counts may be lower tomorrow. 2. Check iron studies  3. Cbc daily  4.  Recommended ETOH cessation           Jamey Crowder

## 2020-07-04 NOTE — PROGRESS NOTES
Sitting up. Alert, interacts. No confusion  Stable Hct, platelets 65 (no splenomegaly)  Cr continues decline, LFTs and CK down  Hydration continues    Much improved  No ascites  Low platelets ? origin  Gastric ulcer x 3, likely small visible vessel, H pylori previously negative, reassessed with bx, no active bleeding  Rhabdomyolysis uncertain cause  WEN, seems mostly volume related, improved    PPI indefinitely with recurrence documented, No NSAID and H pylori negative  Advance diet to general.  Does the rise again in the MCV signal return to alcohol?

## 2020-07-04 NOTE — PROGRESS NOTES
care will be provided in accordance with the objectives noted above. Exercises and functional mobility practice will be used as well as appropriate assistive devices or modalities to obtain goals. Patient and family education will also be administered as needed. Frequency of treatments: 2-5x/week x 1-2 weeks. Total Treatment Time  15 minutes     Evaluation Time includes thorough review of current medical information, gathering information on past medical history/social history and prior level of function, completion of standardized testing/informal observation of tasks, assessment of data and education on plan of care and goals.     CPT codes:  [x] Low Complexity PT evaluation 60189  [] Moderate Complexity PT evaluation 66595  [] High Complexity PT evaluation 36725  [] PT Re-evaluation 36715  [] Gait training 23321 ** minutes  [] Manual therapy 04135 ** minutes  [] Therapeutic activities 82578 ** minutes  [] Therapeutic exercises 63218 ** minutes  [] Neuromuscular reeducation 11260 ** minutes    Miley Rodriguez PT

## 2020-07-05 LAB
ALBUMIN SERPL-MCNC: 2.3 G/DL (ref 3.5–5.2)
ALP BLD-CCNC: 96 U/L (ref 35–104)
ALT SERPL-CCNC: 173 U/L (ref 0–32)
ANION GAP SERPL CALCULATED.3IONS-SCNC: 9 MMOL/L (ref 7–16)
ANISOCYTOSIS: ABNORMAL
AST SERPL-CCNC: 229 U/L (ref 0–31)
BASOPHILS ABSOLUTE: 0 E9/L (ref 0–0.2)
BASOPHILS RELATIVE PERCENT: 0 % (ref 0–2)
BILIRUB SERPL-MCNC: 1.2 MG/DL (ref 0–1.2)
BUN BLDV-MCNC: 23 MG/DL (ref 6–20)
CALCIUM SERPL-MCNC: 7.7 MG/DL (ref 8.6–10.2)
CHLORIDE BLD-SCNC: 109 MMOL/L (ref 98–107)
CO2: 19 MMOL/L (ref 22–29)
CREAT SERPL-MCNC: 0.8 MG/DL (ref 0.5–1)
EOSINOPHILS ABSOLUTE: 0.13 E9/L (ref 0.05–0.5)
EOSINOPHILS RELATIVE PERCENT: 3 % (ref 0–6)
FERRITIN: 491 NG/ML
GFR AFRICAN AMERICAN: >60
GFR NON-AFRICAN AMERICAN: >60 ML/MIN/1.73
GLUCOSE BLD-MCNC: 112 MG/DL (ref 74–99)
HCT VFR BLD CALC: 27.9 % (ref 34–48)
HEMOGLOBIN: 8.9 G/DL (ref 11.5–15.5)
HYPOCHROMIA: ABNORMAL
IRON SATURATION: 61 % (ref 15–50)
IRON: 91 MCG/DL (ref 37–145)
LYMPHOCYTES ABSOLUTE: 1.32 E9/L (ref 1.5–4)
LYMPHOCYTES RELATIVE PERCENT: 30 % (ref 20–42)
MAGNESIUM: 1.4 MG/DL (ref 1.6–2.6)
MCH RBC QN AUTO: 36.5 PG (ref 26–35)
MCHC RBC AUTO-ENTMCNC: 31.9 % (ref 32–34.5)
MCV RBC AUTO: 114.3 FL (ref 80–99.9)
METAMYELOCYTES RELATIVE PERCENT: 3 % (ref 0–1)
MONOCYTES ABSOLUTE: 0.26 E9/L (ref 0.1–0.95)
MONOCYTES RELATIVE PERCENT: 6 % (ref 2–12)
NEUTROPHILS ABSOLUTE: 2.68 E9/L (ref 1.8–7.3)
NEUTROPHILS RELATIVE PERCENT: 58 % (ref 43–80)
PDW BLD-RTO: 21.5 FL (ref 11.5–15)
PHOSPHORUS: 3.4 MG/DL (ref 2.5–4.5)
PLATELET # BLD: 63 E9/L (ref 130–450)
PLATELET CONFIRMATION: NORMAL
PMV BLD AUTO: 9.9 FL (ref 7–12)
POLYCHROMASIA: ABNORMAL
POTASSIUM SERPL-SCNC: 3.9 MMOL/L (ref 3.5–5)
RBC # BLD: 2.44 E12/L (ref 3.5–5.5)
SODIUM BLD-SCNC: 137 MMOL/L (ref 132–146)
TOTAL CK: 1737 U/L (ref 20–180)
TOTAL IRON BINDING CAPACITY: 148 MCG/DL (ref 250–450)
TOTAL PROTEIN: 5.4 G/DL (ref 6.4–8.3)
WBC # BLD: 4.4 E9/L (ref 4.5–11.5)

## 2020-07-05 PROCEDURE — 83540 ASSAY OF IRON: CPT

## 2020-07-05 PROCEDURE — 6370000000 HC RX 637 (ALT 250 FOR IP): Performed by: FAMILY MEDICINE

## 2020-07-05 PROCEDURE — 83735 ASSAY OF MAGNESIUM: CPT

## 2020-07-05 PROCEDURE — 2580000003 HC RX 258: Performed by: FAMILY MEDICINE

## 2020-07-05 PROCEDURE — 2580000003 HC RX 258: Performed by: INTERNAL MEDICINE

## 2020-07-05 PROCEDURE — 36415 COLL VENOUS BLD VENIPUNCTURE: CPT

## 2020-07-05 PROCEDURE — 82550 ASSAY OF CK (CPK): CPT

## 2020-07-05 PROCEDURE — 6370000000 HC RX 637 (ALT 250 FOR IP): Performed by: INTERNAL MEDICINE

## 2020-07-05 PROCEDURE — 6360000002 HC RX W HCPCS: Performed by: INTERNAL MEDICINE

## 2020-07-05 PROCEDURE — 99231 SBSQ HOSP IP/OBS SF/LOW 25: CPT | Performed by: FAMILY MEDICINE

## 2020-07-05 PROCEDURE — 83550 IRON BINDING TEST: CPT

## 2020-07-05 PROCEDURE — 84100 ASSAY OF PHOSPHORUS: CPT

## 2020-07-05 PROCEDURE — 85025 COMPLETE CBC W/AUTO DIFF WBC: CPT

## 2020-07-05 PROCEDURE — 1200000000 HC SEMI PRIVATE

## 2020-07-05 PROCEDURE — 80053 COMPREHEN METABOLIC PANEL: CPT

## 2020-07-05 PROCEDURE — 82728 ASSAY OF FERRITIN: CPT

## 2020-07-05 RX ORDER — SODIUM CHLORIDE 9 MG/ML
INJECTION, SOLUTION INTRAVENOUS CONTINUOUS
Status: DISCONTINUED | OUTPATIENT
Start: 2020-07-05 | End: 2020-07-06

## 2020-07-05 RX ORDER — CALCIUM CARBONATE 200(500)MG
500 TABLET,CHEWABLE ORAL 3 TIMES DAILY PRN
Status: DISCONTINUED | OUTPATIENT
Start: 2020-07-05 | End: 2020-07-07 | Stop reason: HOSPADM

## 2020-07-05 RX ORDER — MAGNESIUM SULFATE 1 G/100ML
1 INJECTION INTRAVENOUS ONCE
Status: COMPLETED | OUTPATIENT
Start: 2020-07-05 | End: 2020-07-05

## 2020-07-05 RX ADMIN — Medication 10 ML: at 10:13

## 2020-07-05 RX ADMIN — MAGNESIUM SULFATE HEPTAHYDRATE 1 G: 1 INJECTION, SOLUTION INTRAVENOUS at 17:33

## 2020-07-05 RX ADMIN — Medication 10 ML: at 20:58

## 2020-07-05 RX ADMIN — SODIUM CHLORIDE: 9 INJECTION, SOLUTION INTRAVENOUS at 05:15

## 2020-07-05 RX ADMIN — CALCIUM CARBONATE 500 MG: 500 TABLET, CHEWABLE ORAL at 20:58

## 2020-07-05 RX ADMIN — CALCIUM CARBONATE 500 MG: 500 TABLET, CHEWABLE ORAL at 11:57

## 2020-07-05 RX ADMIN — SODIUM CHLORIDE: 9 INJECTION, SOLUTION INTRAVENOUS at 17:33

## 2020-07-05 RX ADMIN — PANTOPRAZOLE SODIUM 40 MG: 40 TABLET, DELAYED RELEASE ORAL at 05:15

## 2020-07-05 ASSESSMENT — PAIN SCALES - GENERAL
PAINLEVEL_OUTOF10: 0

## 2020-07-05 NOTE — PROGRESS NOTES
Pt up bathroom difficulty ambulating with walker unsteady. Assist given to pt. Pt did not eat any dinner tray tonight. Bed alarm on. Hourly rounds continued.

## 2020-07-05 NOTE — PROGRESS NOTES
Hgb stable  Have been under the impression that drinking had stopped but she admitted to  [facility] yesterday that is not correct. She did admit that to me today but attributes to stress of moving, valid quantitation not forthcoming. If platelets are felt to be secondary to current PPI, can switch but PPI preferable to H2,  Numbers continue to improve. No objection to discharge at time of your choosing.

## 2020-07-05 NOTE — PROGRESS NOTES
Physical Exam:  Temp 97.4 P-101 R-16 /77        Labs:  today wbc=4.4 hb=8.9 plt=63,iron studies pending  Yesterday wbc=5.8 hb=9 plt=65,000 folate>20, B12>2000     A/P:  48year old female with ETOH abuse states has been drinking 1 bottle of vodka every 4 days. She was admitted with GI bleed and and EGD showed ulcers. She had a platelet count of 150 and hb of 12 on admission 6-30-20 and Hb dropped down to 6.7 the next day with platelets=110. She was started on IV protonix 7-1-20 and continued on IV protonix last dose was 7-4-20. Platelets dropped to 86,000 on 7-2, 71,000 on 7-3 and 65,000 on 7-4. She has been started on oral protonix today and IV d/c'd last dose given today. Her hb is stable now at 9.1. B12 and folate normal and no iron studies. She received 1 rbc transfusion on 7-1-20. Oral protonix is usually not a problem with worsening thrombocytopenia for the most part, the IV form seems to be more commonly the issue so I think oral protonix OK to continue.     1. Progressive thrombocytopenia likely due to IV protonix given 7-1 through 7-4, she is now on oral which usually does not cause same problems with thrombocytopenia as the IV form for some reason . I expect her thrombocytopenia to gradually improve now that the IV protonix has been stopped and continue on oral protonix OK with me at this time  2. Check iron studies, Hb stable  3.  Recommended ETOH cessation    Melina Crowder

## 2020-07-05 NOTE — PROGRESS NOTES
[facility] MD Marissa Aguila MD Rutha Moon, MD. Carlos Iverson MD   Progress Note    7/5/2020    SUBJECTIVE:   On RA   euvolemic   Alert oriented x3  Lying flat in NAD     PROBLEM LIST:    Active Problems:    Hepatic encephalopathy ([address]    Moderate protein-calorie malnutrition ([address]  Resolved Problems:    * No resolved hospital problems. *       DIET:    DIET GENERAL;       Allergies : Pcn [penicillins]    Past Medical History:   Diagnosis Date    Anemia     ETOH abuse        Past Surgical History:   Procedure Laterality Date    UPPER GASTROINTESTINAL ENDOSCOPY N/A 10/21/2019    EGD BIOPSY performed by Veronica Osborn MD at [facility] ENDOSCOPY       No family history on file. reports that she has quit smoking. She has never used smokeless tobacco. She reports current alcohol use of about 5.0 standard drinks of alcohol per week. She reports that she does not use drugs. Review of Systems:   Constitutional: weak   Eyes: no eye pain , no itching , no drainage  Ears, nose, mouth, throat, and face: no ear ,nose pain , hearing is ok ,no nasal drainage   Respiratory: no sob ,no cough ,no wheezing . Cardiovascular: no chest pain , no palpitation ,no sob . Gastrointestinal: no nausea, vomiting , constipation , no abdominal pain . Genitourinary:no urinary retention , no burning , dysuria . No polyuria   Hematologic/lymphatic: no bleeding , no cougulation issues . Musculoskeletal:no joint pain , no swelling . Neurological: no headaches ,no weakness , no numbness . Endocrine: no thirst , no weight issues .      MEDS (scheduled):    magnesium sulfate  1 g Intravenous Once    pantoprazole  40 mg Oral QAM AC    sodium chloride flush  10 mL Intravenous 2 times per day       MEDS (infusions):      MEDS (prn):  calcium carbonate, sodium chloride flush, acetaminophen **OR** acetaminophen, polyethylene glycol, prochlorperazine    PHYSICAL EXAM:     Patient Vitals for the past 24 hrs:   BP Temp Temp src Pulse Resp SpO2   07/05/20 1215 114/78 98.3 °F (36.8 °C) Oral 110 16 --   07/05/20 0730 126/77 97.4 °F (36.3 °C) Oral 101 16 100 %   07/04/20 1930 115/73 98 °F (36.7 °C) Oral 94 16 --   @      Intake/Output Summary (Last 24 hours) at 7/5/2020 1610  Last data filed at 7/5/2020 0515  Gross per 24 hour   Intake 400 ml   Output 500 ml   Net -100 ml         Wt Readings from Last 3 Encounters:   07/03/20 109 lb 5.6 oz (49.6 kg)   10/20/19 100 lb (45.4 kg)   12/23/17 100 lb (45.4 kg)       Constitutional:  in no acute distress  Oral: mucus membranes moist  Neck: no JVD  Cardiovascular: S1, S2 regular rhythm, no murmur,or rub  Respiratory:  No crackles, no wheeze  Gastrointestinal:  Soft, nontender, nondistended, NABS  Ext: no edema, feet warm  Skin: dry, no rash  Neuro: awake, alert, interactive      DATA:    Recent Labs     07/03/20  0555 07/04/20  0436 07/05/20  0325   WBC 7.3 5.8 4.4*   HGB 8.5* 9.1* 8.9*   HCT 25.0* 27.4* 27.9*   .7* 111.8* 114.3*   PLT 71* 65* 63*     Recent Labs     07/03/20  0555 07/04/20  0436 07/05/20  0325    140 137   K 4.5 4.4 3.9    110* 109*   CO2 24 21* 19*   MG 1.6 1.6 1.4*   PHOS 1.6* 3.8 3.4   BUN 51* 35* 23*   CREATININE 1.1* 0.9 0.8   * 192* 173*   * 320* 229*   BILITOT 1.4* 1.3* 1.2   ALKPHOS 98 88 96       No results found for: LABPROT    ASSESSMENT / RECOMMENDATIONS:      1. Acute kidney injury due to volume depletion along with rhabdomyolysis unlikely to be due to hepatorenal syndrome as it improved significantly with crystalloid  fluid. 2.  Hepatic encephalopathy. 3.  Gastrointestinal bleed, there was concern for peptic ulcer disease. 4.  Alcohol abuse  5. Hypokalemia.     PLAN:    neri resolved   CK trending down   No tender spots .  Her rhabdomyolysis is likely in contest of alcohol drinking   Continue to look clinially euvolemic       Recommendation :     Continue iv fluids in form of NS at 100 cc/hr till ck <500  Replace Mg   Abstain from drinking   Am labs    Electronically signed by Shira Hillman MD on 7/5/2020 at 4:10 PM

## 2020-07-05 NOTE — PROGRESS NOTES
Admit Date: 6/30/2020       Subjective:  Chief Complaint   Patient presents with    Extremity Weakness     Pt has progressively worsening weakness since this weekend. Hx of anemia. Denies recent obvious bleeding. She is awake and alert. Conversational.  No pain.  IV fluids running    hgb 9.1,  Was 7.8 , plate 65 was 940,  K+ low again 3.1  Bun/cr  35/0.9 improving  Bili decreased to 1.3,  ast 328  Ammonia down to 25  Ck down to 1600    Iv fluids continued  Monitor labs   K+ replacement per intensivist             Objective:    Scheduled Meds:  Current Facility-Administered Medications   Medication Dose Route Frequency Provider Last Rate Last Dose    calcium carbonate (TUMS) chewable tablet 500 mg  500 mg Oral TID PRN Rose Lockett MD        pantoprazole (PROTONIX) tablet 40 mg  40 mg Oral QAM AC Veronica Osborn MD   40 mg at 07/05/20 0515    0.9 % sodium chloride infusion   Intravenous Continuous Rose Lockett MD 75 mL/hr at 07/05/20 0515      sodium chloride flush 0.9 % injection 10 mL  10 mL Intravenous 2 times per day Lety Green MD   10 mL at 07/05/20 1013    sodium chloride flush 0.9 % injection 10 mL  10 mL Intravenous PRN Lety Green MD        acetaminophen (TYLENOL) tablet 650 mg  650 mg Oral Q6H PRN Lety Green MD        Or    acetaminophen (TYLENOL) suppository 650 mg  650 mg Rectal Q6H PRN Lety Green MD        polyethylene glycol (GLYCOLAX) packet 17 g  17 g Oral Daily PRN Lety Green MD        prochlorperazine (COMPAZINE) injection 10 mg  10 mg Intravenous Q6H PRN Lety Green MD   10 mg at 07/02/20 2019       Continuous Infusions  :   sodium chloride 75 mL/hr at 07/05/20 0515       PRN Meds:  calcium carbonate, sodium chloride flush, acetaminophen **OR** acetaminophen, polyethylene glycol, prochlorperazine      Wt Readings from Last 3 Encounters:   07/03/20 109 lb 5.6 oz (49.6 kg)   10/20/19 100 lb (45.4 kg)   12/23/17 100 lb (45.4 kg) I/O last 3 completed shifts: In: 400 [P.O.:400]  Out: 500 [Urine:500]    Intake/Output Summary (Last 24 hours) at 7/5/2020 1146  Last data filed at 7/5/2020 0515  Gross per 24 hour   Intake 400 ml   Output 500 ml   Net -100 ml       Vitals:    07/05/20 0730   BP: 126/77   Pulse: 101   Resp: 16   Temp: 97.4 °F (36.3 °C)   SpO2: 100%       Physical Exam:                   General appearance - oriented to person, place, and time and ill-appearing  Mental status - inappropriate safety awareness  Chest - clear to auscultation, no wheezes, rales or rhonchi, symmetric air entry  Heart - normal rate, regular rhythm, normal S1, S2, no murmurs, rubs, clicks or gallops  Abdomen - soft, nontender, nondistended, no masses or organomegaly  }  Extremities - peripheral pulses normal, no pedal edema, no clubbing or cyanosis  Skin - normal coloration and turgor, no rashes, no suspicious skin lesions noted. Sl jaundice                           CBC  Recent Labs     07/03/20 0555 07/04/20 0436 07/05/20  0325   WBC 7.3 5.8 4.4*   HGB 8.5* 9.1* 8.9*   HCT 25.0* 27.4* 27.9*   .7* 111.8* 114.3*   PLT 71* 65* 63*     BMP  Recent Labs     07/03/20 0555 07/04/20  0436 07/05/20  0325    140 137   K 4.5 4.4 3.9    110* 109*   CO2 24 21* 19*   BUN 51* 35* 23*   CREATININE 1.1* 0.9 0.8   LABGLOM 52 >60 >60   CALCIUM 7.9* 7.8* 7.7*     LFT's  Recent Labs     07/03/20  0555 07/04/20  0436 07/05/20  0325   * 192* 173*     INR:   No results for input(s): INR in the last 72 hours. No results found for: CRP  No results found for: SEDRATE  No results for input(s): POCGLU in the last 72 hours. Lipids: No results for input(s): CHOL, HDL in the last 72 hours.     Invalid input(s): LDLCALCU  ABGs: No results found for: PHART, PO2ART, [id number]  SpO2 Readings from Last 1 Encounters:   07/05/20 100%       Last 3 Troponin:    Lab Results   Component Value Date    TROPONINI 0.08 07/02/2020    TROPONINI 0.09 07/02/2020

## 2020-07-05 NOTE — PLAN OF CARE
Problem: Falls - Risk of:  Goal: Will remain free from falls  Description: Will remain free from falls  7/5/2020 1044 by Bi Balderas RN  Outcome: Ongoing  7/5/2020 0426 by Loyd Mccallum RN  Outcome: Met This Shift

## 2020-07-05 NOTE — PROGRESS NOTES
[facility] Quality Flow/Interdisciplinary Rounds Progress Note        Quality Flow Rounds held on July 5, 2020    Disciplines Attending:  Bedside Nurse, ,  and Nursing Unit Leadership    Otilio Multani was admitted on 6/30/2020 10:34 PM    Anticipated Discharge Date:  Expected Discharge Date: 07/04/20    Disposition:    Jaleel Score:  Jaleel Scale Score: 18    Readmission Risk              Risk of Unplanned Readmission:        21           Discussed patient goal for the day, patient clinical progression, and barriers to discharge.   The following Goal(s) of the Day/Commitment(s) have been identified:  monitor labs      Lendon Mole  July 5, 2020

## 2020-07-06 LAB
ALBUMIN SERPL-MCNC: 2.8 G/DL (ref 3.5–5.2)
ALP BLD-CCNC: 107 U/L (ref 35–104)
ALT SERPL-CCNC: 173 U/L (ref 0–32)
ANION GAP SERPL CALCULATED.3IONS-SCNC: 12 MMOL/L (ref 7–16)
ANISOCYTOSIS: ABNORMAL
AST SERPL-CCNC: 192 U/L (ref 0–31)
BASOPHILS ABSOLUTE: 0.03 E9/L (ref 0–0.2)
BASOPHILS RELATIVE PERCENT: 0.6 % (ref 0–2)
BILIRUB SERPL-MCNC: 1.3 MG/DL (ref 0–1.2)
BLOOD CULTURE, ROUTINE: NORMAL
BUN BLDV-MCNC: 12 MG/DL (ref 6–20)
BURR CELLS: ABNORMAL
CALCIUM SERPL-MCNC: 7.7 MG/DL (ref 8.6–10.2)
CHLORIDE BLD-SCNC: 110 MMOL/L (ref 98–107)
CO2: 16 MMOL/L (ref 22–29)
CREAT SERPL-MCNC: 0.7 MG/DL (ref 0.5–1)
CULTURE, BLOOD 2: NORMAL
EOSINOPHILS ABSOLUTE: 0.07 E9/L (ref 0.05–0.5)
EOSINOPHILS RELATIVE PERCENT: 1.4 % (ref 0–6)
GFR AFRICAN AMERICAN: >60
GFR NON-AFRICAN AMERICAN: >60 ML/MIN/1.73
GLUCOSE BLD-MCNC: 110 MG/DL (ref 74–99)
HCT VFR BLD CALC: 27.8 % (ref 34–48)
HEMOGLOBIN: 8.7 G/DL (ref 11.5–15.5)
IMMATURE GRANULOCYTES #: 0.2 E9/L
IMMATURE GRANULOCYTES %: 4 % (ref 0–5)
LYMPHOCYTES ABSOLUTE: 1.12 E9/L (ref 1.5–4)
LYMPHOCYTES RELATIVE PERCENT: 22.2 % (ref 20–42)
MAGNESIUM: 1.6 MG/DL (ref 1.6–2.6)
MCH RBC QN AUTO: 36.9 PG (ref 26–35)
MCHC RBC AUTO-ENTMCNC: 31.3 % (ref 32–34.5)
MCV RBC AUTO: 117.8 FL (ref 80–99.9)
MONOCYTES ABSOLUTE: 0.48 E9/L (ref 0.1–0.95)
MONOCYTES RELATIVE PERCENT: 9.5 % (ref 2–12)
NEUTROPHILS ABSOLUTE: 3.15 E9/L (ref 1.8–7.3)
NEUTROPHILS RELATIVE PERCENT: 62.3 % (ref 43–80)
PDW BLD-RTO: 20.8 FL (ref 11.5–15)
PHOSPHORUS: 3.6 MG/DL (ref 2.5–4.5)
PLATELET # BLD: 75 E9/L (ref 130–450)
PLATELET CONFIRMATION: NORMAL
PMV BLD AUTO: 10.2 FL (ref 7–12)
POIKILOCYTES: ABNORMAL
POTASSIUM SERPL-SCNC: 3.6 MMOL/L (ref 3.5–5)
RBC # BLD: 2.36 E12/L (ref 3.5–5.5)
SODIUM BLD-SCNC: 138 MMOL/L (ref 132–146)
TOTAL PROTEIN: 6.3 G/DL (ref 6.4–8.3)
WBC # BLD: 5.1 E9/L (ref 4.5–11.5)

## 2020-07-06 PROCEDURE — 1200000000 HC SEMI PRIVATE

## 2020-07-06 PROCEDURE — 2580000003 HC RX 258: Performed by: INTERNAL MEDICINE

## 2020-07-06 PROCEDURE — 80053 COMPREHEN METABOLIC PANEL: CPT

## 2020-07-06 PROCEDURE — 36415 COLL VENOUS BLD VENIPUNCTURE: CPT

## 2020-07-06 PROCEDURE — 6370000000 HC RX 637 (ALT 250 FOR IP): Performed by: INTERNAL MEDICINE

## 2020-07-06 PROCEDURE — 85025 COMPLETE CBC W/AUTO DIFF WBC: CPT

## 2020-07-06 PROCEDURE — 2500000003 HC RX 250 WO HCPCS: Performed by: INTERNAL MEDICINE

## 2020-07-06 PROCEDURE — 84100 ASSAY OF PHOSPHORUS: CPT

## 2020-07-06 PROCEDURE — 99231 SBSQ HOSP IP/OBS SF/LOW 25: CPT | Performed by: FAMILY MEDICINE

## 2020-07-06 PROCEDURE — 83735 ASSAY OF MAGNESIUM: CPT

## 2020-07-06 PROCEDURE — 97165 OT EVAL LOW COMPLEX 30 MIN: CPT

## 2020-07-06 PROCEDURE — 6360000002 HC RX W HCPCS: Performed by: INTERNAL MEDICINE

## 2020-07-06 RX ORDER — MAGNESIUM SULFATE 1 G/100ML
1 INJECTION INTRAVENOUS ONCE
Status: COMPLETED | OUTPATIENT
Start: 2020-07-06 | End: 2020-07-06

## 2020-07-06 RX ADMIN — SODIUM BICARBONATE: 84 INJECTION, SOLUTION INTRAVENOUS at 17:55

## 2020-07-06 RX ADMIN — PANTOPRAZOLE SODIUM 40 MG: 40 TABLET, DELAYED RELEASE ORAL at 06:42

## 2020-07-06 RX ADMIN — MAGNESIUM SULFATE HEPTAHYDRATE 1 G: 1 INJECTION, SOLUTION INTRAVENOUS at 16:50

## 2020-07-06 ASSESSMENT — PAIN SCALES - GENERAL
PAINLEVEL_OUTOF10: 0
PAINLEVEL_OUTOF10: 0

## 2020-07-06 NOTE — CARE COORDINATION
Met with patient at bedside, introduced myself and explained my role as RN case manager. Discussed plan of care (Gi consult, Renal consult, labs/testing, medications, discharge planning, etc.). Pt verbalized understanding. Inquired about pt's drinking- Pt stated, \"Oh I haven't drank in 5 or 6 days. \" Inquired if patient would still be drinking if she was not admitted to the hospital she stated yes. Pt stated that she typically drinks \"one glass of champagne and a couple shots of vodka a day. \" Pt stated that she has cut down on her drinking because she has not been as stressed out lately. Inquired if patient wanted any resources for Alcohol rehab. Pt declines the want/need for inpatient alcohol rehab. Discussed [facility]- pt is open to speak to [facility]. Pt stated that when she is medically stable for discharge she plans to return home with her  and dogs. She noted that she and her  have been staying at an extended stay hotel in the area because they are in the process of buying a new home. Referral made to Angeline at [facility]- information and demographics given. She stated that [facility] will be available tomorrow and she will pass on the referral to her.      Informed patient that case management and social work will continue to follow to assist with the transition of care

## 2020-07-06 NOTE — PROGRESS NOTES
[facility] MD Cary Davis MD Augustin Hew, MD. Haylee Wick MD   Progress Note    7/6/2020    SUBJECTIVE:   On RA   euvolemic   Alert oriented x3  Lying flat in NAD     PROBLEM LIST:    Active Problems:    Hepatic encephalopathy ([address]    Moderate protein-calorie malnutrition ([address]  Resolved Problems:    * No resolved hospital problems. *       DIET:    DIET GENERAL;       Allergies : Pcn [penicillins]    Past Medical History:   Diagnosis Date    Anemia     ETOH abuse        Past Surgical History:   Procedure Laterality Date    UPPER GASTROINTESTINAL ENDOSCOPY N/A 10/21/2019    EGD BIOPSY performed by Kiki Moore MD at [address] [place] [address] N/A 7/3/2020    EGD BIOPSY performed by Kiki Moore MD at [place] [address]  7/3/2020    EGD CONTROL HEMORRHAGE performed by Kiki Moore MD at [address] [place] [address]       No family history on file. reports that she has quit smoking. She has never used smokeless tobacco. She reports current alcohol use of about 5.0 standard drinks of alcohol per week. She reports that she does not use drugs. Review of Systems:   Constitutional: weak   Eyes: no eye pain , no itching , no drainage  Ears, nose, mouth, throat, and face: no ear ,nose pain , hearing is ok ,no nasal drainage   Respiratory: no sob ,no cough ,no wheezing . Cardiovascular: no chest pain , no palpitation ,no sob . Gastrointestinal: no nausea, vomiting , constipation , no abdominal pain . Genitourinary:no urinary retention , no burning , dysuria . No polyuria   Hematologic/lymphatic: no bleeding , no cougulation issues . Musculoskeletal:no joint pain , no swelling . Neurological: no headaches ,no weakness , no numbness . Endocrine: no thirst , no weight issues .      MEDS (scheduled):    pantoprazole  40 mg Oral QAM AC    sodium chloride flush  10 mL Intravenous 2 times per day       MEDS tender spots .  Her rhabdomyolysis is likely in contest of alcohol drinking   Continue to look clinially euvolemic       Recommendation :     Continue iv fluids in form of NS at 100 cc/hr till ck <500  Replace Mg   Abstain from drinking   Am labs    Electronically signed by Gita Gomes MD on 7/6/2020 at 6:08 PM

## 2020-07-06 NOTE — PROGRESS NOTES
completion of ADL, safe functional transfers and functional mobility. Patient required cues for follow through with proper hand/foot placement, pacing, safety, attention, sequencing and technique in bed mobility, functional transfers, functional mobility, toileting, grooming and LB dressing in preparation for maximum independence in all self care tasks. Hand Dominance: Right []  Left []   Strength ROM Additional Info:    RUE  4-/5 WFL good  and FMC/dexterity noted during ADL tasks     LUE 4-/5 WFL good  and FMC/dexterity noted during ADL tasks         Hearing: WFL   Vision: WFL   Sensation:  No c/o numbness or tingling   Tone: WFL                             Long Term Goal (1-3 wks): Pt will maximize functional performance in all self care tasks/functional transfers with good follow through of all trained techniques for safe transition to next level of care    Eval Complexity: Low    Assessment of current deficits   Functional mobility [x]  ADLs [x] Strength [x]  Cognition []  Functional transfers  [x] IADLs [x] Safety Awareness [x]  Endurance [x]  Fine Motor Coordination [] Balance [x] Vision/perception [] Sensation []   Gross Motor Coordination [] ROM [] Delirium []                  Motor Control []    Plan of Care:   ADL retraining [x]   Equipment needs [x]   Neuromuscular re-education [x] Energy Conservation Techniques [x]  Functional Transfer training [x] Patient and/or Family Education [x]  Functional Mobility training [x]  Environmental Modifications [x]  Cognitive re-training []   Compensatory techniques for ADLs [x]  Splinting Needs []   Positioning to improve overall function [x]   Therapeutic Activity [x]  Therapeutic Exercise  [x]  Visual/Perceptual: []    Delirium prevention/treatment  []   Other:  []    Rehab Potential: Good for established goals     Patient / Family Goal: To get home. Patient and/or family were instructed on functional diagnosis, prognosis/goals and OT plan of care. Pt verbalized fair understanding. Upon arrival, patient seated on commode. At end of session, patient supine in bed per pt request with call light and phone within reach, all lines and tubes intact. Pt would benefit from continued skilled OT to increase safety and independence with completion of ADL/IADL tasks for functional independence and quality of life.  Bed/chair alarm: ON    Low Evaluation + 0 timed treatment minutes    Evaluation time includes thorough review of current medical information, gathering information on past medical history/social history and prior level of function, completion of standardized testing/informal observation of tasks, assessment of data, and development of POC/Goals    Jacky Dennison OTR/L  [id number]

## 2020-07-06 NOTE — PROGRESS NOTES
3 completed shifts:  In: -   Out: 1050 [Urine:1050]    Intake/Output Summary (Last 24 hours) at 7/6/2020 0743  Last data filed at 7/6/2020 0547  Gross per 24 hour   Intake --   Output 1050 ml   Net -1050 ml       Vitals:    07/05/20 2052   BP: 115/60   Pulse: 84   Resp: 16   Temp: 98.3 °F (36.8 °C)   SpO2: 100%       Physical Exam:                   General appearance - oriented to person, place, and time and ill-appearing  Mental status - inappropriate safety awareness  Chest - clear to auscultation, no wheezes, rales or rhonchi, symmetric air entry  Heart - normal rate, regular rhythm, normal S1, S2, no murmurs, rubs, clicks or gallops  Abdomen - soft, nontender, nondistended, no masses or organomegaly  }  Extremities - peripheral pulses normal, no pedal edema, no clubbing or cyanosis  Skin - normal coloration and turgor, no rashes, no suspicious skin lesions noted. Sl jaundice                           CBC  Recent Labs     07/04/20  0436 07/05/20  0325   WBC 5.8 4.4*   HGB 9.1* 8.9*   HCT 27.4* 27.9*   .8* 114.3*   PLT 65* 63*     BMP  Recent Labs     07/04/20  0436 07/05/20  0325    137   K 4.4 3.9   * 109*   CO2 21* 19*   BUN 35* 23*   CREATININE 0.9 0.8   LABGLOM >60 >60   CALCIUM 7.8* 7.7*     LFT's  Recent Labs     07/04/20  0436 07/05/20  0325   * 173*     INR:   No results for input(s): INR in the last 72 hours. No results found for: CRP  No results found for: SEDRATE  No results for input(s): POCGLU in the last 72 hours. Lipids: No results for input(s): CHOL, HDL in the last 72 hours.     Invalid input(s): LDLCALCU  ABGs: No results found for: PHART, PO2ART, NLQ0PFZ  SpO2 Readings from Last 1 Encounters:   07/05/20 100%       Last 3 Troponin:    Lab Results   Component Value Date    TROPONINI 0.08 07/02/2020    TROPONINI 0.09 07/02/2020    TROPONINI 0.07 07/01/2020     Lab Results   Component Value Date    CKTOTAL 1,737 (H) 07/05/2020    CKTOTAL 1,608 (H) 07/04/2020 CKTOTAL 2,784 (H) 07/03/2020    TROPONINI 0.08 (H) 07/02/2020    TROPONINI 0.09 (H) 07/02/2020    TROPONINI 0.07 (H) 07/01/2020        TSH:    Lab Results   Component Value Date    TSH 5.760 10/17/2019      Vent Information  SpO2: 100 %      U/A:     Lab Results   Component Value Date    COLORU Yellow 07/01/2020    PHUR 6.0 07/01/2020    WBCUA 10-20 07/01/2020    RBCUA 2-5 07/01/2020    BACTERIA MANY 07/01/2020    CLARITYU SL CLOUDY 07/01/2020    SPECGRAV 1.015 07/01/2020    LEUKOCYTESUR LARGE 07/01/2020    UROBILINOGEN 0.2 07/01/2020    BILIRUBINUR SMALL 07/01/2020    BLOODU LARGE 07/01/2020    GLUCOSEU Negative 07/01/2020          Iron studies:  Lab Results   Component Value Date    FERRITIN 491 07/05/2020     Bone disease:  Lab Results   Component Value Date    MG 1.4 (L) 07/05/2020    PHOS 3.4 07/05/2020     Nutrition:No results found for: ALB           Assessment:  Patient Active Problem List   Diagnosis Code    Anemia D64.9    Iron deficiency anemia due to chronic blood loss D50.0    Anemia due to GI blood loss D50.0    Hepatic encephalopathy (HCC) K72.90    Moderate protein-calorie malnutrition (HCC) E44.0          1. Hepatic encephalopathy              Ammonia 130 , 189 now 25               lactulose     2. . GI bleed              Melanotic stools              hgb 8      3. Acute blood loss anemia     4. Macrocytosis     5. Alcoholic liver disease     6.  neri              Severe dehydration                7.  Metabolic acidosis              Anion gap 32              Low bicarb     8.  uti     9.  Elevated ck   Renal consult    10.  gallstones    11.  prepyloric antrum, there were  three small 5 to 6 mm gastric ulcers with benign features.   Vessel clipped x2       Plan:    Cont present care  Rehab options    Latosha Sprague M.D.    7/6/2020

## 2020-07-06 NOTE — PROGRESS NOTES
Subjective: The patient is awake and alert in bed, complaining of mid epigastric pain, was given tums without much relief this morning. She denies any problems swallowing. She is eating and drinking well. No fever or chills. Denies GI or  blood loss. No problems overnight. Denies chest pain, angina, dyspnea or abdominal discomfort. No nausea or vomiting. Tolerating diet. Objective:    /63   Pulse 114   Temp 97.6 °F (36.4 °C) (Oral)   Resp 16   Ht 5' (1.524 m)   Wt 109 lb 5.6 oz (49.6 kg)   SpO2 100%   BMI 21.36 kg/m²     General: NAD  HEENT: No thrush or mucositis, EOMI, PERRLA  Heart:  Sinus tachy, no murmurs, gallops, or rubs.   Lungs:  CTA bilaterally, no wheeze, rales or rhonchi  Abd: bowel sounds present, nontender, nondistended, no masses  Extrem:  No clubbing, cyanosis, or edema  Lymphatics: No palpable adenopathy in cervical and supraclavicular regions  Skin: Intact, no petechia or purpura    CBC with Differential:    Lab Results   Component Value Date    WBC 5.1 07/06/2020    RBC 2.36 07/06/2020    HGB 8.7 07/06/2020    HCT 27.8 07/06/2020    PLT 75 07/06/2020    .8 07/06/2020    MCH 36.9 07/06/2020    MCHC 31.3 07/06/2020    RDW 20.8 07/06/2020    NRBC 0.0 07/04/2020    METASPCT 3.0 07/05/2020    LYMPHOPCT 22.2 07/06/2020    MONOPCT 9.5 07/06/2020    MYELOPCT 3.6 07/04/2020    BASOPCT 0.6 07/06/2020    MONOSABS 0.48 07/06/2020    LYMPHSABS 1.12 07/06/2020    EOSABS 0.07 07/06/2020    BASOSABS 0.03 07/06/2020     CMP:    Lab Results   Component Value Date     07/06/2020    K 3.6 07/06/2020    K 3.1 06/30/2020     07/06/2020    CO2 16 07/06/2020    BUN 12 07/06/2020    CREATININE 0.7 07/06/2020    GFRAA >60 07/06/2020    LABGLOM >60 07/06/2020    GLUCOSE 110 07/06/2020    PROT 6.3 07/06/2020    LABALBU 2.8 07/06/2020    CALCIUM 7.7 07/06/2020    BILITOT 1.3 07/06/2020    ALKPHOS 107 07/06/2020     07/06/2020     07/06/2020        Current Facility-Administered Medications:     calcium carbonate (TUMS) chewable tablet 500 mg, 500 mg, Oral, TID PRN, Rodney Dela Cruz MD, 500 mg at 07/05/20 2058    0.9 % sodium chloride infusion, , Intravenous, Continuous, Micehlle Osorio MD, Last Rate: 125 mL/hr at 07/05/20 1733    pantoprazole (PROTONIX) tablet 40 mg, 40 mg, Oral, QAM AC, Kelly Arnett MD, 40 mg at 07/06/20 4277    sodium chloride flush 0.9 % injection 10 mL, 10 mL, Intravenous, 2 times per day, Jennifer Barnes MD, 10 mL at 07/05/20 2058    sodium chloride flush 0.9 % injection 10 mL, 10 mL, Intravenous, PRN, Jennifer Barnes MD    acetaminophen (TYLENOL) tablet 650 mg, 650 mg, Oral, Q6H PRN **OR** acetaminophen (TYLENOL) suppository 650 mg, 650 mg, Rectal, Q6H PRN, Jennifer Barnes MD    polyethylene glycol (GLYCOLAX) packet 17 g, 17 g, Oral, Daily PRN, Jennifer Barnes MD    prochlorperazine (COMPAZINE) injection 10 mg, 10 mg, Intravenous, Q6H PRN, Jennifer Barnes MD, 10 mg at 07/02/20 2019    Assessment:    Active Problems:    Hepatic encephalopathy (HCC)    Moderate protein-calorie malnutrition ([facility]  Resolved Problems:    * No resolved hospital problems. *    51 year old female with hx of ETOH abuse. Was admitted with GI bleed, had EGD that showed ulcers. Labs were normal on admission, her counts did job 1 day after admission. She was started on IV protonix 7/1/20 and continued on IV protonix last dose was 7/4/2020. Platelets dropped to 86k on 7/2, 71k on 7-3 and 65k on 7/4. She was started on oral Protonix 6/5 and IV discontinued as this can contribute to thrombocytopenia. Patient was assessed for nutritional deficiencies which were unremarkable.       Plan:  Monitor CBC daily.    Thrombocytopenia likely secondary to IV Protonix given 7/1-7/4, was switched to oral Protonix  No indication for transfusion today  Full supportive care  Will continue to follow    Electronically signed by RONNIE Bill NP on 7/6/2020 at 1:23 PM

## 2020-07-06 NOTE — PROGRESS NOTES
Admit Date: 6/30/2020       Subjective:  Chief Complaint   Patient presents with    Extremity Weakness     Pt has progressively worsening weakness since this weekend. Hx of anemia. Denies recent obvious bleeding. She is awake and alert.  Conversational.  Up eating breakfast  Does admit to drinking but not a s much as last time but still vague on amount.    hgb 8.7 , platelets increased to 75  Has mag infusion going per renal level 1.6  Was 1.4 yesterday    Iv fluids stopped  Monitor labs       Soc serv to help with rehab options for etoh       Objective:    Scheduled Meds:  Current Facility-Administered Medications   Medication Dose Route Frequency Provider Last Rate Last Dose    calcium carbonate (TUMS) chewable tablet 500 mg  500 mg Oral TID PRN Maya Snow MD   500 mg at 07/05/20 2058    0.9 % sodium chloride infusion   Intravenous Continuous Tristan Cho  mL/hr at 07/05/20 1733      pantoprazole (PROTONIX) tablet 40 mg  40 mg Oral QAM AC Hortencia Box MD   40 mg at 07/06/20 1057    sodium chloride flush 0.9 % injection 10 mL  10 mL Intravenous 2 times per day Mamadou Carpio MD   10 mL at 07/05/20 2058    sodium chloride flush 0.9 % injection 10 mL  10 mL Intravenous PRN Mamadou Carpio MD        acetaminophen (TYLENOL) tablet 650 mg  650 mg Oral Q6H PRN Mamadou Carpio MD        Or    acetaminophen (TYLENOL) suppository 650 mg  650 mg Rectal Q6H PRN Mamadou Carpio MD        polyethylene glycol (GLYCOLAX) packet 17 g  17 g Oral Daily PRN Mamadou Carpio MD        prochlorperazine (COMPAZINE) injection 10 mg  10 mg Intravenous Q6H PRN Mamadou Crapio MD   10 mg at 07/02/20 2019       Continuous Infusions  :   sodium chloride 125 mL/hr at 07/05/20 1733       PRN Meds:  calcium carbonate, sodium chloride flush, acetaminophen **OR** acetaminophen, polyethylene glycol, prochlorperazine      Wt Readings from Last 3 Encounters:   07/03/20 109 lb 5.6 oz (49.6 kg)   10/20/19 100 lb (45.4 kg)   12/23/17 100 lb (45.4 kg)     I/O last 3 completed shifts:  In: -   Out: 1050 [Urine:1050]    Intake/Output Summary (Last 24 hours) at 7/6/2020 1314  Last data filed at 7/6/2020 0547  Gross per 24 hour   Intake --   Output 1050 ml   Net -1050 ml       Vitals:    07/06/20 0830   BP: 107/63   Pulse: 114   Resp: 16   Temp: 97.6 °F (36.4 °C)   SpO2: 100%       Physical Exam:                   General appearance - oriented to person, place, and time and ill-appearing  Mental status - inappropriate safety awareness  Chest - clear to auscultation, no wheezes, rales or rhonchi, symmetric air entry  Heart - normal rate, regular rhythm, normal S1, S2, no murmurs, rubs, clicks or gallops  Abdomen - soft, nontender, nondistended, no masses or organomegaly  }  Extremities - peripheral pulses normal, no pedal edema, no clubbing or cyanosis  Skin - normal coloration and turgor, no rashes, no suspicious skin lesions noted. Sl jaundice                           CBC  Recent Labs     07/04/20 0436 07/05/20 0325 07/06/20  0925   WBC 5.8 4.4* 5.1   HGB 9.1* 8.9* 8.7*   HCT 27.4* 27.9* 27.8*   .8* 114.3* 117.8*   PLT 65* 63* 75*     BMP  Recent Labs     07/04/20 0436 07/05/20 0325 07/06/20  0925    137 138   K 4.4 3.9 3.6   * 109* 110*   CO2 21* 19* 16*   BUN 35* 23* 12   CREATININE 0.9 0.8 0.7   LABGLOM >60 >60 >60   CALCIUM 7.8* 7.7* 7.7*     LFT's  Recent Labs     07/04/20 0436 07/05/20  0325 07/06/20  0925   * 173* 173*     INR:   No results for input(s): INR in the last 72 hours. No results found for: CRP  No results found for: SEDRATE  No results for input(s): POCGLU in the last 72 hours. Lipids: No results for input(s): CHOL, HDL in the last 72 hours.     Invalid input(s): LDLCALCU  ABGs: No results found for: PHART, PO2ART, [id number]  SpO2 Readings from Last 1 Encounters:   07/06/20 100%       Last 3 Troponin:    Lab Results   Component Value Date    TROPONINI 0.08 07/02/2020 TROPONINI 0.09 07/02/2020    TROPONINI 0.07 07/01/2020     Lab Results   Component Value Date    CKTOTAL 1,737 (H) 07/05/2020    CKTOTAL 1,608 (H) 07/04/2020    CKTOTAL 2,784 (H) 07/03/2020    TROPONINI 0.08 (H) 07/02/2020    TROPONINI 0.09 (H) 07/02/2020    TROPONINI 0.07 (H) 07/01/2020        TSH:    Lab Results   Component Value Date    TSH 5.760 10/17/2019      Vent Information  SpO2: 100 %      U/A:     Lab Results   Component Value Date    COLORU Yellow 07/01/2020    PHUR 6.0 07/01/2020    WBCUA 10-20 07/01/2020    RBCUA 2-5 07/01/2020    BACTERIA MANY 07/01/2020    CLARITYU SL CLOUDY 07/01/2020    SPECGRAV 1.015 07/01/2020    LEUKOCYTESUR LARGE 07/01/2020    UROBILINOGEN 0.2 07/01/2020    BILIRUBINUR SMALL 07/01/2020    BLOODU LARGE 07/01/2020    GLUCOSEU Negative 07/01/2020          Iron studies:  Lab Results   Component Value Date    FERRITIN 491 07/05/2020     Bone disease:  Lab Results   Component Value Date    MG 1.6 07/06/2020    PHOS 3.6 07/06/2020     Nutrition:No results found for: ALB           Assessment:  Patient Active Problem List   Diagnosis Code    Anemia D64.9    Iron deficiency anemia due to chronic blood loss D50.0    Anemia due to GI blood loss D50.0    Hepatic encephalopathy (HCC) K72.90    Moderate protein-calorie malnutrition (HCC) E44.0          1. Hepatic encephalopathy              Ammonia 130 , 189 now 25               lactulose     2. . GI bleed              Melanotic stools              hgb 8      3. Acute blood loss anemia     4. Macrocytosis     5. Alcoholic liver disease     6.  neri              Severe dehydration                7.  Metabolic acidosis              Anion gap 32              Low bicarb     8.  uti     9.  Elevated ck   Renal consult    10.  gallstones    11.  prepyloric antrum, there were  three small 5 to 6 mm gastric ulcers with benign features.   Vessel clipped x2       Plan:    Cont present care  Rehab options    Maya Snow M.D.    7/6/2020

## 2020-07-06 NOTE — PLAN OF CARE
Problem: Falls - Risk of:  Goal: Will remain free from falls  Description: Will remain free from falls  7/6/2020 0934 by Ezra Govea RN  Outcome: Met This Shift     Problem: Falls - Risk of:  Goal: Absence of physical injury  Description: Absence of physical injury  Outcome: Met This Shift     Problem: Skin Integrity:  Goal: Will show no infection signs and symptoms  Description: Will show no infection signs and symptoms  7/6/2020 0934 by Ezra Govea RN  Outcome: Met This Shift     Problem: Skin Integrity:  Goal: Absence of new skin breakdown  Description: Absence of new skin breakdown  Outcome: Met This Shift     Problem: Bleeding:  Goal: Will show no signs and symptoms of excessive bleeding  Description: Will show no signs and symptoms of excessive bleeding  7/6/2020 0934 by Ezra Govea RN  Outcome: Met This Shift

## 2020-07-06 NOTE — PLAN OF CARE
Problem: Falls - Risk of:  Goal: Will remain free from falls  Description: Will remain free from falls  Outcome: Met This Shift     Problem: Skin Integrity:  Goal: Will show no infection signs and symptoms  Description: Will show no infection signs and symptoms  Outcome: Met This Shift     Problem: Bleeding:  Goal: Will show no signs and symptoms of excessive bleeding  Description: Will show no signs and symptoms of excessive bleeding  Outcome: Met This Shift

## 2020-07-07 VITALS
DIASTOLIC BLOOD PRESSURE: 73 MMHG | SYSTOLIC BLOOD PRESSURE: 108 MMHG | WEIGHT: 122 LBS | RESPIRATION RATE: 16 BRPM | TEMPERATURE: 98.6 F | OXYGEN SATURATION: 100 % | HEIGHT: 60 IN | HEART RATE: 101 BPM | BODY MASS INDEX: 23.95 KG/M2

## 2020-07-07 PROBLEM — N17.9 AKI (ACUTE KIDNEY INJURY) (HCC): Status: ACTIVE | Noted: 2020-07-07

## 2020-07-07 PROBLEM — E87.20 METABOLIC ACIDOSIS: Status: ACTIVE | Noted: 2020-07-07

## 2020-07-07 PROBLEM — D69.6 THROMBOCYTOPENIA (HCC): Status: ACTIVE | Noted: 2020-07-07

## 2020-07-07 PROBLEM — F10.10 ETOH ABUSE: Status: ACTIVE | Noted: 2020-07-07

## 2020-07-07 PROBLEM — K70.9 ALCOHOLIC LIVER DISEASE (HCC): Status: ACTIVE | Noted: 2020-07-07

## 2020-07-07 PROBLEM — K25.3: Status: ACTIVE | Noted: 2020-07-07

## 2020-07-07 PROBLEM — M62.82 NON-TRAUMATIC RHABDOMYOLYSIS: Status: ACTIVE | Noted: 2020-07-07

## 2020-07-07 LAB
ALBUMIN SERPL-MCNC: 2.1 G/DL (ref 3.5–5.2)
ALP BLD-CCNC: 86 U/L (ref 35–104)
ALT SERPL-CCNC: 120 U/L (ref 0–32)
ANION GAP SERPL CALCULATED.3IONS-SCNC: 10 MMOL/L (ref 7–16)
ANISOCYTOSIS: ABNORMAL
AST SERPL-CCNC: 121 U/L (ref 0–31)
BASOPHILS ABSOLUTE: 0.01 E9/L (ref 0–0.2)
BASOPHILS RELATIVE PERCENT: 0.3 % (ref 0–2)
BILIRUB SERPL-MCNC: 1.1 MG/DL (ref 0–1.2)
BUN BLDV-MCNC: 9 MG/DL (ref 6–20)
BURR CELLS: ABNORMAL
CALCIUM SERPL-MCNC: 7.3 MG/DL (ref 8.6–10.2)
CHLORIDE BLD-SCNC: 111 MMOL/L (ref 98–107)
CO2: 18 MMOL/L (ref 22–29)
CREAT SERPL-MCNC: 0.7 MG/DL (ref 0.5–1)
EOSINOPHILS ABSOLUTE: 0.07 E9/L (ref 0.05–0.5)
EOSINOPHILS RELATIVE PERCENT: 1.8 % (ref 0–6)
GFR AFRICAN AMERICAN: >60
GFR NON-AFRICAN AMERICAN: >60 ML/MIN/1.73
GLUCOSE BLD-MCNC: 97 MG/DL (ref 74–99)
HCT VFR BLD CALC: 23.8 % (ref 34–48)
HEMOGLOBIN: 7.7 G/DL (ref 11.5–15.5)
IMMATURE GRANULOCYTES #: 0.15 E9/L
IMMATURE GRANULOCYTES %: 3.8 % (ref 0–5)
LYMPHOCYTES ABSOLUTE: 1.04 E9/L (ref 1.5–4)
LYMPHOCYTES RELATIVE PERCENT: 26.1 % (ref 20–42)
MAGNESIUM: 1.4 MG/DL (ref 1.6–2.6)
MCH RBC QN AUTO: 37.2 PG (ref 26–35)
MCHC RBC AUTO-ENTMCNC: 32.4 % (ref 32–34.5)
MCV RBC AUTO: 115 FL (ref 80–99.9)
MONOCYTES ABSOLUTE: 0.53 E9/L (ref 0.1–0.95)
MONOCYTES RELATIVE PERCENT: 13.3 % (ref 2–12)
NEUTROPHILS ABSOLUTE: 2.19 E9/L (ref 1.8–7.3)
NEUTROPHILS RELATIVE PERCENT: 54.7 % (ref 43–80)
PDW BLD-RTO: 19.9 FL (ref 11.5–15)
PHOSPHORUS: 4 MG/DL (ref 2.5–4.5)
PLATELET # BLD: 75 E9/L (ref 130–450)
PLATELET CONFIRMATION: NORMAL
PMV BLD AUTO: 10.1 FL (ref 7–12)
POIKILOCYTES: ABNORMAL
POTASSIUM SERPL-SCNC: 3.4 MMOL/L (ref 3.5–5)
RBC # BLD: 2.07 E12/L (ref 3.5–5.5)
SODIUM BLD-SCNC: 139 MMOL/L (ref 132–146)
TOTAL CK: 869 U/L (ref 20–180)
TOTAL PROTEIN: 5.1 G/DL (ref 6.4–8.3)
WBC # BLD: 4 E9/L (ref 4.5–11.5)

## 2020-07-07 PROCEDURE — 6370000000 HC RX 637 (ALT 250 FOR IP): Performed by: INTERNAL MEDICINE

## 2020-07-07 PROCEDURE — 97530 THERAPEUTIC ACTIVITIES: CPT

## 2020-07-07 PROCEDURE — 85025 COMPLETE CBC W/AUTO DIFF WBC: CPT

## 2020-07-07 PROCEDURE — 80053 COMPREHEN METABOLIC PANEL: CPT

## 2020-07-07 PROCEDURE — 99238 HOSP IP/OBS DSCHRG MGMT 30/<: CPT | Performed by: FAMILY MEDICINE

## 2020-07-07 PROCEDURE — 36415 COLL VENOUS BLD VENIPUNCTURE: CPT

## 2020-07-07 PROCEDURE — 83735 ASSAY OF MAGNESIUM: CPT

## 2020-07-07 PROCEDURE — 2580000003 HC RX 258: Performed by: INTERNAL MEDICINE

## 2020-07-07 PROCEDURE — 6370000000 HC RX 637 (ALT 250 FOR IP): Performed by: FAMILY MEDICINE

## 2020-07-07 PROCEDURE — 2500000003 HC RX 250 WO HCPCS: Performed by: INTERNAL MEDICINE

## 2020-07-07 PROCEDURE — 82550 ASSAY OF CK (CPK): CPT

## 2020-07-07 PROCEDURE — 84100 ASSAY OF PHOSPHORUS: CPT

## 2020-07-07 RX ORDER — POTASSIUM CHLORIDE 20 MEQ/1
20 TABLET, EXTENDED RELEASE ORAL 2 TIMES DAILY WITH MEALS
Status: DISCONTINUED | OUTPATIENT
Start: 2020-07-07 | End: 2020-07-07 | Stop reason: HOSPADM

## 2020-07-07 RX ADMIN — MAGNESIUM OXIDE TAB 400 MG (241.3 MG ELEMENTAL MG) 400 MG: 400 (241.3 MG) TAB at 12:57

## 2020-07-07 RX ADMIN — Medication 10 ML: at 09:22

## 2020-07-07 RX ADMIN — SODIUM BICARBONATE: 84 INJECTION, SOLUTION INTRAVENOUS at 09:19

## 2020-07-07 RX ADMIN — POTASSIUM CHLORIDE 20 MEQ: 20 TABLET, EXTENDED RELEASE ORAL at 12:57

## 2020-07-07 RX ADMIN — PANTOPRAZOLE SODIUM 40 MG: 40 TABLET, DELAYED RELEASE ORAL at 06:59

## 2020-07-07 ASSESSMENT — PAIN SCALES - GENERAL: PAINLEVEL_OUTOF10: 0

## 2020-07-07 NOTE — CARE COORDINATION
Social Work:    Per Dr. Jessica Ordoñez, referral was called to [facility], however they only contract with patient's insurance for O.P. services, not in-house rehab. A call was placed to [facility] Luisa [facility] at [facility] and they have a in-patient bed opened. Social service faxed [facility] information today and they are evaluating for possible admission. Await call back.     Electronically signed by SHOLA Howell on 7/7/2020 at 12:45 PM

## 2020-07-07 NOTE — PROGRESS NOTES
Admit Date: 6/30/2020       Subjective:  Chief Complaint   Patient presents with    Extremity Weakness     Pt has progressively worsening weakness since this weekend. Hx of anemia. Denies recent obvious bleeding.        Renal states cont iv fluids til ck 500  Anxious to leave  Waiting to get outpt etoh rehab set up    hgb 8.7 , platelets increased to 75  Has mag infusion going per renal level 1.6  Was 1.4 yesterday    Iv fluids   Monitor labs       Soc serv to help with rehab options for etoh       Objective:    Scheduled Meds:  Current Facility-Administered Medications   Medication Dose Route Frequency Provider Last Rate Last Dose    sodium bicarbonate 75 mEq in sodium chloride 0.45 % 1,000 mL infusion   Intravenous Continuous Genaro Subramanian  mL/hr at 07/06/20 1755      calcium carbonate (TUMS) chewable tablet 500 mg  500 mg Oral TID PRN Siva Moralez MD   500 mg at 07/05/20 2058    pantoprazole (PROTONIX) tablet 40 mg  40 mg Oral QAM AC Miladis Clemente MD   40 mg at 07/07/20 0659    sodium chloride flush 0.9 % injection 10 mL  10 mL Intravenous 2 times per day Tommy Duran MD   10 mL at 07/05/20 2058    sodium chloride flush 0.9 % injection 10 mL  10 mL Intravenous PRN Tommy Duran MD        acetaminophen (TYLENOL) tablet 650 mg  650 mg Oral Q6H PRN Tommy Duran MD        Or    acetaminophen (TYLENOL) suppository 650 mg  650 mg Rectal Q6H PRN Tommy Duran MD        polyethylene glycol (GLYCOLAX) packet 17 g  17 g Oral Daily PRN Tommy Duran MD        prochlorperazine (COMPAZINE) injection 10 mg  10 mg Intravenous Q6H PRN Tommy Duran MD   10 mg at 07/02/20 2019       Continuous Infusions  :   sodium bicarbonate infusion 100 mL/hr at 07/06/20 1755       PRN Meds:  calcium carbonate, sodium chloride flush, acetaminophen **OR** acetaminophen, polyethylene glycol, prochlorperazine      Wt Readings from Last 3 Encounters:   07/03/20 109 lb 5.6 oz (49.6 kg)   10/20/19 100 lb (45.4 kg)   12/23/17 100 lb (45.4 kg)     No intake/output data recorded. Intake/Output Summary (Last 24 hours) at 7/7/2020 0741  Last data filed at 7/7/2020 6592  Gross per 24 hour   Intake --   Output 650 ml   Net -650 ml       Vitals:    07/06/20 2135   BP: 113/61   Pulse: 101   Resp: 16   Temp: 98 °F (36.7 °C)   SpO2: 100%       Physical Exam:                   General appearance - oriented to person, place, and time and ill-appearing  Mental status - inappropriate safety awareness  Chest - clear to auscultation, no wheezes, rales or rhonchi, symmetric air entry  Heart - normal rate, regular rhythm, normal S1, S2, no murmurs, rubs, clicks or gallops  Abdomen - soft, nontender, nondistended, no masses or organomegaly  }  Extremities - peripheral pulses normal, no pedal edema, no clubbing or cyanosis  Skin - normal coloration and turgor, no rashes, no suspicious skin lesions noted. Sl jaundice                           CBC  Recent Labs     07/05/20  0325 07/06/20  0925   WBC 4.4* 5.1   HGB 8.9* 8.7*   HCT 27.9* 27.8*   .3* 117.8*   PLT 63* 75*     BMP  Recent Labs     07/05/20  0325 07/06/20  0925    138   K 3.9 3.6   * 110*   CO2 19* 16*   BUN 23* 12   CREATININE 0.8 0.7   LABGLOM >60 >60   CALCIUM 7.7* 7.7*     LFT's  Recent Labs     07/05/20  0325 07/06/20  0925   * 173*     INR:   No results for input(s): INR in the last 72 hours. No results found for: CRP  No results found for: SEDRATE  No results for input(s): POCGLU in the last 72 hours. Lipids: No results for input(s): CHOL, HDL in the last 72 hours.     Invalid input(s): LDLCALCU  ABGs: No results found for: PHART, PO2ART, JZF3DZO  SpO2 Readings from Last 1 Encounters:   07/06/20 100%       Last 3 Troponin:    Lab Results   Component Value Date    TROPONINI 0.08 07/02/2020    TROPONINI 0.09 07/02/2020    TROPONINI 0.07 07/01/2020     Lab Results   Component Value Date    CKTOTAL 1,737 (H) 07/05/2020    CKTOTAL 1,608 (H) 07/04/2020    CKTOTAL 2,784 (H) 07/03/2020    TROPONINI 0.08 (H) 07/02/2020    TROPONINI 0.09 (H) 07/02/2020    TROPONINI 0.07 (H) 07/01/2020        TSH:    Lab Results   Component Value Date    TSH 5.760 10/17/2019      Vent Information  SpO2: 100 %      U/A:     Lab Results   Component Value Date    COLORU Yellow 07/01/2020    PHUR 6.0 07/01/2020    WBCUA 10-20 07/01/2020    RBCUA 2-5 07/01/2020    BACTERIA MANY 07/01/2020    CLARITYU SL CLOUDY 07/01/2020    SPECGRAV 1.015 07/01/2020    LEUKOCYTESUR LARGE 07/01/2020    UROBILINOGEN 0.2 07/01/2020    BILIRUBINUR SMALL 07/01/2020    BLOODU LARGE 07/01/2020    GLUCOSEU Negative 07/01/2020          Iron studies:  Lab Results   Component Value Date    FERRITIN 491 07/05/2020     Bone disease:  Lab Results   Component Value Date    MG 1.6 07/06/2020    PHOS 3.6 07/06/2020     Nutrition:No results found for: ALB           Assessment:  Patient Active Problem List   Diagnosis Code    Anemia D64.9    Iron deficiency anemia due to chronic blood loss D50.0    Anemia due to GI blood loss D50.0    Hepatic encephalopathy (HCC) K72.90    Moderate protein-calorie malnutrition (HCC) E44.0          1. Hepatic encephalopathy              Ammonia 130 , 189 now 25               lactulose     2. . GI bleed              Melanotic stools              hgb 8      3. Acute blood loss anemia     4. Macrocytosis     5. Alcoholic liver disease     6.  neri              Severe dehydration                7.  Metabolic acidosis              Anion gap 32              Low bicarb     8.  uti     9.  Elevated ck   Renal consult    10.  gallstones    11.  prepyloric antrum, there were  three small 5 to 6 mm gastric ulcers with benign features.   Vessel clipped x2       Plan:    Cont present care  Rehab options    Fabrizio Collazo M.D.    7/7/2020

## 2020-07-07 NOTE — CARE COORDINATION
Social Work:    Received word from Evan at [facility] stating that they will not have a bed open until Thursday and only have a residential bed open today, however, they do not feel Ashely Coker is appropriate for residential room due to her age. Social work and RN Katheryn spoke with Ashely Coker who was adamant about her desire to return home before making any commitments to go to an in-patient facility. Social work placed a call to [employer] at [facility] and [employer] stated she met with Ashely Coker today and provided [facility] information to her. [facility] and revisited with Nardaelías Toscanoky once more and Ashely Coker agreed to do the [facility] assessment via phone prior to discharge home today. Social work [facility] Dr[facility]. Plan is for discharge home.     Electronically signed by SHOLA Sanchez on 7/7/2020 at 4:10 PM

## 2020-07-07 NOTE — PROGRESS NOTES
[facility] MD Tony Szymanski MD Eleni Seller, MD. Kacy Sewell MD   Progress Note    7/7/2020    SUBJECTIVE:   On RA   euvolemic   Alert oriented x3  Lying flat in NAD     PROBLEM LIST:    Active Problems:    Hepatic encephalopathy ([address]    Moderate protein-calorie malnutrition ([address]  Resolved Problems:    * No resolved hospital problems. *       DIET:    DIET GENERAL;  Dietary Nutrition Supplements: Standard High Calorie Oral Supplement  Dietary Nutrition Supplements: Frozen Oral Supplement       Allergies : Pcn [penicillins]    Past Medical History:   Diagnosis Date    Anemia     ETOH abuse        Past Surgical History:   Procedure Laterality Date    UPPER GASTROINTESTINAL ENDOSCOPY N/A 10/21/2019    EGD BIOPSY performed by Nickolas Hubbard MD at [facility] N/A 7/3/2020    EGD BIOPSY performed by Nickolas Hubbard MD at [address] [place] [address]  7/3/2020    EGD CONTROL HEMORRHAGE performed by Nickolas Hubbard MD at [facility]       No family history on file. reports that she has quit smoking. She has never used smokeless tobacco. She reports current alcohol use of about 5.0 standard drinks of alcohol per week. She reports that she does not use drugs. Review of Systems:   Constitutional: weak   Eyes: no eye pain , no itching , no drainage  Ears, nose, mouth, throat, and face: no ear ,nose pain , hearing is ok ,no nasal drainage   Respiratory: no sob ,no cough ,no wheezing . Cardiovascular: no chest pain , no palpitation ,no sob . Gastrointestinal: no nausea, vomiting , constipation , no abdominal pain . Genitourinary:no urinary retention , no burning , dysuria . No polyuria   Hematologic/lymphatic: no bleeding , no cougulation issues . Musculoskeletal:no joint pain , no swelling . Neurological: no headaches ,no weakness , no numbness . Endocrine: no thirst , no weight issues .      MEDS (scheduled):    potassium chloride  20 mEq Oral BID WC    magnesium oxide  400 mg Oral Daily    pantoprazole  40 mg Oral QAM AC    sodium chloride flush  10 mL Intravenous 2 times per day       MEDS (infusions):   sodium bicarbonate infusion 100 mL/hr at 07/07/20 0919       MEDS (prn):  calcium carbonate, sodium chloride flush, acetaminophen **OR** acetaminophen, polyethylene glycol, prochlorperazine    PHYSICAL EXAM:     Patient Vitals for the past 24 hrs:   BP Temp Temp src Pulse Resp SpO2   07/07/20 0730 108/73 98.6 °F (37 °C) Oral 101 16 100 %   07/06/20 2135 113/61 98 °F (36.7 °C) Oral 101 16 100 %   @      Intake/Output Summary (Last 24 hours) at 7/7/2020 1057  Last data filed at 7/7/2020 0711  Gross per 24 hour   Intake --   Output 650 ml   Net -650 ml         Wt Readings from Last 3 Encounters:   07/03/20 109 lb 5.6 oz (49.6 kg)   10/20/19 100 lb (45.4 kg)   12/23/17 100 lb (45.4 kg)       Constitutional:  in no acute distress  Oral: mucus membranes moist  Neck: no JVD  Cardiovascular: S1, S2 regular rhythm, no murmur,or rub  Respiratory:  No crackles, no wheeze  Gastrointestinal:  Soft, nontender, nondistended, NABS  Ext: no edema, feet warm  Skin: dry, no rash  Neuro: awake, alert, interactive      DATA:    Recent Labs     07/05/20 0325 07/06/20  0925 07/07/20  0630   WBC 4.4* 5.1 4.0*   HGB 8.9* 8.7* 7.7*   HCT 27.9* 27.8* 23.8*   .3* 117.8* 115.0*   PLT 63* 75* 75*     Recent Labs     07/05/20 0325 07/06/20  0925 07/07/20  0630    138 139   K 3.9 3.6 3.4*   * 110* 111*   CO2 19* 16* 18*   MG 1.4* 1.6 1.4*   PHOS 3.4 3.6 4.0   BUN 23* 12 9   CREATININE 0.8 0.7 0.7   * 173* 120*   * 192* 121*   BILITOT 1.2 1.3* 1.1   ALKPHOS 96 107* 86       No results found for: LABPROT    ASSESSMENT / RECOMMENDATIONS:      1.   Acute kidney injury due to volume depletion along with rhabdomyolysis unlikely to be due to hepatorenal syndrome as it improved significantly with crystalloid  fluid. 2.  Hepatic encephalopathy. 3.  Gastrointestinal bleed, there was concern for peptic ulcer disease. 4.  Alcohol abuse  5. Hypokalemia.     PLAN:    neri resolved   CK trending down   No tender spots . Her rhabdomyolysis is likely in contest of alcohol drinking   Continue to look clinially euvolemic       Recommendation :     Continue  Current iv fluids while in house   Place on mg oxide 400 mg daily (keep on discharge )  Abstain from drinking   Ok to d/c from renal POV . Will need bmp , mg , phosph in 1-3 days .      Electronically signed by Dee Hill MD on 7/7/2020 at 10:57 AM

## 2020-07-07 NOTE — PLAN OF CARE
Problem: Malnutrition  (NI-5.2)  Goal: Food and/or Nutrient Delivery  Continue Diet. Will Continue Ensure High Kenneth ONS BID and d/c Magic Cup's per pt request.    Description: Individualized approach for food/nutrient provision.   Outcome: Met This Shift

## 2020-07-07 NOTE — PATIENT CARE CONFERENCE
[facility] Quality Flow/Interdisciplinary Rounds Progress Note        Quality Flow Rounds held on July 7, 2020    Disciplines Attending:  Bedside Nurse, ,  and Nursing Unit Leadership    Mali Alvarez was admitted on 6/30/2020 10:34 PM    Anticipated Discharge Date:  Expected Discharge Date: 07/04/20    Disposition:    Jaleel Score:  Jaleel Scale Score: 18    Readmission Risk              Risk of Unplanned Readmission:        19           Discussed patient goal for the day, patient clinical progression, and barriers to discharge.   The following Goal(s) of the Day/Commitment(s) have been identified:  Await OK from rehab referal.      Brittaney Mcconnell  July 7, 2020

## 2020-07-07 NOTE — PROGRESS NOTES
Physical Therapy    Pt refused rx this AM, states having mild chest pain, wants to stay in bed. Nurse informed. Will follow on another date/time.     Miriam Estes PTA [id number]

## 2020-07-07 NOTE — PROGRESS NOTES
Nutrition Assessment    Type and Reason for Visit: Reassess    Nutrition Recommendations: Continue Diet. Will Continue Ensure High Kenneth ONS BID and d/c Magic Cup's per pt request.      Nutrition Assessment: Pt improving from a nutritional stand-point AEB pt consuming ~50-75% of most meals w/ no noted complaints at this time per pt/EMR. Pt remains at risk d/t Moderate Malnutrition 2/2 h/o ETOH Abuse w/ liver disease. Malnutrition Assessment:  · Malnutrition Status: Meets the criteria for moderate malnutrition  · Context: Chronic illness  · Findings of the 6 clinical characteristics of malnutrition (Minimum of 2 out of 6 clinical characteristics is required to make the diagnosis of moderate or severe Protein Calorie Malnutrition based on AND/ASPEN Guidelines):  1. Energy Intake-Less than or equal to 50% of estimated energy requirement, (currently ~4d)    2. Weight Loss-No significant weight loss    3. Fat Loss-Moderate subcutaneous fat loss, Triceps  4. Muscle Loss-Moderate muscle mass loss, Clavicles (pectoralis and deltoids), Calf (gastrocnemius)  5. Fluid Accumulation-No significant fluid accumulation(multifactorial (hepatorenal syndrome))    6.  Strength-Not measured    Nutrition Risk Level: Moderate    Nutrient Needs:  · Estimated Daily Total Kcal: 1574-2573(MSJ x 1.2 SF per ABW)  · Estimated Daily Protein (g): 55-65(1.2-1.4 gm/kg per ABW)  · Estimated Daily Total Fluid (ml/day): 0589-4855(1 ml/kcal)    Nutrition Diagnosis:   · Problem: Moderate malnutrition, In context of chronic illness  · Etiology: related to Cognitive or neurological impairment(2/2 Hep. Enceph.  w/ ETOH Abuse hx)     Signs and symptoms:  as evidenced by Intake 50-75%, Diet history of poor intake, Moderate loss of subcutaneous fat, Moderate muscle loss    Objective Information:  · Nutrition-Focused Physical Findings: A&O, poor dentition, Abd/BS WDL, No Edema, +Jaundice, I/O's WNL  · Wound Type: None  · Current Nutrition

## 2020-07-07 NOTE — PROGRESS NOTES
Occupational Therapy  OT BEDSIDE TREATMENT NOTE      Date:2020  Patient Name: Destin Samson  MRN: [id number]  : 1965  Room: [address] [place], [place] [address]     Referring Provider: Marline Hammond MD     Evaluating OT: Edgar Galeas OTR/L [id number]     AM-PAC Daily Activity Raw Score:      Recommended Adaptive Equipment: TBD     Diagnosis: hepatic encephalopathy. Pt presents to ED with worsening weakness. Pertinent Medical History: ETOH abuse, anemia  Precautions:  falls     Home Living: Pt lives with  in a hotel with level entry.    Bathroom setup: walk in shower, standard commode      Prior Level of Function: Pt reports mostly independent with ADLs but  can assist if needed,  assists PRN with IADLs; completed functional mobility with no AD     Pain Level: No c/o pain     Cognition: A&O: 4/4 with fair ability to follow commands                Functional Assessment:    Initial Eval Status  Date: 20 Treatment session: 20 Short Term Goals  Treatment frequency: 2-5x/wk PRN x1-3 wks      Feeding Independent       Grooming SBA  Standing sink level for hand hygiene   Independent   UB Dressing Set up   Independetn   LB Dressing Min A  Management of sandals SBA to don pants and slip on shoes  Mod I    Bathing Mod A   Mod I   Toileting SBA  Use of grab bar for support in transfer, able to manage claudia care and brief   Mod I   Bed Mobility  Sit to Supine: Independent Supine to sit: Min A      Functional Transfers STS: SBA STS: SBA from EOB  Independent   Functional Mobility SBA with Mann phipps for safety with turns  Limited further due to increased weakness and fatigue, pt requesting to return to supine SBA- Sup using ww household plus distance.  Mod I during ADLs   Balance Sitting: fair plus     Standing: fair at [employer]  Sitting: Independent  Standing: SBA     Activity Tolerance Fair minus  Fair- standing viktor x6-7 min with fair plus balance during self care tasks         B UE were [facility] during tasks. Comments: Upon arrival pt was supine in bed with  present. At end of session pt was transferred to EOB with all lines and tubes intact and call light within reach. Treatment: Pt required vc for motivation from therapist and  to participate in tx session. Provided pt with vc for pacing to prevent falls during functional mobility. Education: Fall prevention training and benefits of OOB activity. · Pt has made good progress towards set goals.    · Continue with current plan of care      Treatment Charges: Mins Units   Ther Ex  [id number]     Manual Therapy [id number]     Thera Activities 20487 10 1   ADL/Home Mgt [id number] 5 0   Neuro Re-ed [id number]     Group Therapy      Orthotic manage/training  [id number]     Non-Billable Time     Total Timed Treatment 15 1       Fidel CRAFT/VINNIE [id number]

## 2020-07-09 NOTE — ADT AUTH CERT
Liver Disease Complications - Care Day 6 (7/6/2020) by Nikolas Garcia RN         Review Status Review Entered   Completed 7/9/2020 10:05       Criteria Review      Care Day: 6 Care Date: 7/6/2020 Level of Care: Inpatient Floor    Guideline Day 3    Clinical Status    ( ) * Hemodynamic stability    7/9/2020 10:05 AM EDT by Nicolas Halo          (X) * Tachypnea absent    7/9/2020 10:05 AM EDT by Nicolas Halo      resp rate 16    (X) * Afebrile    7/9/2020 10:05 AM EDT by Nicolas Halo      tmax 98    ( ) * No bleeding    (X) * No peritonitis, or treatment adequate    (X) * Ascites absent or adequately controlled    (X) * Volume status adequately controlled    (X) * Renal function at baseline or acceptable for next level of care    7/9/2020 10:05 AM EDT by Nicolas Halo      creatinine 0.7    ( ) * Electrolyte levels adequate for outpatient management    (X) * Mental status at baseline    7/9/2020 10:05 AM EDT by Nicolas Halo      baseline    ( ) * Diet tolerated    ( ) * Discharge plans and education understood    Activity    ( ) * Ambulatory [I]    Routes    ( ) * Oral hydration, medications, and diet    7/9/2020 10:05 AM EDT by Nicolas Halo      magnesium 1gm IV x1, 1/2NS +75meq sodium bicarb IV @100ml/hr    * Milestone   Additional Notes   07/06/2020 care day 6   VS T 98 P 114 R 16 /63 spo2 100% RA      Labs   Chloride: 110 (H)   CO2: 16 (L)   Glucose: 110 (H)   Calcium: 7.7 (L)   Total Protein: 6.3 (L)   Albumin: 2.8 (L)   Alk Phos: 107 (H)   ALT: 173 (H)   AST: 192 (H)   Bilirubin: 1.3 (H)   RBC: 2.36 (L)   Hemoglobin Quant: 8.7 (L)   Hematocrit: 27.8 (L)   MCV: 117.8 (H)   MCH: 36.9 (H)   MCHC: 31.3 (L)   RDW: 20.8 (H)   Platelet Count: 75 (L)         Meds: magnesium 1gm IV x1, protonix 40mg po daily, 1/2NS +75meq sodium bicarb IV @100ml/hr,      nephrology note   ASSESSMENT / RECOMMENDATIONS:         1.  Acute kidney injury due to volume depletion along with rhabdomyolysis unlikely to be due to hepatorenal syndrome as it improved significantly with crystalloid   fluid. 2.  Hepatic encephalopathy. 3.  Gastrointestinal bleed, there was concern for peptic ulcer disease. 4.  Alcohol abuse   5.  Hypokalemia.       PLAN:       neri resolved    CK trending down    No tender spots . Her rhabdomyolysis is likely in contest of alcohol drinking    Continue to look clinially euvolemic            Recommendation :        Continue iv fluids in form of NS at 100 cc/hr till ck <500   Replace Mg    Abstain from drinking    Am labs      family medicine note       1.  Hepatic encephalopathy               Ammonia 130 , 189 now 25                lactulose       2. . GI bleed               Melanotic stools               hgb 8                  3.  Acute blood loss anemia       4.  Macrocytosis       5.  Alcoholic liver disease       6.  neri               Severe dehydration       7.  Metabolic acidosis               Anion gap 32               Low bicarb       8.  uti       9.  Elevated ck               Renal consult       10.  gallstones       11.  prepyloric antrum, there were   three small 5 to 6 mm gastric ulcers with benign features.    Vessel clipped x2           Plan:       Cont present care   Rehab options          Liver Disease Complications - Care Day 5 (7/5/2020) by Birgit Elizalde RN         Review Status Review Entered   Completed 7/9/2020 10:04       Criteria Review      Care Day: 5 Care Date: 7/5/2020 Level of Care: Inpatient Floor    Guideline Day 3    Clinical Status    ( ) * Hemodynamic stability    7/9/2020 10:04 AM EDT by Shefali Stevens          (X) * Tachypnea absent    7/9/2020 10:04 AM EDT by Shefali Stevens      resp rate 16    (X) * Afebrile    7/9/2020 10:04 AM EDT by Shefali Stevens      tmax 98.6    ( ) * No bleeding    (X) * No peritonitis, or treatment adequate    (X) * Ascites absent or adequately controlled    (X) * Volume status RECOMMENDATIONS:         1.  Acute kidney injury due to volume depletion along with rhabdomyolysis unlikely to be due to hepatorenal syndrome as it improved significantly with crystalloid   fluid. 2.  Hepatic encephalopathy. 3.  Gastrointestinal bleed, there was concern for peptic ulcer disease. 4.  Alcohol abuse   5.  Hypokalemia.       PLAN:       neri resolved    CK trending down    No tender spots    Continue to look clinially euvolemic            Recommendation :    Continue iv fluids in form of NS at 100 cc /hr    Am labs (bmp , ck , mg , phosphour )    Will f/u PRN            hem/onc note   A/P:   48year old female with ETOH abuse states has been drinking 1 bottle of vodka every 4 days. She was admitted with GI bleed and and EGD showed ulcers. She had a platelet count of 443 and hb of 12 on admission 6-30-20 and Hb dropped down to 6.7 the next day with platelets=110. She was started on IV protonix 7-1-20 and continued on IV protonix last dose was 7-4-20. Platelets dropped to 86,000 on 7-2, 71,000 on 7-3 and 65,000 on 7-4. She has been started on oral protonix today and IV d/c'd last dose given today. Her hb is stable now at 9.1. B12 and folate normal and no iron studies. She received 1 rbc transfusion on 7-1-20.       1. Progressive thrombocytopenia likely due to IV protonix given 7-1 through 7-4, she is not starting oral protonix tomorrow. Expect her thrombocytopenia to gradually improve now that the IV protonix has been stopped, however, she did get a dose today so platelet counts may be lower tomorrow. 2. Check iron studies   3. Cbc daily   4. Recommended ETOH cessation      Family medicine note   1.  Hepatic encephalopathy               Ammonia 130 , 189 now 25               On lactulose       2. . GI bleed               Melanotic stools               hgb 8               Transfused x1 for scope tomorrow   3.  Acute blood loss anemia       4.  Macrocytosis       5.  Alcoholic liver disease       6.  neri               Severe dehydration       7.  Metabolic acidosis               Anion gap 32               Low bicarb       8.  uti       9.  Elevated ck               Renal consult       10.  gallstones                   Plan:       Cont present care              Liver Disease Complications - Care Day 3 (7/3/2020) by Birgit Elizalde RN         Review Status Review Entered   Completed 7/9/2020 09:59       Criteria Review      Care Day: 3 Care Date: 7/3/2020 Level of Care: ICU    Guideline Day 2    Clinical Status    (X) * Hypoxemia absent    7/9/2020 9:59 AM EDT by Shefali Stevens      spo2 100%    (X) * Hemodynamic stability    7/9/2020 9:59 AM EDT by Shefali Stevens      HR 88 /66    ( ) * Ascites absent, stable, or improved    (X) * Mental status at baseline or improved    7/9/2020 9:59 AM EDT by Shefali Stevens      improving    Routes    (X) * Clear liquid diet    (X) IV fluids and medication    7/9/2020 9:59 AM EDT by Shefali Stevens      magnesium 2gm IV x1, protonix 40mg IV twice daily, sodium phosphate 20mmol IV x1, NS IV @75ml/hr    * Milestone   Additional Notes   07/03/2020 care day 3   /66   Pulse 88   Temp 98.1 °F (36.7 °C) (Bladder)   Resp 16   Ht 5' (1.524 m)   Wt 109 lb 5.6 oz (49.6 kg)   SpO2 100%      Labs   BUN: 51 (H)   Creatinine: 1.1 (H)   Glucose: 126 (H)   Calcium: 7.9 (L)   Phosphorus: 1.6 (L)   Total Protein: 5.3 (L)   Total CK: 2,784 (H)   Albumin: 2.3 (L)   ALT: 233 (H)   AST: 510 (H)   Bilirubin: 1.4 (H)   RBC: 2.30 (L)   Hemoglobin Quant: 8.5 (L)   Hematocrit: 25.0 (L)   MCV: 108.7 (H)   MCH: 37.0 (H)         Meds: magnesium 1gm IV x1, protonix 40mg IV twice daily, sodium phosphate 20mmol IV x1, NS IV @ 75ml/hr      OP NOTE   DATE OF PROCEDURE:  07/03/2020       PROCEDURE PERFORMED:  Esophagogastroduodenoscopy plus clipping of a   possible visible vessel.       HISTORY:  She is 48.  She is an alcoholic, seems to have been abstinent.    Previously, she had been evaluated for anemia without kyle bleeding. She had ascites, but there was no confirmation of anything other than   alcoholic hepatitis and cirrhosis and portal hypertension were not   confirmed.  She had no varices in October of last year. Austin Hoffmann presented   with acute renal failure, confusion, no ascites and markedly elevated   liver function studies, though a substantial portion of that was likely   related to rhabdomyolysis with a CPK of 9000 and an AST of 1700.  She   had melena. Austin Hoffmann has had a stable hemoglobin.       Preop is monitored anesthesia care.       DESCRIPTION OF PROCEDURE:  With her in the left lateral decubitus   position and sedated, a bite block was placed.  The Olympus GIF-H180   video endoscope was guided gently into the oropharynx and down the   length of a normal-appearing esophagus without varices to the stomach,   which was devoid of any content including blood or even clear fluid.     The body, fundus, and cardia were minimally erythematous.  There was no   mucosal disease proximal.  Distally in the prepyloric antrum, there were   three small 5 to 6 mm gastric ulcers with benign features.  Each had at   least one small red spot at the base.  The one in the middle along the   lesser curvature had a red spot, which seemed about 1 mm in size and   perhaps slightly elevated.  The pylorus, bulb, and postbulbar duodenum   were normal.  No duodenal ulcer this time as she had one last time.  The   endoscope was withdrawn to the antrum.  It was decided to clip what may   be a small vessel.  Two clips were applied.  Procedure was terminated   and well tolerated.       IMPRESSION:  Three ulcers.  No portal hypertension.  Transferred out of   the MICU to general medical floor.  Diet could be advanced tomorrow.          nephrology note   ASSESSMENT / RECOMMENDATIONS:         1.  Acute kidney injury due to volume depletion along with rhabdomyolysis unlikely to be due to hepatorenal

## 2020-07-11 ENCOUNTER — HOSPITAL ENCOUNTER (OUTPATIENT)
Age: 55
Discharge: HOME | End: 2020-07-11
Payer: COMMERCIAL

## 2020-07-11 LAB
ALBUMIN SERPL-MCNC: 2.3 G/DL (ref 3.5–5.2)
ALP BLD-CCNC: 91 U/L (ref 35–104)
ALT SERPL-CCNC: 83 U/L (ref 0–32)
ANION GAP SERPL CALCULATED.3IONS-SCNC: 9 MMOL/L (ref 7–16)
ANISOCYTOSIS: ABNORMAL
AST SERPL-CCNC: 97 U/L (ref 0–31)
BASOPHILS ABSOLUTE: 0.04 E9/L (ref 0–0.2)
BASOPHILS RELATIVE PERCENT: 0.8 % (ref 0–2)
BILIRUB SERPL-MCNC: 0.7 MG/DL (ref 0–1.2)
BUN BLDV-MCNC: 8 MG/DL (ref 6–20)
CALCIUM SERPL-MCNC: 7.6 MG/DL (ref 8.6–10.2)
CHLORIDE BLD-SCNC: 111 MMOL/L (ref 98–107)
CO2: 18 MMOL/L (ref 22–29)
CREAT SERPL-MCNC: 0.8 MG/DL (ref 0.5–1)
EOSINOPHILS ABSOLUTE: 0.06 E9/L (ref 0.05–0.5)
EOSINOPHILS RELATIVE PERCENT: 1.2 % (ref 0–6)
GFR AFRICAN AMERICAN: >60
GFR NON-AFRICAN AMERICAN: >60 ML/MIN/1.73
GLUCOSE BLD-MCNC: 110 MG/DL (ref 74–99)
HCT VFR BLD CALC: 24.6 % (ref 34–48)
HEMOGLOBIN: 8 G/DL (ref 11.5–15.5)
IMMATURE GRANULOCYTES #: 0.02 E9/L
IMMATURE GRANULOCYTES %: 0.4 % (ref 0–5)
LYMPHOCYTES ABSOLUTE: 1.64 E9/L (ref 1.5–4)
LYMPHOCYTES RELATIVE PERCENT: 33.3 % (ref 20–42)
MACRO-OVALOCYTES: ABNORMAL
MAGNESIUM: 1.4 MG/DL (ref 1.6–2.6)
MCH RBC QN AUTO: 37.6 PG (ref 26–35)
MCHC RBC AUTO-ENTMCNC: 32.5 % (ref 32–34.5)
MCV RBC AUTO: 115.5 FL (ref 80–99.9)
MONOCYTES ABSOLUTE: 0.34 E9/L (ref 0.1–0.95)
MONOCYTES RELATIVE PERCENT: 6.9 % (ref 2–12)
NEUTROPHILS ABSOLUTE: 2.82 E9/L (ref 1.8–7.3)
NEUTROPHILS RELATIVE PERCENT: 57.4 % (ref 43–80)
OVALOCYTES: ABNORMAL
PDW BLD-RTO: 18.8 FL (ref 11.5–15)
PHOSPHORUS: 3.3 MG/DL (ref 2.5–4.5)
PLATELET # BLD: 150 E9/L (ref 130–450)
PMV BLD AUTO: 9.2 FL (ref 7–12)
POIKILOCYTES: ABNORMAL
POTASSIUM SERPL-SCNC: 3.9 MMOL/L (ref 3.5–5)
RBC # BLD: 2.13 E12/L (ref 3.5–5.5)
SODIUM BLD-SCNC: 138 MMOL/L (ref 132–146)
TOTAL CK: 403 U/L (ref 20–180)
TOTAL PROTEIN: 5.8 G/DL (ref 6.4–8.3)
WBC # BLD: 4.9 E9/L (ref 4.5–11.5)

## 2020-07-11 PROCEDURE — 83735 ASSAY OF MAGNESIUM: CPT

## 2020-07-11 PROCEDURE — 84100 ASSAY OF PHOSPHORUS: CPT

## 2020-07-11 PROCEDURE — 80053 COMPREHEN METABOLIC PANEL: CPT

## 2020-07-11 PROCEDURE — 85025 COMPLETE CBC W/AUTO DIFF WBC: CPT

## 2020-07-11 PROCEDURE — 82550 ASSAY OF CK (CPK): CPT

## 2020-07-11 PROCEDURE — 36415 COLL VENOUS BLD VENIPUNCTURE: CPT

## 2020-07-11 RX ORDER — FUROSEMIDE 20 MG/1
20 TABLET ORAL DAILY
Qty: 30 TABLET | Refills: 5 | Status: SHIPPED | OUTPATIENT
Start: 2020-07-11 | End: 2021-01-07 | Stop reason: SDUPTHER

## 2020-07-11 RX ORDER — OMEPRAZOLE 40 MG/1
40 CAPSULE, DELAYED RELEASE ORAL
Qty: 30 CAPSULE | Refills: 5 | Status: SHIPPED | OUTPATIENT
Start: 2020-07-11 | End: 2021-01-07 | Stop reason: SDUPTHER

## 2020-07-11 NOTE — PROGRESS NOTES
stones  Musculoskeletal:        no arthritis, no  arthralgia, no myalgia, no  weakness,  no morning stiffness, no joint swelling   Neurologic:                 no paralysis, no paresis, no  paresthesia, no seizures, no  tremors, no headaches, no tumors , no stroke, no speech issues,  No incoordination, no head trauma, no memory loss/concentration  Hematologic:              no anemia, no abnormal bleeding/bruising, no fever, no chills, no night sweats, no wollen glands, no unexplained weight loss  Endocrine:        no heat or cold intolerance and no polyphagia, polydipsia,  or polyuria  Psych:   No depression no anxiety, no suicidal or homicidal thoughts, no irritability, no decreased energy, no trouble falling asleep, no early awakening , no trouble concentrating             Objective:    No family history on file. Past Medical History:   Diagnosis Date    Anemia     ETOH abuse        Social History     Socioeconomic History    Marital status:      Spouse name: Not on file    Number of children: Not on file    Years of education: Not on file    Highest education level: Not on file   Occupational History    Not on file   Social Needs    Financial resource strain: Not on file    Food insecurity     Worry: Not on file     Inability: Not on file    Transportation needs     Medical: Not on file     Non-medical: Not on file   Tobacco Use    Smoking status: Former Smoker    Smokeless tobacco: Never Used   Substance and Sexual Activity    Alcohol use:  Yes     Alcohol/week: 5.0 standard drinks     Types: 5 Standard drinks or equivalent per week     Comment: tequila daily    Drug use: No    Sexual activity: Not on file   Lifestyle    Physical activity     Days per week: Not on file     Minutes per session: Not on file    Stress: Not on file   Relationships    Social connections     Talks on phone: Not on file     Gets together: Not on file     Attends Holiness service: Not on file     Active member of club or organization: Not on file     Attends meetings of clubs or organizations: Not on file     Relationship status: Not on file    Intimate partner violence     Fear of current or ex partner: Not on file     Emotionally abused: Not on file     Physically abused: Not on file     Forced sexual activity: Not on file   Other Topics Concern    Not on file   Social History Narrative    Not on file           Scheduled Meds:  Current Outpatient Medications   Medication Sig Dispense Refill    magnesium oxide (MAG-OX) 400 (241.3 Mg) MG TABS tablet Take 1 tablet by mouth daily 30 tablet 1    sertraline (ZOLOFT) 100 MG tablet Take 1.5 tablets by mouth daily 45 tablet 5    omeprazole (PRILOSEC) 40 MG delayed release capsule Take 1 capsule by mouth every morning (before breakfast) 30 capsule 5    spironolactone (ALDACTONE) 25 MG tablet Take 1 tablet by mouth daily 30 tablet 5    furosemide (LASIX) 20 MG tablet Take 1 tablet by mouth daily 30 tablet 5    ferrous sulfate (CHESTER-AMANDA) 325 (65 Fe) MG tablet Take 1 tablet by mouth daily (with breakfast) 30 tablet 3     No current facility-administered medications for this visit. Wt Readings from Last 3 Encounters:   07/07/20 122 lb (55.3 kg)   10/20/19 100 lb (45.4 kg)   12/23/17 100 lb (45.4 kg)         There were no vitals filed for this visit. Lifestyle   Physical activity    Days per week: Not on file    Minutes per session: Not on file       Physical Exam   Constitutional: She is oriented to person, place, and time. No distress. HENT:   Head: Normocephalic. Laceration L occipital area   Neck: Normal range of motion. Neck supple. Thyromegaly present. Cardiovascular: Normal rate, regular rhythm and normal heart sounds. Pulmonary/Chest: Effort normal and breath sounds normal. No respiratory distress. Abdominal: Soft. Bowel sounds are normal. She exhibits distension. There is no abdominal tenderness. There is no rebound. Musculoskeletal:         General: Edema present. Neurological: She is alert and oriented to person, place, and time. Skin: She is not diaphoretic. Bruises from  hospitalization                                      Assessment:    There are no diagnoses linked to this encounter. 4. Reviewed labs    5. No follow-ups on file.            Dana Rojas M.D.    7/11/2020

## 2020-07-11 NOTE — DISCHARGE SUMMARY
Physician Discharge Summary     Patient ID:  Handy Trejo  [id number]  89 y.o.  1965    Admit date: 6/30/2020    Discharge date and time: 7/7/2020  5:31 PM     Admitting Physician: Siva Moralez MD     Discharge Physician: Siva Moralez    Consults: GI, nephrology and hematology/oncology    Admission Diagnoses:   Hepatic encephalopathy ([facility] [K72.90]  Hepatic encephalopathy ([facility] [K72.90]    Discharge Diagnoses:     Patient Active Problem List   Diagnosis    Anemia    Iron deficiency anemia due to chronic blood loss    Anemia due to GI blood loss    Hepatic encephalopathy (HCC)    Moderate protein-calorie malnutrition ([facility]    Prepyloric ulcer, acute    Non-traumatic rhabdomyolysis    ETOH abuse    Alcoholic liver disease ([facility]    Metabolic acidosis    [facility]    Thrombocytopenia [facility])       Hospital Course: pt admitted with severe weakness. Ammonia elevated at 136, etoh  <10, bun 1130/ cr 3.3, bili 3.3, alk phos 150, alt 380, ast 1727, platelets decreased to as low as 75. Hematology thought IV protonix was the culprit. Po not so much. She had melanotic stool  And scoped by Dr Erika Rodriguez for 3 prepyloric ulcers. Started on IV protonix. h pylori negative. Pt was going to go to rehab but decided outpt [facility] [place].  After discharge    Discharged Condition: fair    Significant Diagnostic Studies: labs: , microbiology: , radiology: CT scan:  and cardiac graphics: Telemetry:  and ECG:     Treatments: IV hydration, therapies: PT and OT, procedures: endoscopy     Disposition: home    Patient Instructions:     Discharge Medication List as of 7/7/2020  5:18 PM      START taking these medications    Details   magnesium oxide (MAG-OX) 400 (241.3 Mg) MG TABS tablet Take 1 tablet by mouth daily, Disp-30 tablet, R-1Normal         CONTINUE these medications which have NOT CHANGED    Details   sertraline (ZOLOFT) 100 MG tablet Take 1.5 tablets by mouth daily, Disp-45 tablet, R-5Normal      spironolactone (ALDACTONE) 25 MG tablet Take 1 tablet by mouth daily, Disp-30 tablet, R-5Normal      ferrous sulfate (CHESTER-AMANDA) 325 (65 Fe) MG tablet Take 1 tablet by mouth daily (with breakfast), Disp-30 tablet, R-3OTC      omeprazole (PRILOSEC) 40 MG delayed release capsule Take 1 capsule by mouth every morning (before breakfast), Disp-30 capsule, R-5Normal      furosemide (LASIX) 20 MG tablet Take 1 tablet by mouth daily, Disp-30 tablet, R-5Normal           Activity: activity as tolerated  Diet: regular diet  Wound Care: none needed    Follow-up with m3 in 2 weeks.     Richard Keane  7/11/2020  3:13 PM

## 2020-07-13 ENCOUNTER — HOSPITAL ENCOUNTER (OUTPATIENT)
Dept: PSYCHIATRY | Age: 55
Setting detail: THERAPIES SERIES
Discharge: HOME | End: 2020-07-13
Payer: COMMERCIAL

## 2020-07-13 PROCEDURE — H0001 ALCOHOL AND/OR DRUG ASSESS: HCPCS | Performed by: COUNSELOR

## 2020-07-13 ASSESSMENT — PATIENT HEALTH QUESTIONNAIRE - PHQ9: SUM OF ALL RESPONSES TO PHQ QUESTIONS 1-9: 9

## 2020-07-13 ASSESSMENT — LIFESTYLE VARIABLES: HISTORY_ALCOHOL_USE: YES

## 2020-07-14 NOTE — PLAN OF CARE
[facility]- Level of Care Placement      [x]Admissions  []Continued Stay []Discharge/Transfer / Complication in [address] [place] [address] CEWN:5/17/3168    Irvin Virgen      Level of Care Level 1      Outpatient Services Level 2.1   Intensive Outpatient Services(IOP) Level 2.5   Partial Hospitalization Services Level 3.1 CLINICALLY Managed Low-Intensity Residential Services Level 3.3  CLINICALLY Managed Population- Specific High- Intensity Residential Services Level 3.5  CLINICALLY Managed High Intensive Residential Services Level 3.7  MEDICALLY Monitored Intensive Inpatient Services Level 4  MEDICALLY Managed Intensive Inpatient Services   Dimension 1  Acute Intoxication and/or Withdrawal Potential [] Not experiencing significant withdrawal    [x] Minimal  risk of severe  withdrawal [] Minimal  risk of severe  withdrawal    [] Manageable  at Level 2-WM [] Moderate  risk of severe withdrawal    [] Manageable at Level 2-WM [] No withdrawal risk or minimal or stable withdrawal     [] Concurrently receiving Level -WM or Level 2-WM services [] Minimal risk of severe withdrawal    [] If withdrawal is present, manageable at Level 3. 2-WM  [] Minimal risk of severe withdrawal    [] If withdrawal is present manageable at Level 3. 2-WM [] High risk of withdrawal, but manageable at Level  3.7-WM and does not require  full resources of a licensed hospital [] At high risk of withdrawal and requires Level 4-WM and full resources of licensed hospital    COMMENTS:           Dimension 2   Biomedical Conditions and Complications  (BMC/C) [] None or very stable    [x] Receiving concurrent medical monitoring  [] None or not a distraction from treatment    [] Problems are manageable at Level 2.1 [] None or not sufficient to distract from treatment    [] Problems are manageable at  Level 2.5 [] None or stable    [] Receiving concurrent medical monitoring  [] None or stable    [] Receiving concurrent medical monitoring  [] None or stable    [] Receiving concurrent medical monitoring [] Requires 24-hour medical monitoring  but not intensive treatment [] Requires 24-hour medical & nursing care and the full  resources of a licensed hospital   COMMENTS:           Dimension 3  Emotional,  Behavioral,  or Cognitive Conditions and Complications   (EBC/C) [] None or very stable    [] Receiving concurrent mental health monitoring [x] Mild severity  with potential to distract from recovery; needs monitoring [] Mild to moderate severity, with potential to distract from recovery, needs stabilization [] None or minimal; not distracting to recovery    [] If  stable, a    co-occurring capable program is appropriate    [] If not stable, a co-occurring enhanced program is required [] Mild to moderate severity; needs structure to focus on recovery. Treatment should be designed to address significant cognitive deficits     [] If  stable, a  co-occurring capable program is appropriate    [] If not stable, a co-occurring enhanced program is required [] Demonstrates repeated inability to control impulses or unstable & dangerous signs/ symptoms require stabilization. Other functional deficits require stabilization and 24-hr.  setting to prepare for community integration  & continuing care    [] Co-occurring enhanced setting is required for those with severe & chronic mental illness [] Moderate severity;  needs 24-hour   structured setting    [] If client has co-occurring mental disorder, requires concurrent mental health services in a medically monitored setting  [] Severe and unstable problems;  requires 24-hour psychiatric care  with concomitant addiction treatment   COMMENTS:             Staff: Electronically signed by Yolanda Pearce LPC on 7/14/2020 at 10:35 AM                     [address] Placement      [x]Admissions  []Continued Stay []Discharge/Transfer / Complication in [place] Date:7/14/2020    Skylar Parsons      Level of Care Level 1  Outpatient   Services Level 2.1  Intensive  Outpatient  Services Level 2.5  Partial Hospitalization Services Level 3.1  CLINICALLY Managed Low-intensity Residential Services Level 3.3  CLINICALLY Managed Population- Specific High- Intensity Residential Services Level 3.5  CLINICALLY Managed High-Intensive Residential Services Level 3.7  MEDICALLY Monitored Intensive Inpatient Services Level 4  MEDICALLY Managed Intensive Inpatient Services   Dimension 4  Readiness  To  Change [] Ready for recovery but needs motivating and monitoring strategies to strengthen readiness    []Needs ongoing monitoring and disease management    [] High severity in this dimension but not in other dimensions. Needs Level 1 motivational enhancement strategies   [x] Has variable engagement in treatment, ambivalence, or lack of awareness of substance use or mental health problems and requires structured program several times/wk. to promote progress through stages of change [] Has poor engagement in treatment, significant ambivalence, or lack of awareness of substance use or mental health problems, requires near daily structured program or intensive engagement to promote progress through stages of change [] Open to recovery, but needs structured environment to maintain therapeutic gains [] Has little awareness & needs interventions at Level 3.3 to engage & stay in treatment.     [] If there is high severity in this dimension, but not in any other dimension, motivational enhancement strategies should be provided in Level 1 [] Has marked difficulty with, or opposition to treatment with dangerous consequences    [] If there is high severity in this dimension, but not in any other dimension, motivational enhancement strategies should be provided in Level 1 [] Low interest in treatment and impulse control is poor despite negative consequences;  needs motivating strategies only safely available in 24-hour structured setting    [] If there is high severity in this dimension, but not in any other dimension, motivational enhancement strategies should be provided in Level 1 [] Problems in this dimension do not qualify the client for Level 4 services    [] If the client's only severity is in Dimension 4,5, and/or 6 without high severity in Dimensions 1,2, and/or 3, then client is not qualified for Level 4   COMMENTS:           Dimension 5  Relapse, Continued Use or Continued Problem Potential  [x] Able to maintain abstinence or control use and/or addictive behaviors  and pursue recovery or motivational goals with minimal support [] Intensification of addiction or mental health symptoms indicate high likelihood of relapse risk or continued use or continued problems without  close monitoring and support several times/wk.  [] Intensification of addiction or mental health symptoms, despite active participation in a Level 1 or 2.1 program, indicates high likelihood of relapse or continued use or continued problems without near-daily monitoring [] Understands relapse but needs structure to maintain therapeutic gains  [] Has little awareness and needs interventions available only at Level 3.3 to prevent continued use, with imminent dangerous consequences, because of cognitive deficits or  comparable dysfunction  [] Has no recognition  of the skills needed to prevent continued use,  with imminently dangerous  consequences [] Unable to control use, with imminently dangerous consequences,  despite active participation at less intensive levels of care [] Problems in this dimension do not qualify the client for Level 4 services    [] If the client's only severity is in Dimension 4,5, and/or 6 without high severity in Dimensions 1,2, and/or 3, then client is not qualified for Level 4   COMMENTS:           Dimension 6  Recovery/Living Environment [x]  Recovery environment is supportive and/or the client has skills to cope [] Recovery environment is not supportive  but with structure and support, the client can cope [] Recovery environment is not supportive, but with structure and support and relief from the home environment, client can cope [] Environment    is dangerous, but recovery is achievable if Level 3.1 24-hour structure is available  [] Environment is dangerous and  client needs 24-hour structure to learn to cope [] Environment is dangerous, and the client lacks skills to cope outside of a  highly structured 24-hour setting   [] Environment is dangerous, and the client lacks skills to cope outside of a  highly structured 24-hour setting [] Problems in this dimension do not qualify the client for Level 4 services    [] If the client's only severity is in Dimension 4,5, and/or 6 without high severity in Dimensions 1,2, and/or 3, then client is not qualified for Level 4   COMMENTS:               Staff: Electronically signed by Kaia Pham LPC on 7/14/2020 at 10:36 AM

## 2020-07-22 ENCOUNTER — HOSPITAL ENCOUNTER (OUTPATIENT)
Age: 55
Discharge: HOME | End: 2020-07-24
Payer: COMMERCIAL

## 2020-07-22 ENCOUNTER — OFFICE VISIT (OUTPATIENT)
Dept: FAMILY MEDICINE CLINIC | Age: 55
End: 2020-07-22
Payer: COMMERCIAL

## 2020-07-22 VITALS
SYSTOLIC BLOOD PRESSURE: 122 MMHG | BODY MASS INDEX: 21.91 KG/M2 | DIASTOLIC BLOOD PRESSURE: 70 MMHG | TEMPERATURE: 98.3 F | OXYGEN SATURATION: 99 % | HEART RATE: 85 BPM | WEIGHT: 112.2 LBS | RESPIRATION RATE: 18 BRPM

## 2020-07-22 PROBLEM — F10.920 ACUTE ALCOHOLIC INTOXICATION WITHOUT COMPLICATION (HCC): Status: ACTIVE | Noted: 2020-07-22

## 2020-07-22 LAB
ALBUMIN SERPL-MCNC: 3.4 G/DL (ref 3.5–5.2)
ALP BLD-CCNC: 84 U/L (ref 35–104)
ALT SERPL-CCNC: 26 U/L (ref 0–32)
ANION GAP SERPL CALCULATED.3IONS-SCNC: 12 MMOL/L (ref 7–16)
ANISOCYTOSIS: ABNORMAL
AST SERPL-CCNC: 43 U/L (ref 0–31)
BASOPHILS ABSOLUTE: 0.05 E9/L (ref 0–0.2)
BASOPHILS RELATIVE PERCENT: 1.7 % (ref 0–2)
BILIRUB SERPL-MCNC: 0.5 MG/DL (ref 0–1.2)
BLASTS RELATIVE PERCENT: 0.9 % (ref 0–0)
BUN BLDV-MCNC: 13 MG/DL (ref 6–20)
BURR CELLS: ABNORMAL
CALCIUM SERPL-MCNC: 8.7 MG/DL (ref 8.6–10.2)
CHLORIDE BLD-SCNC: 111 MMOL/L (ref 98–107)
CO2: 19 MMOL/L (ref 22–29)
CREAT SERPL-MCNC: 0.8 MG/DL (ref 0.5–1)
EOSINOPHILS ABSOLUTE: 0.21 E9/L (ref 0.05–0.5)
EOSINOPHILS RELATIVE PERCENT: 7 % (ref 0–6)
GFR AFRICAN AMERICAN: >60
GFR NON-AFRICAN AMERICAN: >60 ML/MIN/1.73
GLUCOSE BLD-MCNC: 117 MG/DL (ref 74–99)
HCT VFR BLD CALC: 27.7 % (ref 34–48)
HEMOGLOBIN: 8.4 G/DL (ref 11.5–15.5)
LYMPHOCYTES ABSOLUTE: 0.84 E9/L (ref 1.5–4)
LYMPHOCYTES RELATIVE PERCENT: 27.8 % (ref 20–42)
MAGNESIUM: 1.9 MG/DL (ref 1.6–2.6)
MCH RBC QN AUTO: 35.1 PG (ref 26–35)
MCHC RBC AUTO-ENTMCNC: 30.3 % (ref 32–34.5)
MCV RBC AUTO: 115.9 FL (ref 80–99.9)
MONOCYTES ABSOLUTE: 0.33 E9/L (ref 0.1–0.95)
MONOCYTES RELATIVE PERCENT: 11.3 % (ref 2–12)
NEUTROPHILS ABSOLUTE: 1.53 E9/L (ref 1.8–7.3)
NEUTROPHILS RELATIVE PERCENT: 51.3 % (ref 43–80)
OVALOCYTES: ABNORMAL
PDW BLD-RTO: 17.2 FL (ref 11.5–15)
PLATELET # BLD: 173 E9/L (ref 130–450)
PMV BLD AUTO: 9.4 FL (ref 7–12)
POIKILOCYTES: ABNORMAL
POTASSIUM SERPL-SCNC: 5.1 MMOL/L (ref 3.5–5)
RBC # BLD: 2.39 E12/L (ref 3.5–5.5)
SODIUM BLD-SCNC: 142 MMOL/L (ref 132–146)
TOTAL CK: 96 U/L (ref 20–180)
TOTAL PROTEIN: 6.9 G/DL (ref 6.4–8.3)
WBC # BLD: 3 E9/L (ref 4.5–11.5)

## 2020-07-22 PROCEDURE — 99213 OFFICE O/P EST LOW 20 MIN: CPT | Performed by: FAMILY MEDICINE

## 2020-07-22 PROCEDURE — 3017F COLORECTAL CA SCREEN DOC REV: CPT | Performed by: FAMILY MEDICINE

## 2020-07-22 PROCEDURE — 1036F TOBACCO NON-USER: CPT | Performed by: FAMILY MEDICINE

## 2020-07-22 PROCEDURE — 1111F DSCHRG MED/CURRENT MED MERGE: CPT | Performed by: FAMILY MEDICINE

## 2020-07-22 PROCEDURE — 83735 ASSAY OF MAGNESIUM: CPT

## 2020-07-22 PROCEDURE — G8420 CALC BMI NORM PARAMETERS: HCPCS | Performed by: FAMILY MEDICINE

## 2020-07-22 PROCEDURE — 82550 ASSAY OF CK (CPK): CPT

## 2020-07-22 PROCEDURE — 80053 COMPREHEN METABOLIC PANEL: CPT

## 2020-07-22 PROCEDURE — 85025 COMPLETE CBC W/AUTO DIFF WBC: CPT

## 2020-07-22 PROCEDURE — 36415 COLL VENOUS BLD VENIPUNCTURE: CPT | Performed by: FAMILY MEDICINE

## 2020-07-22 PROCEDURE — G8427 DOCREV CUR MEDS BY ELIG CLIN: HCPCS | Performed by: FAMILY MEDICINE

## 2020-07-22 NOTE — PROGRESS NOTES
7/29/2020    Chief Complaint   Patient presents with    Leg Swelling              Patient Active Problem List    Diagnosis Date Noted    [facility] without complication [facility] 45/24/3797    Prepyloric ulcer, acute 07/07/2020    Non-traumatic rhabdomyolysis 07/07/2020    ETOH abuse 73/84/9714    Alcoholic liver disease ([facility] 38/15/8196    Metabolic acidosis 20/50/0218    WEN (acute kidney injury) ([facility] 07/07/2020    Thrombocytopenia ([facility] 07/07/2020    Moderate protein-calorie malnutrition ([facility] 07/03/2020    Hepatic encephalopathy ([facility] 07/01/2020    Iron deficiency anemia due to chronic blood loss 10/22/2019    Anemia due to GI blood loss 10/22/2019    Anemia 10/19/2019     Subjective    Ensure and yogurt at 4-7 am  Lunch after 9 leftover pizza pasta fruit  Dinner [employer] adam veronica    1. Thrombocytopenia (HCC)    - CBC Auto Differential; Future    2. Iron deficiency anemia due to chronic blood loss    - CBC Auto Differential; Future    3. WEN (acute kidney injury) (HCC)    - CK; Future    4. Alcoholic liver disease ([facility]    - Comprehensive Metabolic Panel; Future  - MAGNESIUM; Future    5.  Acute alcoholic intoxication without complication (HCC)          Goals    None       Goals      Review of Systems     Review Of Systems    General:   No weight change no malaise no fatigue no change in appetite no sleep disturbance nofever/chills no night sweats  Skin:                no abnormal pigmentation, no rash, no scaling,no itching, no Masses,no  hair , no nail changes  Eyes:               no blurring, no diplopia, no  eye pain no glaucoma no cataracts  ENT:                 no hearing loss,no  Tinnitus,no  Vertigo,no osebleed, no nasal congestion, no rhinorrhea, no sore throat , no jaw neisha. no hoarseness,  no bleeding    Neck:     no node tenderness , not rigid, no masses   Respiratory:           no cough, no sputum, No coughing blood, no pleuritic , no chest pain, no dyspnea,  no wheezing  Cardiovascular:         no angina, No chest pain  No syncope, no pedal edema , no orthopnea, no PND, no palpitations, no claudication  Gastrointestinal  no nausea, no vomiting, no heartburn, no diarrhea, no constipation, no bloating,  no abdominal pain, no rectal pain, no bleeding no hemorrhoids, no hernia  no urinary urgency, no frequency, no dysuria, no nocturia, no hesitancy, no  Incontinence, no bleeding, no stones  Musculoskeletal:        no arthritis, no  arthralgia, no myalgia, no  weakness,  no morning stiffness, no joint swelling   Neurologic:                 no paralysis, no paresis, no  paresthesia, no seizures, no  tremors, no headaches, no tumors , no stroke, no speech issues,  No incoordination, no head trauma, no memory loss/concentration  Hematologic:              no anemia, no abnormal bleeding/bruising, no fever, no chills, no night sweats, no wollen glands, no unexplained weight loss  Endocrine:        no heat or cold intolerance and no polyphagia, polydipsia,  or polyuria  Psych:   No depression no anxiety, no suicidal or homicidal thoughts, no irritability, no decreased energy, no trouble falling asleep, no early awakening , no trouble concentrating             Objective:    No family history on file. Past Medical History:   Diagnosis Date    Anemia     ETOH abuse        Social History     Socioeconomic History    Marital status:      Spouse name: Not on file    Number of children: Not on file    Years of education: Not on file    Highest education level: Not on file   Occupational History    Not on file   Social Needs    Financial resource strain: Not on file    Food insecurity     Worry: Not on file     Inability: Not on file    Transportation needs     Medical: Not on file     Non-medical: Not on file   Tobacco Use    Smoking status: Former Smoker    Smokeless tobacco: Never Used   Substance and Sexual Activity    Alcohol use:  Yes     Alcohol/week: 5.0 standard drinks     Types: 5 Standard drinks or equivalent per week     Comment: tequila daily    Drug use: No    Sexual activity: Not on file   Lifestyle    Physical activity     Days per week: Not on file     Minutes per session: Not on file    Stress: Not on file   Relationships    Social connections     Talks on phone: Not on file     Gets together: Not on file     Attends [employer] service: Not on file     Active member of club or organization: Not on file     Attends meetings of clubs or organizations: Not on file     Relationship status: Not on file    Intimate partner violence     Fear of current or ex partner: Not on file     Emotionally abused: Not on file     Physically abused: Not on file     Forced sexual activity: Not on file   Other Topics Concern    Not on file   Social History Narrative    Not on file           Scheduled Meds:  Current Outpatient Medications   Medication Sig Dispense Refill    omeprazole (PRILOSEC) 40 MG delayed release capsule Take 1 capsule by mouth every morning (before breakfast) 30 capsule 5    furosemide (LASIX) 20 MG tablet Take 1 tablet by mouth daily 30 tablet 5    magnesium oxide (MAG-OX) 400 (241.3 Mg) MG TABS tablet Take 1 tablet by mouth daily 30 tablet 1    sertraline (ZOLOFT) 100 MG tablet Take 1.5 tablets by mouth daily 45 tablet 5    spironolactone (ALDACTONE) 25 MG tablet Take 1 tablet by mouth daily 30 tablet 5    ferrous sulfate (CHESTER-AMANDA) 325 (65 Fe) MG tablet Take 1 tablet by mouth daily (with breakfast) 30 tablet 3     No current facility-administered medications for this visit.                 Wt Readings from Last 3 Encounters:   07/22/20 112 lb 3.2 oz (50.9 kg)   07/07/20 122 lb (55.3 kg)   10/20/19 100 lb (45.4 kg)         Vitals:    07/22/20 1147   BP: 122/70   Pulse: 85   Resp: 18   Temp: 98.3 °F (36.8 °C)   SpO2: 99%         Physical Exam    Physical Exam  General Appearance: alert and oriented to person, place and time, well developed and well- nourished, in no acute distress  Skin:    warm and dry, no rash or erythema  Head:    normocephalic and atraumatic  Eyes:    pupils equal, round, and reactive to light, extraocular eye movements intact, conjunctivae normal  ENT:    tympanic membrane, external ear and ear canal normal bilaterally, nose without deformity, nasal mucosa and turbinates     normal without polyps  Neck:    supple and non-tender without mass, no thyromegaly or thyroid nodules, no cervical lymphadenopathy  Pulmonary/Chest:  clear to auscultation bilaterally- no wheezes, rales or rhonchi, normal air movement, no respiratory distress  Cardiovascular:  normal rate, regular rhythm, , no murmurs, rubs, clicks, or gallops, distal pulses intact, no carotid bruits  Abdomen:   soft, non-tender, non-distended, normal bowel sounds, no masses or organomegaly, normal palpation  Extremities:   no cyanosis, clubbing or edema, pedal pulses normal  Musculoskeletal:  normal range of motion, no joint swelling, deformity or tenderness, Back normal, no kyphosis, no cva tenderness,  Head and neck normal rom, shoulders normal strength ,  hips normal , gait normal , no cane no walker , no motor deficits,  Neurologic:   reflexes normal and symmetric, no cranial nerve deficit, gait, coordination and speech normal, A & O x 3, Moves all extremities,  No gross neuro deficits ,normal DTR, normal memory, normal judgement/insight, normal affect                                       Assessment:    1. Thrombocytopenia (HCC)  Stable, cont meds recheck 6 mos    - CBC Auto Differential; Future    2. Iron deficiency anemia due to chronic blood loss  Stable, cont meds recheck 6 mos    - CBC Auto Differential; Future    3. WEN (acute kidney injury) ([facility]  Stable, cont meds recheck 6 mos    - CK; Future    4. Alcoholic liver disease (HCC)  Stable, cont meds recheck 6 mos    - Comprehensive Metabolic Panel; Future  - MAGNESIUM; Future    5.  Acute alcoholic intoxication without complication (HCC)  Stable, cont meds recheck 6 mos                        4. Reviewed labs    5. No follow-ups on file.            Meme Youssef M.D.    7/29/2020

## 2020-10-15 ENCOUNTER — NURSE ONLY (OUTPATIENT)
Dept: FAMILY MEDICINE CLINIC | Age: 55
End: 2020-10-15
Payer: COMMERCIAL

## 2020-10-15 ENCOUNTER — HOSPITAL ENCOUNTER (OUTPATIENT)
Age: 55
Discharge: HOME | End: 2020-10-17
Payer: COMMERCIAL

## 2020-10-15 LAB
ALBUMIN SERPL-MCNC: 4.3 G/DL (ref 3.5–5.2)
ALP BLD-CCNC: 157 U/L (ref 35–104)
ALT SERPL-CCNC: 12 U/L (ref 0–32)
ANION GAP SERPL CALCULATED.3IONS-SCNC: 17 MMOL/L (ref 7–16)
AST SERPL-CCNC: 27 U/L (ref 0–31)
BASOPHILS ABSOLUTE: 0.03 E9/L (ref 0–0.2)
BASOPHILS RELATIVE PERCENT: 0.7 % (ref 0–2)
BILIRUB SERPL-MCNC: 0.5 MG/DL (ref 0–1.2)
BUN BLDV-MCNC: 21 MG/DL (ref 6–20)
CALCIUM SERPL-MCNC: 9.3 MG/DL (ref 8.6–10.2)
CHLORIDE BLD-SCNC: 105 MMOL/L (ref 98–107)
CO2: 17 MMOL/L (ref 22–29)
CREAT SERPL-MCNC: 0.8 MG/DL (ref 0.5–1)
EOSINOPHILS ABSOLUTE: 0.17 E9/L (ref 0.05–0.5)
EOSINOPHILS RELATIVE PERCENT: 3.9 % (ref 0–6)
GFR AFRICAN AMERICAN: >60
GFR NON-AFRICAN AMERICAN: >60 ML/MIN/1.73
GLUCOSE BLD-MCNC: 111 MG/DL (ref 74–99)
HCT VFR BLD CALC: 36.9 % (ref 34–48)
HEMOGLOBIN: 11.1 G/DL (ref 11.5–15.5)
IMMATURE GRANULOCYTES #: 0.01 E9/L
IMMATURE GRANULOCYTES %: 0.2 % (ref 0–5)
IRON SATURATION: 21 % (ref 15–50)
IRON: 95 MCG/DL (ref 37–145)
LYMPHOCYTES ABSOLUTE: 1.15 E9/L (ref 1.5–4)
LYMPHOCYTES RELATIVE PERCENT: 26.1 % (ref 20–42)
MCH RBC QN AUTO: 28.9 PG (ref 26–35)
MCHC RBC AUTO-ENTMCNC: 30.1 % (ref 32–34.5)
MCV RBC AUTO: 96.1 FL (ref 80–99.9)
MONOCYTES ABSOLUTE: 0.53 E9/L (ref 0.1–0.95)
MONOCYTES RELATIVE PERCENT: 12 % (ref 2–12)
NEUTROPHILS ABSOLUTE: 2.52 E9/L (ref 1.8–7.3)
NEUTROPHILS RELATIVE PERCENT: 57.1 % (ref 43–80)
PDW BLD-RTO: 15 FL (ref 11.5–15)
PLATELET # BLD: 199 E9/L (ref 130–450)
PMV BLD AUTO: 9.8 FL (ref 7–12)
POTASSIUM SERPL-SCNC: 4.8 MMOL/L (ref 3.5–5)
RBC # BLD: 3.84 E12/L (ref 3.5–5.5)
SODIUM BLD-SCNC: 139 MMOL/L (ref 132–146)
TOTAL CK: 49 U/L (ref 20–180)
TOTAL IRON BINDING CAPACITY: 445 MCG/DL (ref 250–450)
TOTAL PROTEIN: 8.8 G/DL (ref 6.4–8.3)
TSH SERPL DL<=0.05 MIU/L-ACNC: 4.06 UIU/ML (ref 0.27–4.2)
VITAMIN B-12: 556 PG/ML (ref 211–946)
VITAMIN D 25-HYDROXY: 22 NG/ML (ref 30–100)
WBC # BLD: 4.4 E9/L (ref 4.5–11.5)

## 2020-10-15 PROCEDURE — 80053 COMPREHEN METABOLIC PANEL: CPT

## 2020-10-15 PROCEDURE — 82607 VITAMIN B-12: CPT

## 2020-10-15 PROCEDURE — 84443 ASSAY THYROID STIM HORMONE: CPT

## 2020-10-15 PROCEDURE — 82306 VITAMIN D 25 HYDROXY: CPT

## 2020-10-15 PROCEDURE — 90686 IIV4 VACC NO PRSV 0.5 ML IM: CPT | Performed by: FAMILY MEDICINE

## 2020-10-15 PROCEDURE — 90471 IMMUNIZATION ADMIN: CPT | Performed by: FAMILY MEDICINE

## 2020-10-15 PROCEDURE — 82550 ASSAY OF CK (CPK): CPT

## 2020-10-15 PROCEDURE — 85025 COMPLETE CBC W/AUTO DIFF WBC: CPT

## 2020-10-15 PROCEDURE — 83540 ASSAY OF IRON: CPT

## 2020-10-15 PROCEDURE — 83550 IRON BINDING TEST: CPT

## 2020-11-10 RX ORDER — SERTRALINE HYDROCHLORIDE 100 MG/1
150 TABLET, FILM COATED ORAL DAILY
Qty: 45 TABLET | Refills: 5 | Status: SHIPPED
Start: 2020-11-10 | End: 2021-05-16 | Stop reason: SDUPTHER

## 2020-12-09 NOTE — CONSULTS
[address] [address]                                  CONSULTATION    PATIENT NAME: Charlette Weller                     :        1965  MED REC NO:   [id number]                            ROOM:       0211  ACCOUNT NO:   [de-identified]                           ADMIT DATE: 2020  PROVIDER:     Po Graham MD    CONSULT DATE:  2020    REFERRING PROVIDER:  Alon Hope MD    HISTORY OF PRESENT ILLNESS:  She is 48years of age. She has a history  of heavy drinking over time. She was hospitalized here last October  with a profound anemia with a hemoglobin of 4.1 and normochromic  normocytic indices. There had been no recognized bleeding. She had  ascites as well as mildly abnormal liver function studies. At that  time, she had a bilirubin of 1.5 and albumin of 2.9. Eight months prior  to that, her liver function studies were normal with the exception of an  AST of 95. She had had an MCV of 131 in 2019, but her B12 and folate  levels were normal.  When she was hospitalized in 10/2019 with her  hemoglobin of 4.1, her MCV was 95, and iron studies were not diagnostic  of iron deficiency. Her platelet count was normal.  Imaging was  consistent with ascites, but there was no clear evidence of cirrhosis,  and her spleen was of normal size. Endoscopic evaluation revealed the  absence of varices and a gastric and duodenal ulcer which were  Helicobacter negative. She seemingly became abstinent from alcohol. She had an ultrasound  which was normal but confirmed ascites in November. A FibroScan  disclosed a normal liver without cirrhosis. Paracentesis was consistent  with a transudative fluid without complication. She remained abstinent  on modest doses of diuretics; and when she was reevaluated in March, she  gained 10 pounds of dry weight, was devoid of ascites, and had basically  normal lab work. rate.  Regular rhythm. No audible  murmur, no gallop. ABDOMEN:  Soft abdomen. No clinical ascites. EXTREMITIES:  No dependent edema. IMAGING:  CAT scan on this occasion shows no ascites and a normal  spleen. Done with no contrast.  Multiple gallstones and a calcified  wall consistent with a porcelain gallbladder. ASSESSMENT:  She shows evidence of rhabdomyolysis but why is not clear. There is no confirmed evidence that she has been drinking again. This  onset seems abrupt within a matter of days. She is clearly bleeding. This is not going to be variceal bleeding. Known ulcer disease in the  past.  Hemodynamically stable. At this point, endoscopic evaluation is important but can be delayed. She is stable. She has rhabdomyolysis, and whether it has a role in her  renal function is uncertain, but she is urinating well and her  creatinine is falling, so mostly volume contraction and bleeding I  believe. She has an elevated serum ammonia level, but she does not have  evidence of fixed portal hypertension, which is a requirement for  hepatic encephalopathy unless liver injury is so severe, which it is  not. INR is only 1.4. PLAN:  I think the octreotide can be stopped. This is not variceal  bleeding. Antibiotics in this situation are appropriate. Hydration. Treat this as hepatic encephalopathy, although I am not  certain that  is the case despite the elevated ammonia level. Follow and observe.         Ky Walker MD    D: 07/01/2020 20:24:39       T: 07/01/2020 20:31:53     /[email or web address]  Job#: [id number]     Doc#: [id number]    CC: Admitted

## 2021-01-07 DIAGNOSIS — K25.3: ICD-10-CM

## 2021-01-07 DIAGNOSIS — K70.9 ALCOHOLIC LIVER DISEASE (HCC): ICD-10-CM

## 2021-01-07 RX ORDER — SPIRONOLACTONE 50 MG/1
100 TABLET, FILM COATED ORAL DAILY
Qty: 60 TABLET | Refills: 5 | Status: SHIPPED
Start: 2021-01-07 | End: 2021-07-06

## 2021-01-07 RX ORDER — OMEPRAZOLE 40 MG/1
40 CAPSULE, DELAYED RELEASE ORAL
Qty: 30 CAPSULE | Refills: 5 | Status: SHIPPED
Start: 2021-01-07 | End: 2021-06-07

## 2021-01-07 RX ORDER — FUROSEMIDE 20 MG/1
20 TABLET ORAL DAILY
Qty: 30 TABLET | Refills: 5 | Status: SHIPPED
Start: 2021-01-07 | End: 2021-06-07

## 2021-01-07 NOTE — TELEPHONE ENCOUNTER
Refill request for omeprazole, lasix, magnesium oxide and spironolactone. Dr. Nathanael Cat was prescribing the spironolactone but she is requesting that you do.   No apt scheduled

## 2021-03-03 DIAGNOSIS — K70.9 ALCOHOLIC LIVER DISEASE (HCC): Primary | ICD-10-CM

## 2021-03-03 DIAGNOSIS — N17.9 AKI (ACUTE KIDNEY INJURY) (HCC): ICD-10-CM

## 2021-03-03 DIAGNOSIS — D69.6 THROMBOCYTOPENIA (HCC): ICD-10-CM

## 2021-03-03 DIAGNOSIS — D50.0 IRON DEFICIENCY ANEMIA DUE TO CHRONIC BLOOD LOSS: ICD-10-CM

## 2021-03-08 RX ORDER — FERROUS SULFATE 325(65) MG
325 TABLET ORAL
Qty: 30 TABLET | Refills: 3 | Status: SHIPPED
Start: 2021-03-08 | End: 2021-03-15 | Stop reason: SDUPTHER

## 2021-03-10 DIAGNOSIS — N17.9 AKI (ACUTE KIDNEY INJURY) (HCC): ICD-10-CM

## 2021-03-10 DIAGNOSIS — K70.9 ALCOHOLIC LIVER DISEASE (HCC): ICD-10-CM

## 2021-03-10 DIAGNOSIS — D69.6 THROMBOCYTOPENIA (HCC): ICD-10-CM

## 2021-03-10 DIAGNOSIS — D50.0 IRON DEFICIENCY ANEMIA DUE TO CHRONIC BLOOD LOSS: ICD-10-CM

## 2021-03-10 LAB
ALBUMIN SERPL-MCNC: 3.8 G/DL (ref 3.5–5.2)
ALP BLD-CCNC: 89 U/L (ref 35–104)
ALT SERPL-CCNC: 28 U/L (ref 0–32)
AMMONIA: 30 UMOL/L (ref 11–51)
ANION GAP SERPL CALCULATED.3IONS-SCNC: 12 MMOL/L (ref 7–16)
ANISOCYTOSIS: ABNORMAL
AST SERPL-CCNC: 135 U/L (ref 0–31)
BASOPHILS ABSOLUTE: 0.03 E9/L (ref 0–0.2)
BASOPHILS RELATIVE PERCENT: 0.9 % (ref 0–2)
BILIRUB SERPL-MCNC: 1.4 MG/DL (ref 0–1.2)
BUN BLDV-MCNC: 8 MG/DL (ref 6–20)
CALCIUM SERPL-MCNC: 8.3 MG/DL (ref 8.6–10.2)
CHLORIDE BLD-SCNC: 101 MMOL/L (ref 98–107)
CO2: 24 MMOL/L (ref 22–29)
CREAT SERPL-MCNC: 0.7 MG/DL (ref 0.5–1)
EOSINOPHILS ABSOLUTE: 0.03 E9/L (ref 0.05–0.5)
EOSINOPHILS RELATIVE PERCENT: 0.9 % (ref 0–6)
FERRITIN: 97 NG/ML
FOLATE: 4.1 NG/ML (ref 4.8–24.2)
GFR AFRICAN AMERICAN: >60
GFR NON-AFRICAN AMERICAN: >60 ML/MIN/1.73
GLUCOSE BLD-MCNC: 103 MG/DL (ref 74–99)
HCT VFR BLD CALC: 35.3 % (ref 34–48)
HEMOGLOBIN: 11.3 G/DL (ref 11.5–15.5)
IRON SATURATION: 25 % (ref 15–50)
IRON: 74 MCG/DL (ref 37–145)
LYMPHOCYTES ABSOLUTE: 0.37 E9/L (ref 1.5–4)
LYMPHOCYTES RELATIVE PERCENT: 12.2 % (ref 20–42)
MAGNESIUM: 1.4 MG/DL (ref 1.6–2.6)
MCH RBC QN AUTO: 37.8 PG (ref 26–35)
MCHC RBC AUTO-ENTMCNC: 32 % (ref 32–34.5)
MCV RBC AUTO: 118.1 FL (ref 80–99.9)
METAMYELOCYTES RELATIVE PERCENT: 0.9 % (ref 0–1)
MONOCYTES ABSOLUTE: 0.37 E9/L (ref 0.1–0.95)
MONOCYTES RELATIVE PERCENT: 12.2 % (ref 2–12)
NEUTROPHILS ABSOLUTE: 2.29 E9/L (ref 1.8–7.3)
NEUTROPHILS RELATIVE PERCENT: 73 % (ref 43–80)
OVALOCYTES: ABNORMAL
PDW BLD-RTO: 15.2 FL (ref 11.5–15)
PHOSPHORUS: 3.5 MG/DL (ref 2.5–4.5)
PLATELET # BLD: 141 E9/L (ref 130–450)
PMV BLD AUTO: 9.8 FL (ref 7–12)
POIKILOCYTES: ABNORMAL
POLYCHROMASIA: ABNORMAL
POTASSIUM SERPL-SCNC: 4.4 MMOL/L (ref 3.5–5)
RBC # BLD: 2.99 E12/L (ref 3.5–5.5)
SODIUM BLD-SCNC: 137 MMOL/L (ref 132–146)
TOTAL CK: 38 U/L (ref 20–180)
TOTAL IRON BINDING CAPACITY: 298 MCG/DL (ref 250–450)
TOTAL PROTEIN: 7.9 G/DL (ref 6.4–8.3)
TSH SERPL DL<=0.05 MIU/L-ACNC: 5.85 UIU/ML (ref 0.27–4.2)
VITAMIN B-12: 739 PG/ML (ref 211–946)
WBC # BLD: 3.1 E9/L (ref 4.5–11.5)

## 2021-03-15 RX ORDER — FERROUS SULFATE 325(65) MG
325 TABLET ORAL
Qty: 30 TABLET | Refills: 3 | Status: SHIPPED
Start: 2021-03-15 | End: 2021-06-07

## 2021-03-22 ENCOUNTER — IMMUNIZATION (OUTPATIENT)
Dept: PRIMARY CARE CLINIC | Age: 56
End: 2021-03-22
Payer: COMMERCIAL

## 2021-03-22 PROCEDURE — 0001A COVID-19, PFIZER VACCINE 30MCG/0.3ML DOSE: CPT | Performed by: CLINICAL NURSE SPECIALIST

## 2021-03-22 PROCEDURE — 91300 COVID-19, PFIZER VACCINE 30MCG/0.3ML DOSE: CPT | Performed by: CLINICAL NURSE SPECIALIST

## 2021-04-19 ENCOUNTER — IMMUNIZATION (OUTPATIENT)
Dept: PRIMARY CARE CLINIC | Age: 56
End: 2021-04-19
Payer: COMMERCIAL

## 2021-04-19 PROCEDURE — 91300 COVID-19, PFIZER VACCINE 30MCG/0.3ML DOSE: CPT | Performed by: INTERNAL MEDICINE

## 2021-04-19 PROCEDURE — 0002A COVID-19, PFIZER VACCINE 30MCG/0.3ML DOSE: CPT | Performed by: INTERNAL MEDICINE

## 2021-04-26 ENCOUNTER — OFFICE VISIT (OUTPATIENT)
Dept: FAMILY MEDICINE CLINIC | Age: 56
End: 2021-04-26
Payer: COMMERCIAL

## 2021-04-26 VITALS
BODY MASS INDEX: 16.99 KG/M2 | SYSTOLIC BLOOD PRESSURE: 90 MMHG | RESPIRATION RATE: 16 BRPM | TEMPERATURE: 97.4 F | DIASTOLIC BLOOD PRESSURE: 70 MMHG | WEIGHT: 87 LBS | HEART RATE: 119 BPM | OXYGEN SATURATION: 98 %

## 2021-04-26 DIAGNOSIS — K70.9 ALCOHOLIC LIVER DISEASE (HCC): ICD-10-CM

## 2021-04-26 DIAGNOSIS — E03.9 ACQUIRED HYPOTHYROIDISM: ICD-10-CM

## 2021-04-26 DIAGNOSIS — E03.9 ACQUIRED HYPOTHYROIDISM: Primary | ICD-10-CM

## 2021-04-26 DIAGNOSIS — R63.4 WEIGHT LOSS, NON-INTENTIONAL: ICD-10-CM

## 2021-04-26 DIAGNOSIS — E43 SEVERE PROTEIN-CALORIE MALNUTRITION (HCC): ICD-10-CM

## 2021-04-26 PROBLEM — K76.82 HEPATIC ENCEPHALOPATHY: Status: RESOLVED | Noted: 2020-07-01 | Resolved: 2021-04-26

## 2021-04-26 LAB — TSH SERPL DL<=0.05 MIU/L-ACNC: 3.92 UIU/ML (ref 0.27–4.2)

## 2021-04-26 PROCEDURE — G8419 CALC BMI OUT NRM PARAM NOF/U: HCPCS | Performed by: FAMILY MEDICINE

## 2021-04-26 PROCEDURE — G8427 DOCREV CUR MEDS BY ELIG CLIN: HCPCS | Performed by: FAMILY MEDICINE

## 2021-04-26 PROCEDURE — 3017F COLORECTAL CA SCREEN DOC REV: CPT | Performed by: FAMILY MEDICINE

## 2021-04-26 PROCEDURE — 99213 OFFICE O/P EST LOW 20 MIN: CPT | Performed by: FAMILY MEDICINE

## 2021-04-26 PROCEDURE — 1036F TOBACCO NON-USER: CPT | Performed by: FAMILY MEDICINE

## 2021-04-26 SDOH — ECONOMIC STABILITY: TRANSPORTATION INSECURITY
IN THE PAST 12 MONTHS, HAS THE LACK OF TRANSPORTATION KEPT YOU FROM MEDICAL APPOINTMENTS OR FROM GETTING MEDICATIONS?: YES

## 2021-04-26 ASSESSMENT — ENCOUNTER SYMPTOMS
CONSTIPATION: 0
NAUSEA: 0
TROUBLE SWALLOWING: 0
VOMITING: 0
DIARRHEA: 0

## 2021-04-26 ASSESSMENT — PATIENT HEALTH QUESTIONNAIRE - PHQ9
SUM OF ALL RESPONSES TO PHQ QUESTIONS 1-9: 2
SUM OF ALL RESPONSES TO PHQ9 QUESTIONS 1 & 2: 2
2. FEELING DOWN, DEPRESSED OR HOPELESS: 1

## 2021-04-26 NOTE — PROGRESS NOTES
No flowsheet data found. Interpretation of SAFIA-7 score: 5-9 = mild anxiety, 10-14 = moderate anxiety, 15+ = severe anxiety. Recommend referral to behavioral health for scores 10 or greater. 2021        Bonnie Isaacs: 1965 IS A 55 y.o. female ,Established patient, here for eval of Anorexia          Current Outpatient Medications   Medication Sig Dispense Refill    magnesium oxide (MAG-OX) 400 (241.3 Mg) MG TABS tablet Take 1 tablet by mouth daily 30 tablet 3    ferrous sulfate (CHESTER-AMANDA) 325 (65 Fe) MG tablet Take 1 tablet by mouth daily (with breakfast) 30 tablet 3    furosemide (LASIX) 20 MG tablet Take 1 tablet by mouth daily 30 tablet 5    omeprazole (PRILOSEC) 40 MG delayed release capsule Take 1 capsule by mouth every morning (before breakfast) 30 capsule 5    spironolactone (ALDACTONE) 50 MG tablet Take 2 tablets by mouth daily 60 tablet 5    sertraline (ZOLOFT) 100 MG tablet Take 1.5 tablets by mouth daily 45 tablet 5     No current facility-administered medications for this visit. No taste  No smell not eating because she is not hungry  Fatigued  Did have 1st vaccine and  Lab work which shows  hgb has increased 11.3 mcv still hig at 118  ast down to 135 bili 1.4            ASSESSMENT / PLAN      1. Acquired hypothyroidism    - TSH without Reflex; Future    2. Alcoholic liver disease ([facility]  Back to see DR Nixon Johnson  3. Weight loss, non-intentional  Has no hunger  signal    4. Severe protein-calorie malnutrition ([facility]  See above                SUBJECTIVE    Review of Systems   Constitutional: Positive for fatigue. Negative for activity change and appetite change. HENT: Negative for ear pain and trouble swallowing. Decreased sense of  taste or smell   Cardiovascular: Negative for chest pain. Gastrointestinal: Negative for constipation, diarrhea, nausea and vomiting. Skin: Negative. Psychiatric/Behavioral: Negative for sleep disturbance.  The patient is nervous/anxious. OBJECTIVE    Wt Readings from Last 3 Encounters:   04/26/21 87 lb (39.5 kg)   07/22/20 112 lb 3.2 oz (50.9 kg)   07/07/20 122 lb (55.3 kg)       Vitals:    04/26/21 1459   BP: 90/70   Pulse: 119   Resp: 16   Temp: 97.4 °F (36.3 °C)   SpO2: 98%       Physical Exam  Constitutional:       Appearance: She is ill-appearing. Neck:      Musculoskeletal: Normal range of motion. Cardiovascular:      Rate and Rhythm: Normal rate and regular rhythm. Pulses: Normal pulses. Heart sounds: Normal heart sounds. Pulmonary:      Effort: Pulmonary effort is normal.      Breath sounds: Normal breath sounds. Abdominal:      General: Bowel sounds are normal.      Palpations: Abdomen is soft. Comments: hepatomegaly   Neurological:      General: No focal deficit present. Mental Status: She is alert. No follow-ups on file. An electronic signature was used to authenticate this note.     Dale Coats BobovnyikMD    4/30/2021

## 2021-05-01 PROBLEM — R63.4 WEIGHT LOSS, NON-INTENTIONAL: Status: ACTIVE | Noted: 2021-05-01

## 2021-05-01 PROBLEM — E43 SEVERE PROTEIN-CALORIE MALNUTRITION (HCC): Status: ACTIVE | Noted: 2020-07-03

## 2021-05-01 PROBLEM — E03.9 ACQUIRED HYPOTHYROIDISM: Status: ACTIVE | Noted: 2021-05-01

## 2021-05-13 RX ORDER — SERTRALINE HYDROCHLORIDE 100 MG/1
TABLET, FILM COATED ORAL
Qty: 45 TABLET | Refills: 0 | OUTPATIENT
Start: 2021-05-13

## 2021-05-16 RX ORDER — SERTRALINE HYDROCHLORIDE 100 MG/1
150 TABLET, FILM COATED ORAL DAILY
Qty: 45 TABLET | Refills: 5 | Status: SHIPPED
Start: 2021-05-16 | End: 2021-11-15

## 2021-05-17 RX ORDER — SERTRALINE HYDROCHLORIDE 100 MG/1
TABLET, FILM COATED ORAL
Qty: 45 TABLET | Refills: 0 | OUTPATIENT
Start: 2021-05-17

## 2021-06-07 DIAGNOSIS — K25.3: ICD-10-CM

## 2021-06-07 DIAGNOSIS — K70.9 ALCOHOLIC LIVER DISEASE (HCC): ICD-10-CM

## 2021-06-07 RX ORDER — MAGNESIUM OXIDE 400 MG/1
TABLET ORAL
Qty: 30 TABLET | Refills: 5 | Status: SHIPPED
Start: 2021-06-07 | End: 2022-04-06 | Stop reason: SDUPTHER

## 2021-06-07 RX ORDER — FUROSEMIDE 20 MG/1
TABLET ORAL
Qty: 30 TABLET | Refills: 5 | Status: SHIPPED
Start: 2021-06-07 | End: 2022-04-06 | Stop reason: SDUPTHER

## 2021-06-07 RX ORDER — FERROUS SULFATE 325(65) MG
TABLET ORAL
Qty: 30 TABLET | Refills: 5 | Status: SHIPPED
Start: 2021-06-07 | End: 2022-04-06 | Stop reason: SDUPTHER

## 2021-06-07 RX ORDER — OMEPRAZOLE 40 MG/1
CAPSULE, DELAYED RELEASE ORAL
Qty: 30 CAPSULE | Refills: 5 | Status: SHIPPED
Start: 2021-06-07 | End: 2022-04-06 | Stop reason: SDUPTHER

## 2021-07-06 RX ORDER — SPIRONOLACTONE 50 MG/1
TABLET, FILM COATED ORAL
Qty: 60 TABLET | Refills: 0 | Status: SHIPPED
Start: 2021-07-06 | End: 2021-08-04

## 2021-08-04 RX ORDER — SPIRONOLACTONE 50 MG/1
TABLET, FILM COATED ORAL
Qty: 60 TABLET | Refills: 0 | Status: SHIPPED
Start: 2021-08-04 | End: 2021-09-09

## 2021-09-07 DIAGNOSIS — K70.9 ALCOHOLIC LIVER DISEASE (HCC): Primary | ICD-10-CM

## 2021-09-07 DIAGNOSIS — D50.0 IRON DEFICIENCY ANEMIA DUE TO CHRONIC BLOOD LOSS: ICD-10-CM

## 2021-09-07 DIAGNOSIS — R63.4 WEIGHT LOSS, NON-INTENTIONAL: ICD-10-CM

## 2021-09-07 DIAGNOSIS — R79.89 ELEVATED LFTS: ICD-10-CM

## 2021-09-07 DIAGNOSIS — E03.9 ACQUIRED HYPOTHYROIDISM: ICD-10-CM

## 2021-09-07 DIAGNOSIS — E43 SEVERE PROTEIN-CALORIE MALNUTRITION (HCC): ICD-10-CM

## 2021-09-07 DIAGNOSIS — D69.6 THROMBOCYTOPENIA (HCC): ICD-10-CM

## 2021-09-09 RX ORDER — SPIRONOLACTONE 50 MG/1
TABLET, FILM COATED ORAL
Qty: 60 TABLET | Refills: 0 | Status: SHIPPED
Start: 2021-09-09 | End: 2021-11-15

## 2021-11-15 RX ORDER — SPIRONOLACTONE 50 MG/1
TABLET, FILM COATED ORAL
Qty: 60 TABLET | Refills: 0 | Status: SHIPPED
Start: 2021-11-15 | End: 2022-04-06 | Stop reason: SDUPTHER

## 2021-11-15 RX ORDER — SERTRALINE HYDROCHLORIDE 100 MG/1
TABLET, FILM COATED ORAL
Qty: 45 TABLET | Refills: 0 | Status: SHIPPED
Start: 2021-11-15 | End: 2022-01-13 | Stop reason: SDUPTHER

## 2021-11-15 NOTE — TELEPHONE ENCOUNTER
Medication Refill Request    LOV 4/26/2021  NOV Visit date not found    Lab Results   Component Value Date    CREATININE 0.8 10/11/2021

## 2022-01-13 ENCOUNTER — TELEPHONE (OUTPATIENT)
Dept: FAMILY MEDICINE CLINIC | Age: 57
End: 2022-01-13

## 2022-01-13 RX ORDER — SERTRALINE HYDROCHLORIDE 100 MG/1
150 TABLET, FILM COATED ORAL DAILY
Qty: 45 TABLET | Refills: 0 | Status: SHIPPED
Start: 2022-01-13 | End: 2022-01-16 | Stop reason: SDUPTHER

## 2022-01-13 RX ORDER — SERTRALINE HYDROCHLORIDE 100 MG/1
150 TABLET, FILM COATED ORAL DAILY
Qty: 45 TABLET | Refills: 0 | Status: SHIPPED
Start: 2022-01-13 | End: 2022-01-13 | Stop reason: SDUPTHER

## 2022-01-16 RX ORDER — SERTRALINE HYDROCHLORIDE 100 MG/1
150 TABLET, FILM COATED ORAL DAILY
Qty: 45 TABLET | Refills: 5 | Status: SHIPPED
Start: 2022-01-16 | End: 2022-04-06 | Stop reason: SDUPTHER

## 2022-01-18 ENCOUNTER — TELEPHONE (OUTPATIENT)
Dept: FAMILY MEDICINE CLINIC | Age: 57
End: 2022-01-18

## 2022-01-18 NOTE — TELEPHONE ENCOUNTER
[place] can not fill Rx because [address] filled Rx. Rx canceled at [address] [employer].

## 2022-02-09 ENCOUNTER — TELEPHONE (OUTPATIENT)
Dept: FAMILY MEDICINE CLINIC | Age: 57
End: 2022-02-09

## 2022-02-09 DIAGNOSIS — N17.9 AKI (ACUTE KIDNEY INJURY) (HCC): ICD-10-CM

## 2022-02-09 DIAGNOSIS — D50.0 IRON DEFICIENCY ANEMIA DUE TO CHRONIC BLOOD LOSS: ICD-10-CM

## 2022-02-09 DIAGNOSIS — E03.9 ACQUIRED HYPOTHYROIDISM: ICD-10-CM

## 2022-02-09 DIAGNOSIS — K70.9 ALCOHOLIC LIVER DISEASE (HCC): ICD-10-CM

## 2022-02-09 DIAGNOSIS — D50.8 IRON DEFICIENCY ANEMIA SECONDARY TO INADEQUATE DIETARY IRON INTAKE: Primary | ICD-10-CM

## 2022-02-09 DIAGNOSIS — D50.8 IRON DEFICIENCY ANEMIA SECONDARY TO INADEQUATE DIETARY IRON INTAKE: ICD-10-CM

## 2022-02-09 LAB
ALBUMIN SERPL-MCNC: 3.2 G/DL (ref 3.5–5.2)
ALP BLD-CCNC: 107 U/L (ref 35–104)
ALT SERPL-CCNC: 31 U/L (ref 0–32)
AMMONIA: 33 UMOL/L (ref 11–51)
ANION GAP SERPL CALCULATED.3IONS-SCNC: 15 MMOL/L (ref 7–16)
ANISOCYTOSIS: ABNORMAL
AST SERPL-CCNC: 127 U/L (ref 0–31)
BASOPHILS ABSOLUTE: 0 E9/L (ref 0–0.2)
BASOPHILS RELATIVE PERCENT: 0.8 % (ref 0–2)
BILIRUB SERPL-MCNC: 1.8 MG/DL (ref 0–1.2)
BUN BLDV-MCNC: 15 MG/DL (ref 6–20)
CALCIUM SERPL-MCNC: 8.2 MG/DL (ref 8.6–10.2)
CHLORIDE BLD-SCNC: 98 MMOL/L (ref 98–107)
CO2: 18 MMOL/L (ref 22–29)
CREAT SERPL-MCNC: 0.9 MG/DL (ref 0.5–1)
EOSINOPHILS ABSOLUTE: 0.03 E9/L (ref 0.05–0.5)
EOSINOPHILS RELATIVE PERCENT: 0.9 % (ref 0–6)
GFR AFRICAN AMERICAN: >60
GFR NON-AFRICAN AMERICAN: >60 ML/MIN/1.73
GLUCOSE BLD-MCNC: 91 MG/DL (ref 74–99)
HCT VFR BLD CALC: 24.9 % (ref 34–48)
HEMOGLOBIN: 8 G/DL (ref 11.5–15.5)
IRON % SATURATION: 61 % (ref 15–50)
IRON: 134 MCG/DL (ref 37–145)
LYMPHOCYTES ABSOLUTE: 0.68 E9/L (ref 1.5–4)
LYMPHOCYTES RELATIVE PERCENT: 19.3 % (ref 20–42)
MAGNESIUM: 2.1 MG/DL (ref 1.6–2.6)
MCH RBC QN AUTO: 42.3 PG (ref 26–35)
MCHC RBC AUTO-ENTMCNC: 32.1 % (ref 32–34.5)
MCV RBC AUTO: 131.7 FL (ref 80–99.9)
MONOCYTES ABSOLUTE: 0.14 E9/L (ref 0.1–0.95)
MONOCYTES RELATIVE PERCENT: 3.5 % (ref 2–12)
MYELOCYTE PERCENT: 0.9 % (ref 0–0)
NEUTROPHILS ABSOLUTE: 2.74 E9/L (ref 1.8–7.3)
NEUTROPHILS RELATIVE PERCENT: 75.4 % (ref 43–80)
OVALOCYTES: ABNORMAL
PDW BLD-RTO: 17.6 FL (ref 11.5–15)
PLATELET # BLD: 156 E9/L (ref 130–450)
PMV BLD AUTO: 9.3 FL (ref 7–12)
POIKILOCYTES: ABNORMAL
POLYCHROMASIA: ABNORMAL
POTASSIUM SERPL-SCNC: 3.9 MMOL/L (ref 3.5–5)
RBC # BLD: 1.89 E12/L (ref 3.5–5.5)
SODIUM BLD-SCNC: 131 MMOL/L (ref 132–146)
TEAR DROP CELLS: ABNORMAL
TOTAL IRON BINDING CAPACITY: 221 MCG/DL (ref 250–450)
TOTAL PROTEIN: 7.7 G/DL (ref 6.4–8.3)
TSH SERPL DL<=0.05 MIU/L-ACNC: 3.5 UIU/ML (ref 0.27–4.2)
WBC # BLD: 3.6 E9/L (ref 4.5–11.5)

## 2022-02-09 NOTE — TELEPHONE ENCOUNTER
Requesting office notes, med list, and any history for surgery 02/11/22. Being requested by anesthesia.    Fax  [phone]

## 2022-02-10 DIAGNOSIS — K70.9 ALCOHOLIC LIVER DISEASE (HCC): ICD-10-CM

## 2022-02-10 DIAGNOSIS — K25.3: ICD-10-CM

## 2022-02-10 RX ORDER — FUROSEMIDE 20 MG/1
TABLET ORAL
Qty: 30 TABLET | Refills: 0 | OUTPATIENT
Start: 2022-02-10

## 2022-02-10 RX ORDER — FERROUS SULFATE 325(65) MG
TABLET ORAL
Qty: 30 TABLET | Refills: 0 | OUTPATIENT
Start: 2022-02-10

## 2022-02-10 RX ORDER — OMEPRAZOLE 40 MG/1
CAPSULE, DELAYED RELEASE ORAL
Qty: 30 CAPSULE | Refills: 0 | OUTPATIENT
Start: 2022-02-10

## 2022-02-22 ENCOUNTER — TELEPHONE (OUTPATIENT)
Dept: FAMILY MEDICINE CLINIC | Age: 57
End: 2022-02-22

## 2022-02-22 DIAGNOSIS — K70.9 ALCOHOLIC LIVER DISEASE (HCC): Primary | ICD-10-CM

## 2022-02-22 NOTE — TELEPHONE ENCOUNTER
Would Dr Jenny Webster be able to see her next week  He has seen before but ot for a while.   Just needs f/u  No big change

## 2022-03-12 DIAGNOSIS — K25.3: ICD-10-CM

## 2022-03-12 DIAGNOSIS — K70.9 ALCOHOLIC LIVER DISEASE (HCC): ICD-10-CM

## 2022-03-14 RX ORDER — FUROSEMIDE 20 MG/1
TABLET ORAL
Qty: 30 TABLET | Refills: 0 | OUTPATIENT
Start: 2022-03-14

## 2022-03-14 RX ORDER — OMEPRAZOLE 40 MG/1
CAPSULE, DELAYED RELEASE ORAL
Qty: 30 CAPSULE | Refills: 0 | OUTPATIENT
Start: 2022-03-14

## 2022-03-14 RX ORDER — FERROUS SULFATE 325(65) MG
TABLET ORAL
Qty: 30 TABLET | Refills: 0 | OUTPATIENT
Start: 2022-03-14

## 2022-03-23 DIAGNOSIS — M54.50 ACUTE MIDLINE LOW BACK PAIN WITHOUT SCIATICA: Primary | ICD-10-CM

## 2022-03-23 RX ORDER — HYDROCODONE BITARTRATE AND ACETAMINOPHEN 5; 325 MG/1; MG/1
1 TABLET ORAL EVERY 4 HOURS PRN
Qty: 30 TABLET | Refills: 0 | Status: SHIPPED | OUTPATIENT
Start: 2022-03-23 | End: 2022-03-28

## 2022-03-24 ENCOUNTER — APPOINTMENT (OUTPATIENT)
Dept: GENERAL RADIOLOGY | Age: 57
End: 2022-03-24
Payer: COMMERCIAL

## 2022-03-24 ENCOUNTER — HOSPITAL ENCOUNTER (EMERGENCY)
Age: 57
Discharge: HOME | End: 2022-03-24
Attending: STUDENT IN AN ORGANIZED HEALTH CARE EDUCATION/TRAINING PROGRAM
Payer: COMMERCIAL

## 2022-03-24 VITALS
BODY MASS INDEX: 17.08 KG/M2 | HEART RATE: 105 BPM | RESPIRATION RATE: 16 BRPM | HEIGHT: 60 IN | WEIGHT: 87 LBS | SYSTOLIC BLOOD PRESSURE: 114 MMHG | DIASTOLIC BLOOD PRESSURE: 66 MMHG | OXYGEN SATURATION: 100 %

## 2022-03-24 DIAGNOSIS — S52.542A CLOSED SMITH'S FRACTURE OF LEFT RADIUS, INITIAL ENCOUNTER: Primary | ICD-10-CM

## 2022-03-24 PROCEDURE — 73110 X-RAY EXAM OF WRIST: CPT

## 2022-03-24 PROCEDURE — 6370000000 HC RX 637 (ALT 250 FOR IP): Performed by: PHYSICIAN ASSISTANT

## 2022-03-24 PROCEDURE — 25505 CLTX RDL SHFT FX W/MNPJ: CPT

## 2022-03-24 PROCEDURE — 99283 EMERGENCY DEPT VISIT LOW MDM: CPT

## 2022-03-24 RX ORDER — HYDROCODONE BITARTRATE AND ACETAMINOPHEN 5; 325 MG/1; MG/1
1 TABLET ORAL ONCE
Status: COMPLETED | OUTPATIENT
Start: 2022-03-24 | End: 2022-03-24

## 2022-03-24 RX ORDER — HYDROCODONE BITARTRATE AND ACETAMINOPHEN 5; 325 MG/1; MG/1
1 TABLET ORAL EVERY 6 HOURS PRN
Qty: 12 TABLET | Refills: 0 | Status: SHIPPED | OUTPATIENT
Start: 2022-03-24 | End: 2022-03-27

## 2022-03-24 RX ADMIN — HYDROCODONE BITARTRATE AND ACETAMINOPHEN 1 TABLET: 5; 325 TABLET ORAL at 15:50

## 2022-03-24 ASSESSMENT — PAIN SCALES - GENERAL: PAINLEVEL_OUTOF10: 8

## 2022-03-24 NOTE — ED PROVIDER NOTES
age.  Neck:  Normal ROM. Supple. Non-tender. Left Wrist: diffusely across carpal bones. Tenderness:  mild. Swelling: Moderate. Deformity: visual deformity present. ROM: diminished range with pain. Skin:  Ecchymosis edema present w/o wounds. Neurovascular: Motor deficit: none. Sensory deficit:   none. Pulse deficit: none. Capillary refill: normal.  Left Elbow: diffusely across elbow            Tenderness: none              Swelling: None. Deformity: no deformity observed/palpated. ROM: full range of motion. Skin:  no wounds, erythema, or swelling. Left Hand: all metacarpals             Tenderness: none              Swelling: Moderate. Deformity: no deformity observed/palpated. ROM: full range of motion. Skin:  ecchymosis. Lymphatics: No lymphangitis or adenopathy noted. Neurological:  Oriented. Motor functions intact. Lab / Imaging Results   (All laboratory and radiology results have been personally reviewed by myself)  Labs:  No results found for this visit on 03/24/22. Imaging: All Radiology results interpreted by Radiologist unless otherwise noted. XR WRIST LEFT (MIN 3 VIEWS)   Final Result   Status post placement of casting/splint material limiting underlying osseous   detail. Stable position of impacted distal radius fracture. XR WRIST LEFT (MIN 3 VIEWS)   Final Result   Impacted distal radial fracture. ED Course / Medical Decision Making   Medications - No data to display     Consult:   none    Procedure(s):  PROCEDURE NOTE    3/24/22       Time: 1169    JOINT  REDUCTION  PROCEDURE  Risks, benefits and alternatives (for applicable procedures below) described. Performed By: JENNIFER Rodas and EM Attending Physician. Indication: fracture  Informed consent: Verbal consent obtained.   The patient was counseled regarding the procedure in person, it's indications, risks, potential complications and alternatives and any questions were answered. Verbal consent was obtained. Location:   left  wrist  Procedure:  Anesthsetic/anesthsia was obtained using a hematoma block of the affected area using 5.0 cc of 1% Lidocaine without epinephrine. Attempted reduction was performed by traction and counter traction and was successful. Patient tolerated the procedure well. Post-reduction XR's:  were obtained and revealed satisfactory reduction. Orthopaedic Consultation:  No.      and   PROCEDURE NOTE  3/24/22       Time: 5874    SPLINT  APPLICATION  Risks, benefits and alternatives (for applicable procedures below) described. Performed By: JENNIFER Salomon. Indication:  fracture of Lt wrist .  Procedure:   A short  left arm  Anterior and Posterior splint was applied by me. The patient tolerated the procedure well. MDM:      Imaging was obtained based on high suspicion for fracture / bony abnormality, dislocation as per history/physical findings. Plan is subsequently for symptom control, ice, elevation, use of brace or splint and use of sling with outpatient follow-up with primary orthopaedist as instructed in d/c instructions. Plan of Care/Counseling:  Olivia Salomon reviewed today's visit with the patient and spouse / life partner in addition to providing specific details for the plan of care and counseling regarding the diagnosis and prognosis. Questions are answered at this time and are agreeable with the plan. Assessment      1. Closed Fitzgerald's fracture of left radius, initial encounter      Plan   Disposition:   Discharged home. Patient condition is good    New Medications     New Prescriptions    HYDROCODONE-ACETAMINOPHEN (NORCO) 5-325 MG PER TABLET    Take 1 tablet by mouth every 6 hours as needed for Pain for up to 3 days.      Electronically signed by JENNIFER Salomon   DD: 3/24/22  **This report was transcribed using voice recognition software. Every effort was made to ensure accuracy; however, inadvertent computerized transcription errors may be present.   [place] OF ED PROVIDER NOTE       Casper Found, [address]  03/27/22 [id number]

## 2022-04-06 DIAGNOSIS — K70.9 ALCOHOLIC LIVER DISEASE (HCC): ICD-10-CM

## 2022-04-06 DIAGNOSIS — K25.3: ICD-10-CM

## 2022-04-06 RX ORDER — OMEPRAZOLE 40 MG/1
40 CAPSULE, DELAYED RELEASE ORAL DAILY
Qty: 30 CAPSULE | Refills: 5 | Status: SHIPPED
Start: 2022-04-06 | End: 2022-08-31

## 2022-04-06 RX ORDER — MAGNESIUM OXIDE 400 MG/1
400 TABLET ORAL DAILY
Qty: 30 TABLET | Refills: 5 | Status: SHIPPED
Start: 2022-04-06 | End: 2022-08-31

## 2022-04-06 RX ORDER — FUROSEMIDE 20 MG/1
20 TABLET ORAL DAILY
Qty: 30 TABLET | Refills: 5 | Status: SHIPPED
Start: 2022-04-06 | End: 2022-05-31 | Stop reason: SDUPTHER

## 2022-04-06 RX ORDER — SPIRONOLACTONE 50 MG/1
50 TABLET, FILM COATED ORAL DAILY
Qty: 30 TABLET | Refills: 5 | Status: SHIPPED
Start: 2022-04-06 | End: 2022-05-31 | Stop reason: SDUPTHER

## 2022-04-06 RX ORDER — FERROUS SULFATE 325(65) MG
325 TABLET ORAL
Qty: 30 TABLET | Refills: 5 | Status: SHIPPED
Start: 2022-04-06 | End: 2022-08-31

## 2022-04-06 RX ORDER — SERTRALINE HYDROCHLORIDE 100 MG/1
150 TABLET, FILM COATED ORAL DAILY
Qty: 45 TABLET | Refills: 5 | Status: SHIPPED | OUTPATIENT
Start: 2022-04-06

## 2022-04-11 ENCOUNTER — TELEPHONE (OUTPATIENT)
Dept: FAMILY MEDICINE CLINIC | Age: 57
End: 2022-04-11

## 2022-04-11 DIAGNOSIS — K70.9 ALCOHOLIC LIVER DISEASE (HCC): Primary | ICD-10-CM

## 2022-04-11 DIAGNOSIS — K70.31 ASCITES DUE TO ALCOHOLIC CIRRHOSIS (HCC): Primary | ICD-10-CM

## 2022-04-11 DIAGNOSIS — K70.9 ALCOHOLIC LIVER DISEASE (HCC): ICD-10-CM

## 2022-04-11 LAB
ALBUMIN SERPL-MCNC: 2.6 G/DL (ref 3.5–5.2)
ALP BLD-CCNC: 95 U/L (ref 35–104)
ALT SERPL-CCNC: 43 U/L (ref 0–32)
ANION GAP SERPL CALCULATED.3IONS-SCNC: 10 MMOL/L (ref 7–16)
ANISOCYTOSIS: ABNORMAL
APTT: 32.1 SEC (ref 24.5–35.1)
AST SERPL-CCNC: 150 U/L (ref 0–31)
BASOPHILS ABSOLUTE: 0.02 E9/L (ref 0–0.2)
BASOPHILS RELATIVE PERCENT: 0.6 % (ref 0–2)
BILIRUB SERPL-MCNC: 0.9 MG/DL (ref 0–1.2)
BUN BLDV-MCNC: 10 MG/DL (ref 6–20)
CALCIUM SERPL-MCNC: 8.1 MG/DL (ref 8.6–10.2)
CHLORIDE BLD-SCNC: 110 MMOL/L (ref 98–107)
CO2: 19 MMOL/L (ref 22–29)
CREAT SERPL-MCNC: 0.8 MG/DL (ref 0.5–1)
EOSINOPHILS ABSOLUTE: 0.06 E9/L (ref 0.05–0.5)
EOSINOPHILS RELATIVE PERCENT: 1.9 % (ref 0–6)
FOLATE: 9.6 NG/ML (ref 4.8–24.2)
GFR AFRICAN AMERICAN: >60
GFR NON-AFRICAN AMERICAN: >60 ML/MIN/1.73
GLUCOSE BLD-MCNC: 84 MG/DL (ref 74–99)
HCT VFR BLD CALC: 25.3 % (ref 34–48)
HEMOGLOBIN: 7.6 G/DL (ref 11.5–15.5)
HYPOCHROMIA: ABNORMAL
IMMATURE GRANULOCYTES #: 0.01 E9/L
IMMATURE GRANULOCYTES %: 0.3 % (ref 0–5)
INR BLD: 1.1
LYMPHOCYTES ABSOLUTE: 0.67 E9/L (ref 1.5–4)
LYMPHOCYTES RELATIVE PERCENT: 21.5 % (ref 20–42)
MCH RBC QN AUTO: 37.8 PG (ref 26–35)
MCHC RBC AUTO-ENTMCNC: 30 % (ref 32–34.5)
MCV RBC AUTO: 125.9 FL (ref 80–99.9)
MONOCYTES ABSOLUTE: 0.33 E9/L (ref 0.1–0.95)
MONOCYTES RELATIVE PERCENT: 10.6 % (ref 2–12)
NEUTROPHILS ABSOLUTE: 2.03 E9/L (ref 1.8–7.3)
NEUTROPHILS RELATIVE PERCENT: 65.1 % (ref 43–80)
OVALOCYTES: ABNORMAL
PDW BLD-RTO: 15.5 FL (ref 11.5–15)
PLATELET # BLD: 175 E9/L (ref 130–450)
PMV BLD AUTO: 9.6 FL (ref 7–12)
POIKILOCYTES: ABNORMAL
POLYCHROMASIA: ABNORMAL
POTASSIUM SERPL-SCNC: 4.1 MMOL/L (ref 3.5–5)
PROTHROMBIN TIME: 12.1 SEC (ref 9.3–12.4)
RBC # BLD: 2.01 E12/L (ref 3.5–5.5)
SODIUM BLD-SCNC: 139 MMOL/L (ref 132–146)
TEAR DROP CELLS: ABNORMAL
TOTAL PROTEIN: 6.5 G/DL (ref 6.4–8.3)
VITAMIN B-12: 969 PG/ML (ref 211–946)
WBC # BLD: 3.1 E9/L (ref 4.5–11.5)

## 2022-04-11 NOTE — TELEPHONE ENCOUNTER
2022    Current Outpatient Medications   Medication Sig Dispense Refill    sertraline (ZOLOFT) 100 MG tablet Take 1.5 tablets by mouth daily 45 tablet 5    spironolactone (ALDACTONE) 50 MG tablet Take 1 tablet by mouth daily 30 tablet 5    ferrous sulfate (FEROSUL) 325 (65 Fe) MG tablet Take 1 tablet by mouth daily (with breakfast) 30 tablet 5    magnesium oxide (MAG-OX) 400 MG tablet Take 1 tablet by mouth daily 30 tablet 5    omeprazole (PRILOSEC) 40 MG delayed release capsule Take 1 capsule by mouth daily 30 capsule 5    furosemide (LASIX) 20 MG tablet Take 1 tablet by mouth daily 30 tablet 5     No current facility-administered medications for this visit. Connie Nguyen (: 1965 IS A 65 y.o. female ,Established patient, here for eval of No chief complaint on file. ASSESSMENT / PLAN      There are no diagnoses linked to this encounter. SUBJECTIVE    bp 90/54    [unfilled]      OBJECTIVE  Weight 109  Wt Readings from Last 3 Encounters:   22 87 lb (39.5 kg)   21 87 lb (39.5 kg)   20 112 lb 3.2 oz (50.9 kg)       [unfilled]    [unfilled]      No follow-ups on file. An electronic signature was used to authenticate this note.     MELYSSA Pizarro    2022

## 2022-04-12 ENCOUNTER — TELEPHONE (OUTPATIENT)
Dept: FAMILY MEDICINE CLINIC | Age: 57
End: 2022-04-12

## 2022-04-12 DIAGNOSIS — K70.31 ASCITES DUE TO ALCOHOLIC CIRRHOSIS (HCC): Primary | ICD-10-CM

## 2022-04-12 LAB — AMMONIA: 32 UMOL/L (ref 11–51)

## 2022-04-12 NOTE — TELEPHONE ENCOUNTER
Don Collazo called from [facility], [facility]. Radiology re pt. They need an order for an [place] Limited ascites survey. Please give order to myself and I will fax it over to Don Collazo. Thank you. Fax# [phone]. # [phone].

## 2022-04-12 NOTE — TELEPHONE ENCOUNTER
Spoke to doctor and order was put thru. Faxed order over to [facility]. Spoke to Miheala Monk at [facility] and she will call pt to get everything set up. DUKE.

## 2022-04-15 ENCOUNTER — HOSPITAL ENCOUNTER (OUTPATIENT)
Dept: ULTRASOUND IMAGING | Age: 57
Discharge: HOME | End: 2022-04-17
Payer: COMMERCIAL

## 2022-04-15 DIAGNOSIS — K70.31 ASCITES DUE TO ALCOHOLIC CIRRHOSIS (HCC): ICD-10-CM

## 2022-04-15 PROCEDURE — 76705 ECHO EXAM OF ABDOMEN: CPT

## 2022-04-26 ENCOUNTER — HOSPITAL ENCOUNTER (OUTPATIENT)
Dept: ULTRASOUND IMAGING | Age: 57
Discharge: HOME | End: 2022-04-28
Payer: COMMERCIAL

## 2022-04-26 VITALS
SYSTOLIC BLOOD PRESSURE: 137 MMHG | RESPIRATION RATE: 18 BRPM | HEART RATE: 84 BPM | DIASTOLIC BLOOD PRESSURE: 82 MMHG | OXYGEN SATURATION: 99 %

## 2022-04-26 DIAGNOSIS — K70.31 ASCITES DUE TO ALCOHOLIC CIRRHOSIS (HCC): ICD-10-CM

## 2022-04-26 PROCEDURE — C1729 CATH, DRAINAGE: HCPCS

## 2022-04-26 RX ORDER — ALBUMIN (HUMAN) 12.5 G/50ML
SOLUTION INTRAVENOUS CONTINUOUS PRN
Status: COMPLETED | OUTPATIENT
Start: 2022-04-26 | End: 2022-04-26

## 2022-04-26 ASSESSMENT — PAIN - FUNCTIONAL ASSESSMENT: PAIN_FUNCTIONAL_ASSESSMENT: NONE - DENIES PAIN

## 2022-04-26 NOTE — OR NURSING
Patient brought into room, discussed procedure. Dr. Michelle Bautista answered all questions and concerns related to the procedure. Treatment consent signed. Patient positioned and sterile prepped for the procedure. 1% Lidocaine administered by Dr. Brooklyn Sargent. A total of 2600 mL clear yellow ascitic fluid was taken. Patient tolerated procedure well. Site cleaned and dressed with a bandage. Vitals monitored and remained stable throughout. Discharge papers reviewed and signed. Patient escorted out of department with all belongings and without distress.

## 2022-05-31 DIAGNOSIS — K70.9 ALCOHOLIC LIVER DISEASE (HCC): ICD-10-CM

## 2022-05-31 RX ORDER — SPIRONOLACTONE 50 MG/1
50 TABLET, FILM COATED ORAL 2 TIMES DAILY
Qty: 60 TABLET | Refills: 5 | Status: SHIPPED | OUTPATIENT
Start: 2022-05-31

## 2022-05-31 RX ORDER — FUROSEMIDE 20 MG/1
40 TABLET ORAL DAILY
Qty: 60 TABLET | Refills: 5 | Status: SHIPPED | OUTPATIENT
Start: 2022-05-31

## 2022-08-30 DIAGNOSIS — K25.3: ICD-10-CM

## 2022-08-31 RX ORDER — OMEPRAZOLE 40 MG/1
CAPSULE, DELAYED RELEASE ORAL
Qty: 30 CAPSULE | Refills: 0 | Status: SHIPPED | OUTPATIENT
Start: 2022-08-31

## 2022-08-31 RX ORDER — FERROUS SULFATE 325(65) MG
TABLET ORAL
Qty: 30 TABLET | Refills: 0 | Status: SHIPPED | OUTPATIENT
Start: 2022-08-31

## 2022-08-31 RX ORDER — LANOLIN ALCOHOL/MO/W.PET/CERES
CREAM (GRAM) TOPICAL
Qty: 30 TABLET | Refills: 0 | Status: SHIPPED | OUTPATIENT
Start: 2022-08-31

## 2022-10-31 DIAGNOSIS — E03.9 ACQUIRED HYPOTHYROIDISM: ICD-10-CM

## 2022-10-31 DIAGNOSIS — D69.6 THROMBOCYTOPENIA (HCC): ICD-10-CM

## 2022-10-31 DIAGNOSIS — K70.9 ALCOHOLIC LIVER DISEASE (HCC): Primary | ICD-10-CM

## 2022-10-31 DIAGNOSIS — D50.8 IRON DEFICIENCY ANEMIA SECONDARY TO INADEQUATE DIETARY IRON INTAKE: ICD-10-CM

## 2022-10-31 DIAGNOSIS — E43 SEVERE PROTEIN-CALORIE MALNUTRITION (HCC): ICD-10-CM

## 2022-10-31 DIAGNOSIS — R79.89 ELEVATED LFTS: ICD-10-CM

## 2022-11-04 DIAGNOSIS — R79.89 ELEVATED LFTS: ICD-10-CM

## 2022-11-04 DIAGNOSIS — D69.6 THROMBOCYTOPENIA (HCC): ICD-10-CM

## 2022-11-04 DIAGNOSIS — K70.9 ALCOHOLIC LIVER DISEASE (HCC): ICD-10-CM

## 2022-11-04 DIAGNOSIS — E43 SEVERE PROTEIN-CALORIE MALNUTRITION (HCC): ICD-10-CM

## 2022-11-04 DIAGNOSIS — D50.8 IRON DEFICIENCY ANEMIA SECONDARY TO INADEQUATE DIETARY IRON INTAKE: ICD-10-CM

## 2022-11-04 LAB
ALBUMIN SERPL-MCNC: 4.1 G/DL (ref 3.5–5.2)
ALP BLD-CCNC: 91 U/L (ref 35–104)
ALT SERPL-CCNC: 29 U/L (ref 0–32)
ANION GAP SERPL CALCULATED.3IONS-SCNC: 19 MMOL/L (ref 7–16)
APTT: 29.4 SEC (ref 24.5–35.1)
AST SERPL-CCNC: 118 U/L (ref 0–31)
BASOPHILS ABSOLUTE: 0.03 E9/L (ref 0–0.2)
BASOPHILS RELATIVE PERCENT: 1.2 % (ref 0–2)
BILIRUB SERPL-MCNC: 1.1 MG/DL (ref 0–1.2)
BUN BLDV-MCNC: 19 MG/DL (ref 6–20)
CALCIUM SERPL-MCNC: 9.4 MG/DL (ref 8.6–10.2)
CHLORIDE BLD-SCNC: 98 MMOL/L (ref 98–107)
CO2: 21 MMOL/L (ref 22–29)
CREAT SERPL-MCNC: 0.8 MG/DL (ref 0.5–1)
EOSINOPHILS ABSOLUTE: 0.19 E9/L (ref 0.05–0.5)
EOSINOPHILS RELATIVE PERCENT: 7.5 % (ref 0–6)
FOLATE: 8.4 NG/ML (ref 4.8–24.2)
GFR SERPL CREATININE-BSD FRML MDRD: >60 ML/MIN/1.73
GLUCOSE BLD-MCNC: 114 MG/DL (ref 74–99)
HCT VFR BLD CALC: 40.3 % (ref 34–48)
HEMOGLOBIN: 13.6 G/DL (ref 11.5–15.5)
IMMATURE GRANULOCYTES #: 0.01 E9/L
IMMATURE GRANULOCYTES %: 0.4 % (ref 0–5)
INR BLD: 1
IRON SATURATION: 30 % (ref 15–50)
IRON: 84 MCG/DL (ref 37–145)
LYMPHOCYTES ABSOLUTE: 0.79 E9/L (ref 1.5–4)
LYMPHOCYTES RELATIVE PERCENT: 31 % (ref 20–42)
MCH RBC QN AUTO: 38.5 PG (ref 26–35)
MCHC RBC AUTO-ENTMCNC: 33.7 % (ref 32–34.5)
MCV RBC AUTO: 114.2 FL (ref 80–99.9)
MONOCYTES ABSOLUTE: 0.3 E9/L (ref 0.1–0.95)
MONOCYTES RELATIVE PERCENT: 11.8 % (ref 2–12)
NEUTROPHILS ABSOLUTE: 1.23 E9/L (ref 1.8–7.3)
NEUTROPHILS RELATIVE PERCENT: 48.1 % (ref 43–80)
OVALOCYTES: ABNORMAL
PDW BLD-RTO: 14.7 FL (ref 11.5–15)
PLATELET # BLD: 134 E9/L (ref 130–450)
PMV BLD AUTO: 9.4 FL (ref 7–12)
POIKILOCYTES: ABNORMAL
POLYCHROMASIA: ABNORMAL
POTASSIUM SERPL-SCNC: 3.8 MMOL/L (ref 3.5–5)
PROTHROMBIN TIME: 11.1 SEC (ref 9.3–12.4)
RBC # BLD: 3.53 E12/L (ref 3.5–5.5)
SCHISTOCYTES: ABNORMAL
SODIUM BLD-SCNC: 138 MMOL/L (ref 132–146)
TEAR DROP CELLS: ABNORMAL
TOTAL IRON BINDING CAPACITY: 280 MCG/DL (ref 250–450)
TOTAL PROTEIN: 8.8 G/DL (ref 6.4–8.3)
VITAMIN B-12: 580 PG/ML (ref 211–946)
WBC # BLD: 2.6 E9/L (ref 4.5–11.5)

## 2022-11-07 LAB — AMMONIA: 79 UMOL/L (ref 11–51)

## 2022-11-07 RX ORDER — LANOLIN ALCOHOL/MO/W.PET/CERES
CREAM (GRAM) TOPICAL
Qty: 30 TABLET | Refills: 0 | Status: SHIPPED | OUTPATIENT
Start: 2022-11-07

## 2022-11-07 RX ORDER — FERROUS SULFATE 325(65) MG
TABLET ORAL
Qty: 30 TABLET | Refills: 0 | Status: SHIPPED | OUTPATIENT
Start: 2022-11-07

## 2022-11-07 NOTE — TELEPHONE ENCOUNTER
Medication Refill Request    LOV 4/26/2021  NOV Visit date not found    Lab Results   Component Value Date    CREATININE 0.8 11/04/2022

## 2022-12-05 DIAGNOSIS — K25.3: ICD-10-CM

## 2022-12-05 DIAGNOSIS — K70.9 ALCOHOLIC LIVER DISEASE (HCC): ICD-10-CM

## 2022-12-06 RX ORDER — SPIRONOLACTONE 50 MG/1
TABLET, FILM COATED ORAL
Qty: 60 TABLET | Refills: 5 | Status: SHIPPED | OUTPATIENT
Start: 2022-12-06

## 2022-12-06 RX ORDER — OMEPRAZOLE 40 MG/1
CAPSULE, DELAYED RELEASE ORAL
Qty: 30 CAPSULE | Refills: 5 | Status: SHIPPED | OUTPATIENT
Start: 2022-12-06

## 2022-12-06 RX ORDER — SERTRALINE HYDROCHLORIDE 100 MG/1
TABLET, FILM COATED ORAL
Qty: 45 TABLET | Refills: 5 | Status: SHIPPED | OUTPATIENT
Start: 2022-12-06

## 2022-12-06 RX ORDER — FUROSEMIDE 20 MG/1
TABLET ORAL
Qty: 60 TABLET | Refills: 5 | Status: SHIPPED | OUTPATIENT
Start: 2022-12-06

## 2023-01-06 RX ORDER — FERROUS SULFATE 325(65) MG
TABLET ORAL
Qty: 30 TABLET | Refills: 0 | Status: SHIPPED | OUTPATIENT
Start: 2023-01-06

## 2023-02-06 RX ORDER — LANOLIN ALCOHOL/MO/W.PET/CERES
CREAM (GRAM) TOPICAL
Qty: 30 TABLET | Refills: 0 | Status: SHIPPED | OUTPATIENT
Start: 2023-02-06

## 2023-03-05 DIAGNOSIS — E03.9 ACQUIRED HYPOTHYROIDISM: ICD-10-CM

## 2023-03-05 DIAGNOSIS — K70.9 ALCOHOLIC LIVER DISEASE (HCC): Primary | ICD-10-CM

## 2023-03-06 RX ORDER — FERROUS SULFATE 325(65) MG
TABLET ORAL
Qty: 30 TABLET | Refills: 0 | Status: SHIPPED | OUTPATIENT
Start: 2023-03-06

## 2023-03-06 RX ORDER — LANOLIN ALCOHOL/MO/W.PET/CERES
CREAM (GRAM) TOPICAL
Qty: 30 TABLET | Refills: 0 | Status: SHIPPED | OUTPATIENT
Start: 2023-03-06

## 2023-05-08 RX ORDER — LANOLIN ALCOHOL/MO/W.PET/CERES
CREAM (GRAM) TOPICAL
Qty: 30 TABLET | Refills: 1 | Status: SHIPPED | OUTPATIENT
Start: 2023-05-08

## 2023-06-05 DIAGNOSIS — K70.9 ALCOHOLIC LIVER DISEASE (HCC): Primary | ICD-10-CM

## 2023-06-05 DIAGNOSIS — K25.3: ICD-10-CM

## 2023-06-05 DIAGNOSIS — E03.9 ACQUIRED HYPOTHYROIDISM: ICD-10-CM

## 2023-06-05 RX ORDER — FERROUS SULFATE 325(65) MG
TABLET ORAL
Qty: 30 TABLET | Refills: 0 | Status: SHIPPED | OUTPATIENT
Start: 2023-06-05

## (undated) DEVICE — WORKING LENGTH 155CM, WORKING CHANNEL 2.8MM: Brand: RESOLUTION 360 CLIP

## (undated) DEVICE — Device

## (undated) DEVICE — GRADUATE TRIANG MEASURE 1000ML BLK PRNT

## (undated) DEVICE — SPONGE GZ W4XL4IN RAYON POLY FILL CVR W/ NONWOVEN FAB

## (undated) DEVICE — CONMED SCOPE SAVER BITE BLOCK, 14 X 20 MM: Brand: CONMED SCOPE SAVER

## (undated) DEVICE — BLOCK BITE 60FR RUBBER ADLT DENTAL